# Patient Record
Sex: MALE | Race: WHITE | NOT HISPANIC OR LATINO | Employment: FULL TIME | URBAN - METROPOLITAN AREA
[De-identification: names, ages, dates, MRNs, and addresses within clinical notes are randomized per-mention and may not be internally consistent; named-entity substitution may affect disease eponyms.]

---

## 2018-01-09 ENCOUNTER — APPOINTMENT (OUTPATIENT)
Dept: RADIOLOGY | Facility: MEDICAL CENTER | Age: 33
End: 2018-01-09
Attending: EMERGENCY MEDICINE

## 2018-01-09 ENCOUNTER — APPOINTMENT (OUTPATIENT)
Dept: RADIOLOGY | Facility: MEDICAL CENTER | Age: 33
End: 2018-01-09
Attending: STUDENT IN AN ORGANIZED HEALTH CARE EDUCATION/TRAINING PROGRAM

## 2018-01-09 ENCOUNTER — HOSPITAL ENCOUNTER (OUTPATIENT)
Facility: MEDICAL CENTER | Age: 33
End: 2018-01-10
Attending: EMERGENCY MEDICINE | Admitting: INTERNAL MEDICINE

## 2018-01-09 ENCOUNTER — RESOLUTE PROFESSIONAL BILLING HOSPITAL PROF FEE (OUTPATIENT)
Dept: HOSPITALIST | Facility: MEDICAL CENTER | Age: 33
End: 2018-01-09

## 2018-01-09 DIAGNOSIS — S01.512A LACERATION OF TONGUE, INITIAL ENCOUNTER: ICD-10-CM

## 2018-01-09 DIAGNOSIS — S00.83XA CONTUSION OF FACE, INITIAL ENCOUNTER: ICD-10-CM

## 2018-01-09 DIAGNOSIS — F12.10 MARIJUANA ABUSE: ICD-10-CM

## 2018-01-09 DIAGNOSIS — R56.9 SEIZURE (HCC): ICD-10-CM

## 2018-01-09 PROBLEM — R74.01 TRANSAMINITIS: Status: ACTIVE | Noted: 2018-01-09

## 2018-01-09 PROBLEM — F12.90 MARIJUANA USE: Status: ACTIVE | Noted: 2018-01-09

## 2018-01-09 PROBLEM — E87.1 HYPONATREMIA: Status: ACTIVE | Noted: 2018-01-09

## 2018-01-09 PROBLEM — R79.89 LACTATE BLOOD INCREASE: Status: ACTIVE | Noted: 2018-01-09

## 2018-01-09 PROBLEM — R55 SYNCOPE: Status: ACTIVE | Noted: 2018-01-09

## 2018-01-09 LAB
ALBUMIN SERPL BCP-MCNC: 4 G/DL (ref 3.2–4.9)
ALBUMIN/GLOB SERPL: 1.6 G/DL
ALP SERPL-CCNC: 82 U/L (ref 30–99)
ALT SERPL-CCNC: 152 U/L (ref 2–50)
AMPHET UR QL SCN: NEGATIVE
ANION GAP SERPL CALC-SCNC: 13 MMOL/L (ref 0–11.9)
APAP SERPL-MCNC: <10 UG/ML (ref 10–30)
AST SERPL-CCNC: 189 U/L (ref 12–45)
BARBITURATES UR QL SCN: NEGATIVE
BASOPHILS # BLD AUTO: 0.2 % (ref 0–1.8)
BASOPHILS # BLD: 0.02 K/UL (ref 0–0.12)
BENZODIAZ UR QL SCN: NEGATIVE
BILIRUB CONJ SERPL-MCNC: 0.4 MG/DL (ref 0.1–0.5)
BILIRUB SERPL-MCNC: 1.7 MG/DL (ref 0.1–1.5)
BUN SERPL-MCNC: 16 MG/DL (ref 8–22)
BZE UR QL SCN: NEGATIVE
CALCIUM SERPL-MCNC: 8.4 MG/DL (ref 8.5–10.5)
CANNABINOIDS UR QL SCN: POSITIVE
CHLORIDE SERPL-SCNC: 97 MMOL/L (ref 96–112)
CHLORIDE UR-SCNC: 21 MMOL/L
CHOLEST SERPL-MCNC: 131 MG/DL (ref 100–199)
CO2 SERPL-SCNC: 21 MMOL/L (ref 20–33)
CREAT SERPL-MCNC: 0.69 MG/DL (ref 0.5–1.4)
CREAT UR-MCNC: 369.9 MG/DL
DEPRECATED D DIMER PPP IA-ACNC: 8053 NG/ML(D-DU)
EKG IMPRESSION: NORMAL
EOSINOPHIL # BLD AUTO: 0 K/UL (ref 0–0.51)
EOSINOPHIL NFR BLD: 0 % (ref 0–6.9)
ERYTHROCYTE [DISTWIDTH] IN BLOOD BY AUTOMATED COUNT: 44.1 FL (ref 35.9–50)
ERYTHROCYTE [SEDIMENTATION RATE] IN BLOOD BY WESTERGREN METHOD: >120 MM/HOUR (ref 0–15)
ETHANOL BLD-MCNC: 0 G/DL
GLOBULIN SER CALC-MCNC: 2.5 G/DL (ref 1.9–3.5)
GLUCOSE SERPL-MCNC: 150 MG/DL (ref 65–99)
HAV IGM SERPL QL IA: NEGATIVE
HBV CORE IGM SER QL: NEGATIVE
HBV SURFACE AG SER QL: NEGATIVE
HCT VFR BLD AUTO: 40.8 % (ref 42–52)
HCV AB SER QL: NEGATIVE
HDLC SERPL-MCNC: 50 MG/DL
HGB BLD-MCNC: 14.9 G/DL (ref 14–18)
IMM GRANULOCYTES # BLD AUTO: 0.05 K/UL (ref 0–0.11)
IMM GRANULOCYTES NFR BLD AUTO: 0.6 % (ref 0–0.9)
LACTATE BLD-SCNC: 1.9 MMOL/L (ref 0.5–2)
LACTATE BLD-SCNC: 3.2 MMOL/L (ref 0.5–2)
LDLC SERPL CALC-MCNC: 70 MG/DL
LV EJECT FRACT  99904: 65
LV EJECT FRACT MOD 2C 99903: 62.87
LV EJECT FRACT MOD 4C 99902: 59.39
LV EJECT FRACT MOD BP 99901: 61.9
LYMPHOCYTES # BLD AUTO: 0.31 K/UL (ref 1–4.8)
LYMPHOCYTES NFR BLD: 3.8 % (ref 22–41)
MAGNESIUM SERPL-MCNC: 1.6 MG/DL (ref 1.5–2.5)
MCH RBC QN AUTO: 33 PG (ref 27–33)
MCHC RBC AUTO-ENTMCNC: 36.5 G/DL (ref 33.7–35.3)
MCV RBC AUTO: 90.3 FL (ref 81.4–97.8)
METHADONE UR QL SCN: NEGATIVE
MONOCYTES # BLD AUTO: 0.61 K/UL (ref 0–0.85)
MONOCYTES NFR BLD AUTO: 7.5 % (ref 0–13.4)
NEUTROPHILS # BLD AUTO: 7.15 K/UL (ref 1.82–7.42)
NEUTROPHILS NFR BLD: 87.9 % (ref 44–72)
NRBC # BLD AUTO: 0 K/UL
NRBC BLD-RTO: 0 /100 WBC
OPIATES UR QL SCN: NEGATIVE
OSMOLALITY SERPL: 276 MOSM/KG H2O (ref 278–298)
OSMOLALITY UR: 902 MOSM/KG H2O (ref 300–900)
OXYCODONE UR QL SCN: NEGATIVE
PCP UR QL SCN: NEGATIVE
PLATELET # BLD AUTO: 159 K/UL (ref 164–446)
PMV BLD AUTO: 10.4 FL (ref 9–12.9)
POTASSIUM SERPL-SCNC: 3.9 MMOL/L (ref 3.6–5.5)
POTASSIUM UR-SCNC: 93.8 MMOL/L
PROPOXYPH UR QL SCN: NEGATIVE
PROT SERPL-MCNC: 6.5 G/DL (ref 6–8.2)
RBC # BLD AUTO: 4.52 M/UL (ref 4.7–6.1)
SODIUM SERPL-SCNC: 131 MMOL/L (ref 135–145)
SODIUM UR-SCNC: 25 MMOL/L
TRIGL SERPL-MCNC: 55 MG/DL (ref 0–149)
TROPONIN I SERPL-MCNC: <0.01 NG/ML (ref 0–0.04)
WBC # BLD AUTO: 8.1 K/UL (ref 4.8–10.8)

## 2018-01-09 PROCEDURE — 83935 ASSAY OF URINE OSMOLALITY: CPT

## 2018-01-09 PROCEDURE — 700117 HCHG RX CONTRAST REV CODE 255: Performed by: STUDENT IN AN ORGANIZED HEALTH CARE EDUCATION/TRAINING PROGRAM

## 2018-01-09 PROCEDURE — 85025 COMPLETE CBC W/AUTO DIFF WBC: CPT

## 2018-01-09 PROCEDURE — 80074 ACUTE HEPATITIS PANEL: CPT

## 2018-01-09 PROCEDURE — 83605 ASSAY OF LACTIC ACID: CPT | Mod: 91

## 2018-01-09 PROCEDURE — 36415 COLL VENOUS BLD VENIPUNCTURE: CPT

## 2018-01-09 PROCEDURE — 96375 TX/PRO/DX INJ NEW DRUG ADDON: CPT

## 2018-01-09 PROCEDURE — 93306 TTE W/DOPPLER COMPLETE: CPT | Mod: 26 | Performed by: INTERNAL MEDICINE

## 2018-01-09 PROCEDURE — 84133 ASSAY OF URINE POTASSIUM: CPT

## 2018-01-09 PROCEDURE — 71275 CT ANGIOGRAPHY CHEST: CPT

## 2018-01-09 PROCEDURE — A9270 NON-COVERED ITEM OR SERVICE: HCPCS | Performed by: STUDENT IN AN ORGANIZED HEALTH CARE EDUCATION/TRAINING PROGRAM

## 2018-01-09 PROCEDURE — 700111 HCHG RX REV CODE 636 W/ 250 OVERRIDE (IP): Performed by: STUDENT IN AN ORGANIZED HEALTH CARE EDUCATION/TRAINING PROGRAM

## 2018-01-09 PROCEDURE — G0378 HOSPITAL OBSERVATION PER HR: HCPCS

## 2018-01-09 PROCEDURE — 96374 THER/PROPH/DIAG INJ IV PUSH: CPT

## 2018-01-09 PROCEDURE — 83735 ASSAY OF MAGNESIUM: CPT

## 2018-01-09 PROCEDURE — 93306 TTE W/DOPPLER COMPLETE: CPT

## 2018-01-09 PROCEDURE — 70551 MRI BRAIN STEM W/O DYE: CPT

## 2018-01-09 PROCEDURE — 80307 DRUG TEST PRSMV CHEM ANLYZR: CPT

## 2018-01-09 PROCEDURE — 76705 ECHO EXAM OF ABDOMEN: CPT

## 2018-01-09 PROCEDURE — 700105 HCHG RX REV CODE 258: Performed by: STUDENT IN AN ORGANIZED HEALTH CARE EDUCATION/TRAINING PROGRAM

## 2018-01-09 PROCEDURE — 93005 ELECTROCARDIOGRAM TRACING: CPT | Performed by: EMERGENCY MEDICINE

## 2018-01-09 PROCEDURE — 70450 CT HEAD/BRAIN W/O DYE: CPT

## 2018-01-09 PROCEDURE — 99220 PR INITIAL OBSERVATION CARE,LEVL III: CPT | Performed by: INTERNAL MEDICINE

## 2018-01-09 PROCEDURE — 700105 HCHG RX REV CODE 258: Performed by: INTERNAL MEDICINE

## 2018-01-09 PROCEDURE — 80053 COMPREHEN METABOLIC PANEL: CPT

## 2018-01-09 PROCEDURE — 95819 EEG AWAKE AND ASLEEP: CPT

## 2018-01-09 PROCEDURE — 85652 RBC SED RATE AUTOMATED: CPT

## 2018-01-09 PROCEDURE — 84300 ASSAY OF URINE SODIUM: CPT

## 2018-01-09 PROCEDURE — 82570 ASSAY OF URINE CREATININE: CPT

## 2018-01-09 PROCEDURE — 82248 BILIRUBIN DIRECT: CPT

## 2018-01-09 PROCEDURE — 700111 HCHG RX REV CODE 636 W/ 250 OVERRIDE (IP): Performed by: EMERGENCY MEDICINE

## 2018-01-09 PROCEDURE — 99285 EMERGENCY DEPT VISIT HI MDM: CPT

## 2018-01-09 PROCEDURE — 85379 FIBRIN DEGRADATION QUANT: CPT

## 2018-01-09 PROCEDURE — 82436 ASSAY OF URINE CHLORIDE: CPT

## 2018-01-09 PROCEDURE — 80061 LIPID PANEL: CPT

## 2018-01-09 PROCEDURE — 700105 HCHG RX REV CODE 258: Performed by: EMERGENCY MEDICINE

## 2018-01-09 PROCEDURE — 700102 HCHG RX REV CODE 250 W/ 637 OVERRIDE(OP): Performed by: STUDENT IN AN ORGANIZED HEALTH CARE EDUCATION/TRAINING PROGRAM

## 2018-01-09 PROCEDURE — 87535 HIV-1 PROBE&REVERSE TRNSCRPJ: CPT

## 2018-01-09 PROCEDURE — 83930 ASSAY OF BLOOD OSMOLALITY: CPT

## 2018-01-09 PROCEDURE — 84484 ASSAY OF TROPONIN QUANT: CPT

## 2018-01-09 RX ORDER — SODIUM CHLORIDE 9 MG/ML
1000 INJECTION, SOLUTION INTRAVENOUS ONCE
Status: COMPLETED | OUTPATIENT
Start: 2018-01-09 | End: 2018-01-09

## 2018-01-09 RX ORDER — ONDANSETRON 4 MG/1
4 TABLET, ORALLY DISINTEGRATING ORAL EVERY 4 HOURS PRN
Status: DISCONTINUED | OUTPATIENT
Start: 2018-01-09 | End: 2018-01-10 | Stop reason: HOSPADM

## 2018-01-09 RX ORDER — LEVETIRACETAM 100 MG/ML
500 SOLUTION ORAL EVERY 12 HOURS
Status: DISCONTINUED | OUTPATIENT
Start: 2018-01-09 | End: 2018-01-09

## 2018-01-09 RX ORDER — AMOXICILLIN 250 MG
2 CAPSULE ORAL 2 TIMES DAILY
Status: DISCONTINUED | OUTPATIENT
Start: 2018-01-09 | End: 2018-01-10 | Stop reason: HOSPADM

## 2018-01-09 RX ORDER — ONDANSETRON 2 MG/ML
4 INJECTION INTRAMUSCULAR; INTRAVENOUS EVERY 4 HOURS PRN
Status: DISCONTINUED | OUTPATIENT
Start: 2018-01-09 | End: 2018-01-10 | Stop reason: HOSPADM

## 2018-01-09 RX ORDER — LEVETIRACETAM 500 MG/1
500 TABLET ORAL 2 TIMES DAILY
Status: DISCONTINUED | OUTPATIENT
Start: 2018-01-09 | End: 2018-01-10 | Stop reason: HOSPADM

## 2018-01-09 RX ORDER — BISACODYL 10 MG
10 SUPPOSITORY, RECTAL RECTAL
Status: DISCONTINUED | OUTPATIENT
Start: 2018-01-09 | End: 2018-01-10 | Stop reason: HOSPADM

## 2018-01-09 RX ORDER — THIAMINE MONONITRATE (VIT B1) 100 MG
100 TABLET ORAL DAILY
Status: DISCONTINUED | OUTPATIENT
Start: 2018-01-09 | End: 2018-01-10 | Stop reason: HOSPADM

## 2018-01-09 RX ORDER — MAGNESIUM SULFATE HEPTAHYDRATE 40 MG/ML
2 INJECTION, SOLUTION INTRAVENOUS ONCE
Status: COMPLETED | OUTPATIENT
Start: 2018-01-09 | End: 2018-01-09

## 2018-01-09 RX ORDER — POLYETHYLENE GLYCOL 3350 17 G/17G
1 POWDER, FOR SOLUTION ORAL
Status: DISCONTINUED | OUTPATIENT
Start: 2018-01-09 | End: 2018-01-10 | Stop reason: HOSPADM

## 2018-01-09 RX ORDER — FOLIC ACID 1 MG/1
1 TABLET ORAL DAILY
Status: DISCONTINUED | OUTPATIENT
Start: 2018-01-09 | End: 2018-01-10 | Stop reason: HOSPADM

## 2018-01-09 RX ORDER — METOCLOPRAMIDE HYDROCHLORIDE 5 MG/ML
10 INJECTION INTRAMUSCULAR; INTRAVENOUS ONCE
Status: COMPLETED | OUTPATIENT
Start: 2018-01-09 | End: 2018-01-09

## 2018-01-09 RX ORDER — SODIUM CHLORIDE 9 MG/ML
INJECTION, SOLUTION INTRAVENOUS CONTINUOUS
Status: DISCONTINUED | OUTPATIENT
Start: 2018-01-09 | End: 2018-01-10 | Stop reason: HOSPADM

## 2018-01-09 RX ADMIN — METOCLOPRAMIDE 10 MG: 5 INJECTION, SOLUTION INTRAMUSCULAR; INTRAVENOUS at 05:13

## 2018-01-09 RX ADMIN — ONDANSETRON 4 MG: 4 TABLET, ORALLY DISINTEGRATING ORAL at 23:45

## 2018-01-09 RX ADMIN — MAGNESIUM SULFATE IN WATER 2 G: 40 INJECTION, SOLUTION INTRAVENOUS at 11:57

## 2018-01-09 RX ADMIN — ONDANSETRON 4 MG: 4 TABLET, ORALLY DISINTEGRATING ORAL at 17:22

## 2018-01-09 RX ADMIN — Medication 100 MG: at 09:41

## 2018-01-09 RX ADMIN — SODIUM CHLORIDE: 9 INJECTION, SOLUTION INTRAVENOUS at 10:00

## 2018-01-09 RX ADMIN — IOHEXOL 100 ML: 350 INJECTION, SOLUTION INTRAVENOUS at 09:48

## 2018-01-09 RX ADMIN — LEVETIRACETAM 500 MG: 500 TABLET, FILM COATED ORAL at 19:33

## 2018-01-09 RX ADMIN — SODIUM CHLORIDE: 9 INJECTION, SOLUTION INTRAVENOUS at 23:45

## 2018-01-09 RX ADMIN — FOLIC ACID 1 MG: 1 TABLET ORAL at 09:41

## 2018-01-09 RX ADMIN — ENOXAPARIN SODIUM 40 MG: 100 INJECTION SUBCUTANEOUS at 09:42

## 2018-01-09 RX ADMIN — ONDANSETRON 4 MG: 2 INJECTION INTRAMUSCULAR; INTRAVENOUS at 07:56

## 2018-01-09 RX ADMIN — THERA TABS 1 TABLET: TAB at 09:40

## 2018-01-09 RX ADMIN — SODIUM CHLORIDE 1000 ML: 9 INJECTION, SOLUTION INTRAVENOUS at 05:16

## 2018-01-09 RX ADMIN — SODIUM CHLORIDE 1000 ML: 9 INJECTION, SOLUTION INTRAVENOUS at 04:15

## 2018-01-09 ASSESSMENT — ENCOUNTER SYMPTOMS
DIARRHEA: 0
ABDOMINAL PAIN: 1
WEAKNESS: 1
SENSORY CHANGE: 0
BLURRED VISION: 0
FOCAL WEAKNESS: 0
DIAPHORESIS: 1
NAUSEA: 0
SHORTNESS OF BREATH: 0
DOUBLE VISION: 0
SORE THROAT: 0
CONSTIPATION: 0
TREMORS: 1
VOMITING: 0
DEPRESSION: 0
PALPITATIONS: 0
NAUSEA: 1
VOMITING: 1
DIARRHEA: 1
CHILLS: 1
WEAKNESS: 0
ABDOMINAL PAIN: 0
PHOTOPHOBIA: 0
DIZZINESS: 0
FEVER: 0
CHILLS: 0
COUGH: 0
BLOOD IN STOOL: 0
MYALGIAS: 0
FEVER: 1
LOSS OF CONSCIOUSNESS: 1
HEADACHES: 0
TINGLING: 0
WHEEZING: 0
BACK PAIN: 0

## 2018-01-09 ASSESSMENT — LIFESTYLE VARIABLES
EVER FELT BAD OR GUILTY ABOUT YOUR DRINKING: NO
TOTAL SCORE: 0
HAVE YOU EVER FELT YOU SHOULD CUT DOWN ON YOUR DRINKING: NO
SUBSTANCE_ABUSE: 1
ALCOHOL_USE: YES
ON A TYPICAL DAY WHEN YOU DRINK ALCOHOL HOW MANY DRINKS DO YOU HAVE: 2
EVER_SMOKED: NEVER
TOTAL SCORE: 0
TOTAL SCORE: 0
HAVE PEOPLE ANNOYED YOU BY CRITICIZING YOUR DRINKING: NO
EVER HAD A DRINK FIRST THING IN THE MORNING TO STEADY YOUR NERVES TO GET RID OF A HANGOVER: NO
AVERAGE NUMBER OF DAYS PER WEEK YOU HAVE A DRINK CONTAINING ALCOHOL: 2
HOW MANY TIMES IN THE PAST YEAR HAVE YOU HAD 5 OR MORE DRINKS IN A DAY: 0
CONSUMPTION TOTAL: NEGATIVE

## 2018-01-09 ASSESSMENT — PAIN SCALES - GENERAL
PAINLEVEL_OUTOF10: 0
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
## 2018-01-09 NOTE — EEG PROGRESS NOTE
ROUTINE ELECTROENCEPHALOGRAM REPORT      Referring MD: Dr. Novak.     DOS:  1/9/2018 (total recording for 30 minutes).     INDICATION:  Cory Marmolejo 32 y.o. male presenting with syncope.     CURRENT ANTIEPILEPTIC REGIMEN: none.     TECHNIQUE: 30 channel routine electroencephalogram (EEG) was performed in accordance with the international 10-20 system. The study was reviewed in bipolar and referential montages. The recording examined the patient during wakeful and drowsy/sleep state(s).     DESCRIPTION OF THE RECORD:  During the wakefulness, the background showed a symmetrical 10 Hz alpha activity posteriorly with amplitude of 70 mV.  There was reactivity to eye closure/opening.  A normal anterior-posterior gradient was noted with faster beta frequencies seen anteriorly.  During drowsiness, theta/delta frequencies were seen.    During the sleep state, background shows diffuse high-amplitude 4-5 Hz delta activity.  Symmetrical high-amplitude sleep spindles and vertex sharps were seen in the leads over the central regions.     ACTIVATION PROCEDURES:   Hyperventilation was performed by the patient for a total of 3 minutes. The technician performing the test noted good effort.  No clear changes in the background, but frequent epileptiform discharges were seen.       N        Intermittent Photic stimulation was performed in a stepwise fashion from 1 to 30 Hz and elicited a normal response (photic driving), most noticeable in the posterior leads.      ICTAL AND/OR INTERICTAL FINDINGS:   Frequent polyspike and slow wave were noted during the study. These had a generalized appearance but most prominently noted in the left frontal and central regions. No clinical events or seizures were reported or recorded during the study.         EKG: sampling of the EKG recording demonstrated sinus rhythm.       INTERPRETATION:  This is an abnormal routine EEG recording in the awake, drowsy, and sleep state(s).  Frequent polyspike and slow  wave complexes were noted during the study. These had a generalized appearance but most prominently noted in the left frontal and central regions. The patient is at increased risk for seizures. The findings suggest underlying area(s) of cortical irritability and or structural abnormality. No clinical events or seizures were reported or recorded during the study. Clinical and radiological correlation is recommended.    Note: updates provided to patient's RN.         Sandro Vo MD  Medical Director, Epilepsy and Neurodiagnostics.   Clinical  of Neurology [facility].   Diplomate in Neurology, Epilepsy, and Electrodiagnostic Medicine.   Office: [phone]  Fax: [phone]

## 2018-01-09 NOTE — ED NOTES
Pt with nausea/small amount of emesis, medicated with Zofran according to MAR.  Pt to T208 with CDU RN on [facility].

## 2018-01-09 NOTE — ASSESSMENT & PLAN NOTE
- Patient presented with episode of syncope, no prodromal symptoms, +ve post-ictal state and tongue biting  - Patient has history of multiple TBIs, previous craniectomy  - EKG shows NSR, no acute abnormalities  - UDS positive for THC  - Echo shows a normal EF, no structural abnormalities  - EEG shows frequent polyspike and slow wave complexes, increased risk for seizure  - MRI brain negative for any acute process  - Likely caused by seizure disorder due to history of multiple TBIs   - Continue Keppra, requires follow up with Neurologist as outpatient

## 2018-01-09 NOTE — SENIOR ADMIT NOTE
"Mr. Marmolejo is a 32 y.o. male with no significant PMHx who was brought in to the ER after a syncopal episode that occurred while he was at work.  He had no prodrome aside from a slight feeling of warmth just before he passed out and the next thing he remembers he awoke on the floor surrounded by a nurse.  He had no incontinence and was not told that he had any jerking body movements, though he did bite his tongue during the episode.  He reports a little confusion when he awoke but this cleared up pretty quickly.  He has only had one similar episode which occurred 2 years ago and he states his blood sugar was low at the time.  Today, patient states he hadnt eaten much all day and was working a night shift and just as he was about to sit down for \"lunch\" around midnight, the episode occurred.  He denies taking any meds and does not smoke or drink or use illicit drugs, though he does smoke pot daily.  When asked about his liver function, he reports he has been told once before when he was evaluated for depression a year or so ago that he had hepatitis, but then was told that it had gone away.  He denies feeling unwell during the past few days.  At this time, he feels a little shaky and is nauseous, and threw up while trying to drink water in the ER.  He denies chest pain, dyspnea, abdominal pain, palpitations, headache, diarrhea, constipation.  He reports good fluid intake on a regular basis.  He has no family h/o arrythmias, cardiac issues, diabetes, seizure, etc.    Exam:  /99   Pulse 83   Temp 36.6 °C (97.8 °F)   Resp (!) 35   Ht 1.778 m (5' 10\")   Wt 61.2 kg (135 lb)   SpO2 97%   BMI 19.37 kg/m²     - no acute distress  - appears slightly tremulous  - alert and oriented; no focal deficits  - + abrasion over R forehead  - PERRL, EOMI, neck supple, no LAD, dry oral mucosa, no jaundice  - CTABL, no rales/ronchi  - RRR, no m/r/g  - abd soft, nontender, no HSmegaly, BS+    Labs:  Recent Results (from the past " 24 hour(s))   CBC WITH DIFFERENTIAL    Collection Time: 01/09/18  2:00 AM   Result Value Ref Range    WBC 8.1 4.8 - 10.8 K/uL    RBC 4.52 (L) 4.70 - 6.10 M/uL    Hemoglobin 14.9 14.0 - 18.0 g/dL    Hematocrit 40.8 (L) 42.0 - 52.0 %    MCV 90.3 81.4 - 97.8 fL    MCH 33.0 27.0 - 33.0 pg    MCHC 36.5 (H) 33.7 - 35.3 g/dL    RDW 44.1 35.9 - 50.0 fL    Platelet Count 159 (L) 164 - 446 K/uL    MPV 10.4 9.0 - 12.9 fL    Neutrophils-Polys 87.90 (H) 44.00 - 72.00 %    Lymphocytes 3.80 (L) 22.00 - 41.00 %    Monocytes 7.50 0.00 - 13.40 %    Eosinophils 0.00 0.00 - 6.90 %    Basophils 0.20 0.00 - 1.80 %    Immature Granulocytes 0.60 0.00 - 0.90 %    Nucleated RBC 0.00 /100 WBC    Neutrophils (Absolute) 7.15 1.82 - 7.42 K/uL    Lymphs (Absolute) 0.31 (L) 1.00 - 4.80 K/uL    Monos (Absolute) 0.61 0.00 - 0.85 K/uL    Eos (Absolute) 0.00 0.00 - 0.51 K/uL    Baso (Absolute) 0.02 0.00 - 0.12 K/uL    Immature Granulocytes (abs) 0.05 0.00 - 0.11 K/uL    NRBC (Absolute) 0.00 K/uL   COMP METABOLIC PANEL    Collection Time: 01/09/18  2:00 AM   Result Value Ref Range    Sodium 131 (L) 135 - 145 mmol/L    Potassium 3.9 3.6 - 5.5 mmol/L    Chloride 97 96 - 112 mmol/L    Co2 21 20 - 33 mmol/L    Anion Gap 13.0 (H) 0.0 - 11.9    Glucose 150 (H) 65 - 99 mg/dL    Bun 16 8 - 22 mg/dL    Creatinine 0.69 0.50 - 1.40 mg/dL    Calcium 8.4 (L) 8.5 - 10.5 mg/dL    AST(SGOT) 189 (H) 12 - 45 U/L    ALT(SGPT) 152 (H) 2 - 50 U/L    Alkaline Phosphatase 82 30 - 99 U/L    Total Bilirubin 1.7 (H) 0.1 - 1.5 mg/dL    Albumin 4.0 3.2 - 4.9 g/dL    Total Protein 6.5 6.0 - 8.2 g/dL    Globulin 2.5 1.9 - 3.5 g/dL    A-G Ratio 1.6 g/dL   LACTIC ACID    Collection Time: 01/09/18  2:00 AM   Result Value Ref Range    Lactic Acid 3.2 (H) 0.5 - 2.0 mmol/L   DIAGNOSTIC ALCOHOL    Collection Time: 01/09/18  2:00 AM   Result Value Ref Range    Diagnostic Alcohol 0.00 0.00 g/dL   ESTIMATED GFR    Collection Time: 01/09/18  2:00 AM   Result Value Ref Range    GFR If   American >60 >60 mL/min/1.73 m 2    GFR If Non African American >60 >60 mL/min/1.73 m 2   URINE DRUG SCREEN    Collection Time: 18  2:50 AM   Result Value Ref Range    Amphetamines Urine Negative Negative    Barbiturates Negative Negative    Benzodiazepines Negative Negative    Cocaine Metabolite Negative Negative    Methadone Negative Negative    Opiates Negative Negative    Oxycodone Negative Negative    Phencyclidine -Pcp Negative Negative    Propoxyphene Negative Negative    Cannabinoid Metab Positive (A) Negative   EKG (NOW)    Collection Time: 18  3:20 AM   Result Value Ref Range    Report       [facility] Emergency Dept.    Test Date:  2018  Pt Name:    KIANA COWAN                   Department: ER  MRN:        [id number]                      Room:        13  Gender:     Male                         Technician: [id number]  :        1985                   Requested By:PAVAN GIBSON  Order #:    [id number]                    Reading MD:    Measurements  Intervals                                Axis  Rate:       77                           P:          62  ND:         124                          QRS:        68  QRSD:       90                           T:          64  QT:         396  QTc:        449    Interpretive Statements  SINUS RHYTHM  No previous ECG available for comparison         CT-HEAD W/O   Final Result         1. No acute intracranial abnormality. No evidence of acute intracranial hemorrhage or mass lesion.                   Assessment and Plan:    SYNCOPE  LACTIC ACIDOSIS  TRANSAMINITIS  ELEVATED BILIRUBIN  - syncopal event at work; ?2/2 hypoglycemia vs seizure vs arrythmia  - CT head wnl; lactate elevated suggesting seizure; EKG wnl; glucose wnl; utox + for thc, BAL neg  - admit to tele obs for cardiac montioring  - echo in AM  - orthostatics negative; continue IVF hydration  - Q6H accuchecks  - check hep panel for transaminitis; tbili elevated 2/2  stress?    For further details of Assessment & Plan, please refer to H&P by Dr. Shin from 1/9/2018.    CODE STATUS: FULL  DVT PROPHYLAXIS: herminio Vincent MD

## 2018-01-09 NOTE — ED PROVIDER NOTES
"ED Provider Note    Scribed for Valentino Taylor M.D. by Jong Villalobos. 1/9/2018, 1:56 AM.    Primary care provider: Pcp Unknown  Means of arrival: Simone  History obtained from: Patient  History limited by: None    CHIEF COMPLAINT  Chief Complaint   Patient presents with   • Syncope     HPI  Cory Marmolejo is a 32 y.o. male who presents to the Emergency Department via Fire complaining of a syncopal episode at 6:00 PM. The patient was at [facility] when he was doing an sitting for an orientation. He did not feel any symptoms before or after but simply lost consciousness. He has a bump to his right forehead but denies any pain including to his neck or back. Per nurse, EMS found the patient with a HR of 155, BP of 170/122, and a BS of 167. Denies headache, urinary incontinence. Denies history of seizure. Denies any drug or alcohol use other than marijuana. Patient previously had a craniotomy in the 90s but has not had any complications since then.    REVIEW OF SYSTEMS  See HPI for further details. All other systems are negative.    C.    PAST MEDICAL HISTORY       SURGICAL HISTORY   has a past surgical history that includes craniotomy.    SOCIAL HISTORY  Social History   Substance Use Topics   • Smoking status: Never Smoker   • Smokeless tobacco: Never Used   • Alcohol use Yes      Comment: social      History   Drug Use   • Types: Marijuana     Comment: THC     FAMILY HISTORY  None noted    CURRENT MEDICATIONS  Reviewed.  See Encounter Summary.     ALLERGIES  Allergies   Allergen Reactions   • Tylenol Nausea     PHYSICAL EXAM  VITAL SIGNS: /99   Pulse 82   Temp 36.6 °C (97.8 °F)   Resp 14   Ht 1.778 m (5' 10\")   Wt 61.2 kg (135 lb)   SpO2 97%   BMI 19.37 kg/m²   Constitutional: Alert in no apparent distress.  HENT: Hematoma to right forehead, Bilateral external ears normal, Nose normal, Bite moreno left lateral tongue.  Eyes: Pupils are equal and reactive, Conjunctiva normal, Non-icteric.   Neck: " Normal range of motion, No tenderness, Supple, No stridor.   Lymphatic: No lymphadenopathy noted.   Cardiovascular: Regular rate and rhythm, no murmurs.   Thorax & Lungs: Normal breath sounds, No respiratory distress, No wheezing, No chest tenderness.   Abdomen: Bowel sounds normal, Soft, No tenderness, No masses, No pulsatile masses. No peritoneal signs.  Skin: Warm, Dry, No erythema, No rash.   Back: No bony tenderness, No CVA tenderness.   Extremities: Intact distal pulses, No edema, No tenderness, No cyanosis  Musculoskeletal: Good range of motion in all major joints. No tenderness to palpation or major deformities noted.   Neurologic: Alert , Normal motor function, Normal sensory function, No focal deficits noted. Finger to nose normal, heel to shin normal, cerebellar intact, cranial nerves II-XII intact, strength 5/5 and equal bilaterally, sensation intact throughout, cooperative, appropriate   Psychiatric: Affect normal, Judgment normal, Mood normal.     DIAGNOSTIC STUDIES / PROCEDURES     LABS  Results for orders placed or performed during the hospital encounter of 01/09/18   CBC WITH DIFFERENTIAL   Result Value Ref Range    WBC 8.1 4.8 - 10.8 K/uL    RBC 4.52 (L) 4.70 - 6.10 M/uL    Hemoglobin 14.9 14.0 - 18.0 g/dL    Hematocrit 40.8 (L) 42.0 - 52.0 %    MCV 90.3 81.4 - 97.8 fL    MCH 33.0 27.0 - 33.0 pg    MCHC 36.5 (H) 33.7 - 35.3 g/dL    RDW 44.1 35.9 - 50.0 fL    Platelet Count 159 (L) 164 - 446 K/uL    MPV 10.4 9.0 - 12.9 fL    Neutrophils-Polys 87.90 (H) 44.00 - 72.00 %    Lymphocytes 3.80 (L) 22.00 - 41.00 %    Monocytes 7.50 0.00 - 13.40 %    Eosinophils 0.00 0.00 - 6.90 %    Basophils 0.20 0.00 - 1.80 %    Immature Granulocytes 0.60 0.00 - 0.90 %    Nucleated RBC 0.00 /100 WBC    Neutrophils (Absolute) 7.15 1.82 - 7.42 K/uL    Lymphs (Absolute) 0.31 (L) 1.00 - 4.80 K/uL    Monos (Absolute) 0.61 0.00 - 0.85 K/uL    Eos (Absolute) 0.00 0.00 - 0.51 K/uL    Baso (Absolute) 0.02 0.00 - 0.12 K/uL     Immature Granulocytes (abs) 0.05 0.00 - 0.11 K/uL    NRBC (Absolute) 0.00 K/uL   COMP METABOLIC PANEL   Result Value Ref Range    Sodium 131 (L) 135 - 145 mmol/L    Potassium 3.9 3.6 - 5.5 mmol/L    Chloride 97 96 - 112 mmol/L    Co2 21 20 - 33 mmol/L    Anion Gap 13.0 (H) 0.0 - 11.9    Glucose 150 (H) 65 - 99 mg/dL    Bun 16 8 - 22 mg/dL    Creatinine 0.69 0.50 - 1.40 mg/dL    Calcium 8.4 (L) 8.5 - 10.5 mg/dL    AST(SGOT) 189 (H) 12 - 45 U/L    ALT(SGPT) 152 (H) 2 - 50 U/L    Alkaline Phosphatase 82 30 - 99 U/L    Total Bilirubin 1.7 (H) 0.1 - 1.5 mg/dL    Albumin 4.0 3.2 - 4.9 g/dL    Total Protein 6.5 6.0 - 8.2 g/dL    Globulin 2.5 1.9 - 3.5 g/dL    A-G Ratio 1.6 g/dL   LACTIC ACID   Result Value Ref Range    Lactic Acid 3.2 (H) 0.5 - 2.0 mmol/L   URINE DRUG SCREEN   Result Value Ref Range    Amphetamines Urine Negative Negative    Barbiturates Negative Negative    Benzodiazepines Negative Negative    Cocaine Metabolite Negative Negative    Methadone Negative Negative    Opiates Negative Negative    Oxycodone Negative Negative    Phencyclidine -Pcp Negative Negative    Propoxyphene Negative Negative    Cannabinoid Metab Positive (A) Negative   DIAGNOSTIC ALCOHOL   Result Value Ref Range    Diagnostic Alcohol 0.00 0.00 g/dL   ESTIMATED GFR   Result Value Ref Range    GFR If African American >60 >60 mL/min/1.73 m 2    GFR If Non African American >60 >60 mL/min/1.73 m 2   EKG (NOW)   Result Value Ref Range    Report       [facility] Emergency Dept.    Test Date:  2018  Pt Name:    KIANA COWAN                   Department: ER  MRN:        [id number]                      Room:       BL 13  Gender:     Male                         Technician: [id number]  :        1985                   Requested By:PAVAN GIBSON  Order #:    [id number]                    Reading MD:    Measurements  Intervals                                Axis  Rate:       77                           P:          62  MO:          124                          QRS:        68  QRSD:       90                           T:          64  QT:         396  QTc:        449    Interpretive Statements  SINUS RHYTHM  No previous ECG available for comparison       All labs were reviewed by me.    EKG  0320  12 Lead EKG interpreted by me to show:  Sinus rhythm  Rate 77  Axis: Normal  Intervals: Normal  No conduction abnormalities  No acute ST-T wave changes    RADIOLOGY  CT-HEAD W/O   Final Result         1. No acute intracranial abnormality. No evidence of acute intracranial hemorrhage or mass lesion.                 The radiologist's interpretation of all radiological studies and images have been reviewed by me.    COURSE & MEDICAL DECISION MAKING  Pertinent Labs & Imaging studies reviewed. (See chart for details)    Differential diagnoses include but are not limited to: Cardiac dysrhythmia, Seizure, Vasovagal syncope     1:56 AM - Patient seen and examined at bedside. Ordered CT-Head, Urine Drug Screen, Diagnostic Alcohol, CBC, CMP, Lactic Acid to evaluate his symptoms.     Decision Making:  This is a 32 y.o. year old male who presents with Above complaint. Concern about possible first-time seizure given his presentation today including syncope with no prodrome, lingual laceration and elevated lactic acid. Patient adamantly denies possibility of withdrawal other alcohol or other illicit substances. He does freely admit to tobacco and marijuana. The patient remains intermittently tachycardic as well as intermittent nausea. CT head negative at this time. No recurrent syncope or seizure-like activity under observation here. I discussed this with the services of ongoing a patient care at this time.    The total critical care time on this patient is 40 minutes, resuscitating patient, speaking with admitting physician, and deciphering test results. This 40 minutes is exclusive of separately billable procedures.    DISPOSITION:  Patient will be admitted to  [facility], in guarded condition.    FINAL IMPRESSION  1. Seizure (CMS-HCC)    2. Marijuana abuse    3. Contusion of face, initial encounter    4. Laceration of tongue, initial encounter          IJong (Scribe), am scribing for, and in the presence of, Valentino Taylor M.D..    Electronically signed by: Jong Villalobos (Scribe), 1/9/2018    IValentino M.D. personally performed the services described in this documentation, as scribed by Jong Villalobos in my presence, and it is both accurate and complete.    The note accurately reflects work and decisions made by me.  Valentino Taylor  1/9/2018  4:18 AM

## 2018-01-09 NOTE — NON-PROVIDER
Internal Medicine Medical Student Note  Note Author: Deysi Franklin, Student    Name Cory Marmolejo       1985   Age/Sex 32 y.o. male   MRN [id number]   Code Status Full     After 5PM or if no immediate response to page, please call for cross-coverage  Attending/Team: Dr. Samayoa/ MINNIE See Patient List for primary contact information  Call [phone] to page after hours   1st Call - Day Intern (R1):   Dr. Schroeder 2nd Call - Day Sr. Resident (R2/R3):   Dr. Perea     Reason for interval visit  (Principal Problem)   Syncope vs. seizure    Interval Problem Daily Status Update  (24 hours)   Mr. Marmolejo is a 33yo M who presented to the ED w/ LOC from suspected syncope vs. Seizure yesterday at 6pm. Pt bit his tongue during the incident & only regained memory once in the hospital, where he was initially confused. 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 He had been vomiting 5x/day for 2 days before this and having diarrhea that was mostly brown but did have some black in it. Pt also reports abd pain & feeling feverish before this event. Pt's last drink of alcohol was 18hrs before the episode of LOC. He is still feeling nauseous & vomited while I was in the room performing the interview.     Review of Systems   Constitutional: Positive for chills, diaphoresis and fever.   Eyes: Negative for blurred vision and double vision.   Respiratory: Negative for cough and shortness of breath.    Cardiovascular: Negative for chest pain and palpitations.   Gastrointestinal: Positive for abdominal pain, diarrhea, melena, nausea and vomiting. Negative for blood in stool and constipation.   Neurological: Positive for tremors and loss of consciousness. Negative for tingling, focal weakness, weakness and headaches.   Psychiatric/Behavioral: Negative for depression.     Physical Exam     Vitals:    18 0300 18 0330 18 0405 18 0500   BP:       Pulse: 81 83 97 83   Resp: (!) 21 (!) 35 16 12    Temp:       SpO2: 96% 97% 98% 99%   Weight:       Height:         Body mass index is 19.37 kg/m². Weight: 61.2 kg (135 lb)  Oxygen Therapy:  Pulse Oximetry: 99 %    Physical Exam   Constitutional: He is oriented to person, place, and time and well-developed, well-nourished, and in no distress. No distress.   HENT:   Head: Normocephalic and atraumatic.   Right Ear: External ear normal.   Left Ear: External ear normal.   Nose: Nose normal.   Eyes: Conjunctivae and EOM are normal. Pupils are equal, round, and reactive to light.   Neck: Normal range of motion. Neck supple. No JVD present. No tracheal deviation present.   Cardiovascular: Normal rate, regular rhythm and normal heart sounds.  Exam reveals no gallop and no friction rub.    No murmur heard.  Pulmonary/Chest: Effort normal and breath sounds normal. No respiratory distress. He has no wheezes. He has no rales.   Abdominal: Soft. Bowel sounds are normal. He exhibits no distension and no mass. There is tenderness in the right lower quadrant and left lower quadrant. There is no rebound and no guarding.   Musculoskeletal: Normal range of motion. He exhibits no edema, tenderness or deformity.   Neurological: He is alert and oriented to person, place, and time. No cranial nerve deficit. Coordination normal. GCS score is 15.   Skin: Skin is warm and dry. No rash noted. He is not diaphoretic. No erythema.     Assessment/Plan   Mr. Marmolejo is a 31yo M w/o significant PMH who presented to ED after LOC at a job interview at [facility] after n/v x2days. Suspect seizure vs. Syncope.    # LOC  - Seizure more likely than syncope due to tongue biting, post-ictal confusion, and long duration of LOC  - Seizure likely 2/2 previous TBI's vs. Hyponatremia vs. Alcohol intoxication vs. Alcohol withdrawal   - If syncope, likely 2/2 dehydration vs. Vasovagal vs. cardiac abnormalities vs. hypoglycemia  - CT head was wnl  - Negative for orthostatics  - EKG in NSR, troponin negative  - Last  drink was 18hrs before episode of LOC yesterday   Plan:   - Order ECHO to r/o cardiac causes of syncope   - Order MRI brain to r/o neurologic causes of syncope/seizure   - Order EEG to qualify seizure and monitor for seizure activity   - Consult neurology   - Consider starting pt on an anti-epileptic such as topiramate or lamictal, refer to neurology    # N/V w/ abd pain  - Pt reports n/v, abd pain, and diarrhea, but states that he does vomit after drinking and when withdrawing from alcohol. Also consider infectious cause though WBC is wnl.   Plan:   - Continue IVF's to prevent dehydration   - US gallbladder pending    # Lactate elevated  - Likely 2/2 seizure vs. Hypoxia vs. Dehydration  - Level was 3.2, now down to 1.9   Plan:   - Continue to trend lactate & give IVF's    # LFT & bili elevated  - XPM=360, ALT=152, bili=1.7, direct bili=0.4, alk phos=82  - Likely 2/2 alcoholic hepatitis, which may also be causing elevated D-dimer  - Hepatic steatosis seen on CT  - Pt states that he may have had hepatitis in the past, but hepatitis panel is neg  - D-azdze=1760 likely 2/2 liver disease. CT PE neg for PE.   Plan:   - US liver to evaluate for cirrhosis    # Hyponatremia   - Chronic per chart review, also present in 2005  - Qy=126, corrected Na for glucose=132, serum mpjaxscwkj=550 (low); urine Na=25, urine bmbxuelkid=383 (high)  - According to UpToDate's algorithm, Na intake should be >150mEq over next 24hrs, then recheck the above labs. If levels remain in the same range, consider hypovolemic hyponatremia 2/2 prolonged emesis.   Plan:   - Continue to monitor CMP & give IVF's    # ESR elevated  - ESR > 120  - May indicate infectious process vs. Autoimmune disorder vs. Malignancy  - Pt remains afebrile but had some elevated bp & hr on admission, indicating a possible infection that may have been causing his n/v   Plan:   - Continue to monitor    # Alcohol use  - Pt reports drinking about 5 shots of vodka 3-4x/wk and  reports symptoms of alcohol withdrawal when he doesn't drink for a day or 2  - Pt is currently tremulous  - PRISCILA=0 w/ urine tox negative except for cannabis, which he admits to smoking every day  - Alcohol use could be contributing to elevated LFT's  - Last drink was 18hrs before episode of LOC yesterday   Plan:   - Continue to monitor for alcohol w/drawal symptoms & start Vinayak protocol if symptoms develop   - Continue folate, thiamine, and multi-vitamin for possible vitamin def    Dispo: admit to medicine floor for monitoring of further LOC episodes and continued w/u

## 2018-01-09 NOTE — ED NOTES
The Medication Reconciliation process has been completed by interviewing the patient    Allergies have been reviewed  Antibiotic use in 30 days - none    Home Pharmacy:  [facility] - [place]

## 2018-01-09 NOTE — DISCHARGE PLANNING
Care Transition Team Assessment    Information Source  Information Given By: Patient  Informant's Name:  (Cory Marmolejo)  Who is responsible for making decisions for patient? : Patient    Readmission Evaluation  Is this a readmission?: No    Elopement Risk  Legal Hold: No  Ambulatory or Self Mobile in Wheelchair: No-Not an Elopement Risk    Interdisciplinary Discharge Planning  Does Admitting Nurse Feel This Could be a Complex Discharge?: No  Lives with - Patient's Self Care Capacity: Significant Other  Patient or legal guardian wants to designate a caregiver (see row info): No  Support Systems: Friends / Neighbors  Housing / Facility: 1 Story Apartment / Condo  Do You Take your Prescribed Medications Regularly: No  Able to Return to Previous ADL's: Yes  Mobility Issues: No  Patient Expects to be Discharged to::  (Home)  Assistance Needed: No    Discharge Preparedness  What is your plan after discharge?: Home with help  What are your discharge supports?: Partner  Prior Functional Level: Drives Self  Difficulity with ADLs: None  Difficulity with IADLs: None    Functional Assesment  Prior Functional Level: Drives Self    Finances  Financial Barriers to Discharge: Yes (No insurance)  Source of Income: Employed (Just started at [email or web address], day one)  Prescription Coverage: No    Vision / Hearing Impairment  Vision Impairment : No  Hearing Impairment : No    Values / Beliefs / Concerns  Values / Beliefs Concerns : No    Advance Directive  Advance Directive?: None    Domestic Abuse  Have you ever been the victim of abuse or violence?: No  Physical Abuse or Sexual Abuse: No  Verbal Abuse or Emotional Abuse: No    Psychological Assessment  History of Substance Abuse: Marijuana  Date Last Used - Marijuana:  (Christiano days ago)    Discharge Risks or Barriers  Discharge risks or barriers?: Uninsured / underinsured    Anticipated Discharge Information  Anticipated discharge disposition: Home  Discharge Address:  ([address]   [place])  Discharge Contact Phone Number:  ([phone])

## 2018-01-09 NOTE — ED NOTES
PT BIB [facility] for Syncope at a [employer] job interview. Pt denies pains or trauma. Neuro intact. Pt was found with hr 155, /122,  A&Ox4. VSS ERP at bedside

## 2018-01-09 NOTE — DISCHARGE SUMMARY
Internal Medicine Discharge Summary  Note Author: Chay Noavk M.D.       Admit Date:  1/9/2018       Discharge Date:  1/10/2018     Service:   R Internal Medicine Red Team  Attending Physician(s):   Dr. Samayoa       Senior Resident(s):   Dr. Perea  Alexis Resident(s):   Dr. Novak      Primary Diagnosis:   Syncope likely 2/2 to seizure disorder    Secondary Diagnoses:                Principal Problem:    Seizure disorder (CMS-HCC) POA: Yes  Active Problems:    Transaminitis POA: Yes    Hyponatremia POA: Yes    Marijuana use POA: Yes    Hypokalemia POA: Yes  Resolved Problems:    Lactate blood increase POA: Yes      Hospital Summary (Brief Narrative):       Mr. 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 Patient reports no prodromal symptoms prior to episode, no tonic-clonic movements but noted to have positive bite marks on tongue and post-ictal state. In the ED, EKG shows NSR. CT head was negative for an acute process. UDS was positive for THC. Labs showed elevated LFTs and bilirubin.    Patient was admitted to observation and underwent an ECHO, MRI brain which were unremarkable. Abdominal ultrasound showed no evidence of cholelithiasis but showed hepatic steatosis. EEG was performed and showed frequent polyspike and slow wave complexes which place the patient at an increased risk for seizure. Neurology was consulted and the patient was started on Keppra. Repeat labs shows improved LFTs with a resolution of his elevated bilirubin. Patient was noted to have hypokalemia to 2.8 with no EKG changes. Patient was given IV and PO replacement and noted to have a potassium of 3.4 on discharge.     Patient was discharged in stable condition with Keppra and a 7 day course of potassium replacement with a repeat BMP scheduled for 1/17/2018. Patient was educated about importance of continuing Keppa and close follow up with PCP and Neurologist as an  outpatient. Also recommended not to drive for a minimum of 3 months. Patient also educated to discontinue use of alcohol and THC as due to his hepatic steatosis and likely seizure disorder.       Patient /Hospital Summary (Details -- Problem Oriented) :          * Seizure disorder (CMS-HCC)   Assessment & Plan    - Patient presented with episode of syncope, no prodromal symptoms, +ve post-ictal state and tongue biting  - Patient has history of multiple TBIs, previous craniectomy  - EKG shows NSR, no acute abnormalities  - UDS positive for THC  - Echo shows a normal EF, no structural abnormalities  - EEG shows frequent polyspike and slow wave complexes, increased risk for seizure  - MRI brain negative for any acute process  - Likely caused by seizure disorder due to history of multiple TBIs   - Continue Keppra, requires follow up with Neurologist as outpatient        Transaminitis   Assessment & Plan    - Transaminitis with elevated bilirubin   - Patient has a history of alcohol use, 4-5 shots a day x 4 days a week  - CTPE shows hepatic steatosis   - HIV, hepatitis panel negative  - RUQ ultrasound shows enlarged, heterogeneous and echogenic appearance consistent with hepatic steatosis, no gallstones  - Continue folate/multivitamin/B12  - Educated to discontinue ETOH use        Hypokalemia   Assessment & Plan    - Patient noted to have hypokalemia to 2.8  - EKG showed no T wave abnormalities, no QRS prolongation noted  - Replaced with oral and IV supplementation  - Will discharge on short-term K replacement, requires repeat BMP        Hyponatremia   Assessment & Plan    - chronic hyponatermia   - per chart review was hyponatremic in 2005        Lactate blood increase-resolved as of 1/10/2018   Assessment & Plan    - Likely due to liver pathology, infection unlikely  - Resolved          Consultants:     Neurology    Procedures:        EEG on 1/9/2018    Imaging/ Testing:      MR-BRAIN-W/O   Final Result      MRI of the  brain without contrast within normal limits.      ECHOCARDIOGRAM COMP W/O CONT   Final Result      US-GALLBLADDER   Final Result      Small amount of perihepatic fluid and pericholecystic fluid.      No gallstones or gallbladder wall thickening is seen.      Enlarged, heterogeneous and echogenic appearance of the liver can be seen in hepatic steatosis or hepatocellular disease.      CT-CTA CHEST PULMONARY ARTERY W/ RECONS   Final Result      1.  No pulmonary embolus identified.      2.  Hepatic steatosis.            CT-HEAD W/O   Final Result         1. No acute intracranial abnormality. No evidence of acute intracranial hemorrhage or mass lesion.                 Discharge Medications:         Medication Reconciliation: Completed       Medication List      START taking these medications      Instructions   levetiracetam 500 MG Tabs  Commonly known as:  KEPPRA   Take 1 Tab by mouth 2 Times a Day.  Dose:  500 mg     potassium chloride SA 20 MEQ Tbcr  Commonly known as:  Kdur   Take 2 Tabs by mouth every day for 7 days.  Dose:  40 mEq        STOP taking these medications    NON SPECIFIED            Disposition:   Stable    Diet:   Regular    Activity:   As tolerated    Instructions:         The patient was instructed to return to the ER in the event of worsening symptoms. I have counseled the patient on the importance of compliance and the patient has agreed to proceed with all medical recommendations and follow up plan indicated above.   The patient understands that all medications come with benefits and risks. Risks may include permanent injury or death and these risks can be minimized with close reassessment and monitoring.        Primary Care Provider:   Dr. Truong in [place]    Follow up appointment details :    Please follow up with PCP in 1-2 weeks, left message at PCP office    Pending Studies:      None      Discharge Time (Minutes) :    Greater than 45 minutes spent on discharge day patient visit, preparing  discharge paperwork and arranging for patient follow up.    Hospital Course Type: Observation Stay      Condition on Discharge    ______________________________________________________________________    Interval history/exam for day of discharge:     Patient stable, no complaints at this time, denies any lightheadedness, dizziness, CP, palpitations    Vitals:    01/10/18 0402 01/10/18 0743 01/10/18 1124 01/10/18 1604   BP: 125/80 148/93 121/74 132/78   Pulse: 60 60 65 60   Resp: 20 19 19 19   Temp: 36.8 °C (98.2 °F) 37 °C (98.6 °F) 37.1 °C (98.7 °F) 36.7 °C (98.1 °F)   SpO2: 97% 98% 99% 93%   Weight:       Height:         Weight/BMI: Body mass index is 18.66 kg/m².  Pulse Oximetry: 93 %, O2 (LPM): 0, O2 Delivery: None (Room Air)    General: No acute distress  CVS: Regular rate and rhythm, no murmur appreciated  PULM: Clear to auscultation bilaterally,   Abd: soft, non-tender, non-distended  Neuro: CN 2-12 intact, normal muscle strength in upper and lower ext      Most Recent Labs:    Lab Results   Component Value Date/Time    WBC 4.1 (L) 01/10/2018 04:13 AM    RBC 4.32 (L) 01/10/2018 04:13 AM    HEMOGLOBIN 13.8 (L) 01/10/2018 04:13 AM    HEMATOCRIT 40.7 (L) 01/10/2018 04:13 AM    MCV 94.2 01/10/2018 04:13 AM    MCH 31.9 01/10/2018 04:13 AM    MCHC 33.9 01/10/2018 04:13 AM    MPV 10.6 01/10/2018 04:13 AM    NEUTSPOLYS 87.90 (H) 01/09/2018 02:00 AM    LYMPHOCYTES 3.80 (L) 01/09/2018 02:00 AM    MONOCYTES 7.50 01/09/2018 02:00 AM    EOSINOPHILS 0.00 01/09/2018 02:00 AM    BASOPHILS 0.20 01/09/2018 02:00 AM      Lab Results   Component Value Date/Time    SODIUM 130 (L) 01/10/2018 04:15 PM    POTASSIUM 3.4 (L) 01/10/2018 04:15 PM    CHLORIDE 97 01/10/2018 04:15 PM    CO2 24 01/10/2018 04:15 PM    GLUCOSE 79 01/10/2018 04:15 PM    BUN 9 01/10/2018 04:15 PM    CREATININE 0.66 01/10/2018 04:15 PM    CREATININE 0.9 10/24/2005 05:20 AM      Lab Results   Component Value Date/Time    ALTSGPT 123 (H) 01/10/2018 01:24 PM     ASTSGOT 109 (H) 01/10/2018 01:24 PM    ALKPHOSPHAT 89 01/10/2018 01:24 PM    TBILIRUBIN 1.0 01/10/2018 01:24 PM    DBILIRUBIN 0.4 01/09/2018 06:35 AM    ALBUMIN 3.8 01/10/2018 01:24 PM    GLOBULIN 2.8 01/10/2018 01:24 PM    INR 1.00 01/10/2018 01:24 PM    MACROCYTOSIS 1+ 10/15/2005 05:45 AM     Lab Results   Component Value Date/Time    PROTHROMBTM 12.9 01/10/2018 01:24 PM    INR 1.00 01/10/2018 01:24 PM

## 2018-01-09 NOTE — PROCEDURES
ROUTINE ELECTROENCEPHALOGRAM REPORT        Referring MD: Dr. Novak.      DOS:  1/9/2018 (total recording for 30 minutes).      INDICATION:  Cory Marmolejo 32 y.o. male presenting with syncope.      CURRENT ANTIEPILEPTIC REGIMEN: none.      TECHNIQUE: 30 channel routine electroencephalogram (EEG) was performed in accordance with the international 10-20 system. The study was reviewed in bipolar and referential montages. The recording examined the patient during wakeful and drowsy/sleep state(s).      DESCRIPTION OF THE RECORD:  During the wakefulness, the background showed a symmetrical 10 Hz alpha activity posteriorly with amplitude of 70 mV.  There was reactivity to eye closure/opening.  A normal anterior-posterior gradient was noted with faster beta frequencies seen anteriorly.  During drowsiness, theta/delta frequencies were seen.     During the sleep state, background shows diffuse high-amplitude 4-5 Hz delta activity.  Symmetrical high-amplitude sleep spindles and vertex sharps were seen in the leads over the central regions.      ACTIVATION PROCEDURES:   Hyperventilation was performed by the patient for a total of 3 minutes. The technician performing the test noted good effort.  No clear changes in the background, but frequent epileptiform discharges were seen.         N           Intermittent Photic stimulation was performed in a stepwise fashion from 1 to 30 Hz and elicited a normal response (photic driving), most noticeable in the posterior leads.        ICTAL AND/OR INTERICTAL FINDINGS:   Frequent polyspike and slow wave were noted during the study. These had a generalized appearance but most prominently noted in the left frontal and central regions. No clinical events or seizures were reported or recorded during the study.           EKG: sampling of the EKG recording demonstrated sinus rhythm.         INTERPRETATION:  This is an abnormal routine EEG recording in the awake, drowsy, and sleep state(s).   Frequent polyspike and slow wave complexes were noted during the study. These had a generalized appearance but most prominently noted in the left frontal and central regions. The patient is at increased risk for seizures. The findings suggest underlying area(s) of cortical irritability and or structural abnormality. No clinical events or seizures were reported or recorded during the study. Clinical and radiological correlation is recommended.     Note: updates provided to patient's RN.            Sandro Vo MD  Medical Director, Epilepsy and Neurodiagnostics.   Clinical  of Neurology [facility].   Diplomate in Neurology, Epilepsy, and Electrodiagnostic Medicine.   Office: [phone]  Fax: [phone]    PABLO / YOCASTA    DD:  01/09/2018 14:20:06  DT:  01/09/2018 15:12:47    D#:  [id number]  Job#:  [id number]

## 2018-01-09 NOTE — PROGRESS NOTES
Internal Medicine Interval Note  Note Author: Chay Novak M.D.     Name Cory Marmolejo 1985   Age/Sex 32 y.o. male   MRN [id number]   Code Status Full Code     After 5PM or if no immediate response to page, please call for cross-coverage  Attending/Team: Dr. Samayoa/Kemal See Patient List for primary contact information  Call [phone] to page    1st Call - Day Intern (R1):   Dr. Novak 2nd Call - Day Sr. Resident (R2/R3):   Dr. Perea     Reason for interval visit  (Principal Problem)   Syncope      Interval Problem Daily Status Update  (24 hours)   - Patient stable, no acute complaints, denies any CP, SOB, palpitations at this time, notes multiple TBIs in past with previous craniectomy as child  - Patient states that he drinks 4-5 shots a day, 4x a week, continue thiamine/folate/multivitamins  - Liver enzymes increased, Hep panel negative, Abd U/S pending, evidence of hepatic steatosis on CT  - EEG, ECHO, MRI brain pending  - Continue tele monitoring, monitor for withdrawal symptoms      Review of Systems   Constitutional: Negative for chills and fever.   HENT: Negative for congestion, hearing loss and sore throat.    Eyes: Negative for blurred vision, double vision and photophobia.   Respiratory: Negative for cough and wheezing.    Cardiovascular: Negative for chest pain, palpitations and leg swelling.   Gastrointestinal: Negative for abdominal pain, melena, nausea and vomiting.   Genitourinary: Negative for dysuria and urgency.   Musculoskeletal: Negative for myalgias.   Skin: Negative for rash.   Neurological: Positive for weakness. Negative for dizziness, sensory change and headaches.   Psychiatric/Behavioral: Positive for substance abuse (THC). Negative for depression.       Consultants/Specialty  None    Disposition  Observation    Quality Measures    Reviewed items::  EKG reviewed, Medications reviewed and Radiology images reviewed  Ferraro catheter::  No Ferraro  : No central  line.  DVT prophylaxis pharmacological::  Enoxaparin (Lovenox)  DVT prophylaxis - mechanical:  Not indicated at this time, ambulatory  Ulcer Prophylaxis::  No      Physical Exam     Vitals:    01/09/18 0700 01/09/18 0730 01/09/18 0808 01/09/18 1136   BP:   129/86 142/81   Pulse: 82 78 80 82   Resp: (!) 1  16 16   Temp:   37.4 °C (99.4 °F) 36.6 °C (97.8 °F)   SpO2: 97% 98% 98% 96%   Weight:   59 kg (130 lb 1.1 oz)    Height:         Body mass index is 18.66 kg/m². Weight: 59 kg (130 lb 1.1 oz)  Oxygen Therapy:  Pulse Oximetry: 96 %        Physical Exam   Constitutional: He is oriented to person, place, and time and well-developed, well-nourished, and in no distress.   HENT:   Head: Normocephalic and atraumatic.   Mouth/Throat: No oropharyngeal exudate.   Eyes: Conjunctivae are normal. Pupils are equal, round, and reactive to light. No scleral icterus.   Neck: Normal range of motion. Neck supple. No JVD present. No thyromegaly present.   Cardiovascular: Normal rate, regular rhythm and normal heart sounds.    Pulmonary/Chest: Effort normal and breath sounds normal. No respiratory distress. He has no wheezes.   Abdominal: Soft. Bowel sounds are normal. He exhibits no distension. There is no tenderness. There is no rebound.   Musculoskeletal: Normal range of motion. He exhibits no edema.   Neurological: He is alert and oriented to person, place, and time. No cranial nerve deficit.   Skin: No rash noted. No erythema.   Psychiatric: Affect normal.         Lab Data Review:   1/9/2018  10:49 AM    Recent Labs      01/09/18   0200   SODIUM  131*   POTASSIUM  3.9   CHLORIDE  97   CO2  21   BUN  16   CREATININE  0.69   MAGNESIUM  1.6   CALCIUM  8.4*       Recent Labs      01/09/18   0200  01/09/18   0635   ALTSGPT  152*   --    ASTSGOT  189*   --    ALKPHOSPHAT  82   --    TBILIRUBIN  1.7*   --    DBILIRUBIN   --   0.4   GLUCOSE  150*   --        Recent Labs      01/09/18   0200   RBC  4.52*   HEMOGLOBIN  14.9   HEMATOCRIT   40.8*   PLATELETCT  159*       Recent Labs      01/09/18   0200   WBC  8.1   NEUTSPOLYS  87.90*   LYMPHOCYTES  3.80*   MONOCYTES  7.50   EOSINOPHILS  0.00   BASOPHILS  0.20   ASTSGOT  189*   ALTSGPT  152*   ALKPHOSPHAT  82   TBILIRUBIN  1.7*         Assessment/Plan     * Syncope- (present on admission)   Assessment & Plan    - Patient presented with episode of syncope, no prodromal symptoms, +ve post-ictal state and tongue biting  - Patient has history of multiple TBIs, previous craniectomy  - EKG shows NSR, no acute abnormalities  - UDS negative  - Echo pending  - EEG pending  - MRI brain pending  - Likely caused by seizure disorder due to history of multiple TBIs however cannot rule out arrhythmia at this time, will consider possible discharge on anti-epileptic medication  - Seizure precautions        Transaminitis- (present on admission)   Assessment & Plan    - Transaminitis with elevated bilirubin   - Patient has a history of alcohol use, 4-5 shots a day x 4 days a week  - CTPE shows hepatic steatosis   - HIV, hepatitis panel negative  - RUQ ultrasound pending  - Continue folate/multivitamin/B12  - Continue to monitor for signs of withdrawl        Lactate blood increase- (present on admission)   Assessment & Plan    - Likely due to liver pathology, infection unlikely  - Resolved        Hyponatremia- (present on admission)   Assessment & Plan    - chronic hyponatermia   - per chart review was hyponatremic in 2005  - no medications, was on ssri 1.5 years prior  - continue to monitor

## 2018-01-09 NOTE — ASSESSMENT & PLAN NOTE
- Transaminitis with elevated bilirubin   - Patient has a history of alcohol use, 4-5 shots a day x 4 days a week  - CTPE shows hepatic steatosis   - HIV, hepatitis panel negative  - RUQ ultrasound shows enlarged, heterogeneous and echogenic appearance consistent with hepatic steatosis, no gallstones  - Continue folate/multivitamin/B12  - Educated to discontinue ETOH use

## 2018-01-10 ENCOUNTER — PATIENT OUTREACH (OUTPATIENT)
Dept: HEALTH INFORMATION MANAGEMENT | Facility: OTHER | Age: 33
End: 2018-01-10

## 2018-01-10 VITALS
DIASTOLIC BLOOD PRESSURE: 78 MMHG | HEART RATE: 60 BPM | RESPIRATION RATE: 19 BRPM | SYSTOLIC BLOOD PRESSURE: 132 MMHG | WEIGHT: 130.07 LBS | BODY MASS INDEX: 18.62 KG/M2 | OXYGEN SATURATION: 93 % | HEIGHT: 70 IN | TEMPERATURE: 98.1 F

## 2018-01-10 PROBLEM — R79.89 LACTATE BLOOD INCREASE: Status: RESOLVED | Noted: 2018-01-09 | Resolved: 2018-01-10

## 2018-01-10 PROBLEM — E87.6 HYPOKALEMIA: Status: ACTIVE | Noted: 2018-01-10

## 2018-01-10 PROBLEM — G40.909 SEIZURE DISORDER (HCC): Status: ACTIVE | Noted: 2018-01-09

## 2018-01-10 LAB
ALBUMIN SERPL BCP-MCNC: 3.8 G/DL (ref 3.2–4.9)
ALBUMIN/GLOB SERPL: 1.4 G/DL
ALP SERPL-CCNC: 89 U/L (ref 30–99)
ALT SERPL-CCNC: 123 U/L (ref 2–50)
ANION GAP SERPL CALC-SCNC: 10 MMOL/L (ref 0–11.9)
ANION GAP SERPL CALC-SCNC: 12 MMOL/L (ref 0–11.9)
ANION GAP SERPL CALC-SCNC: 9 MMOL/L (ref 0–11.9)
AST SERPL-CCNC: 109 U/L (ref 12–45)
BILIRUB SERPL-MCNC: 1 MG/DL (ref 0.1–1.5)
BUN SERPL-MCNC: 7 MG/DL (ref 8–22)
BUN SERPL-MCNC: 8 MG/DL (ref 8–22)
BUN SERPL-MCNC: 9 MG/DL (ref 8–22)
CALCIUM SERPL-MCNC: 8.5 MG/DL (ref 8.5–10.5)
CALCIUM SERPL-MCNC: 8.7 MG/DL (ref 8.5–10.5)
CALCIUM SERPL-MCNC: 9.1 MG/DL (ref 8.5–10.5)
CHLORIDE SERPL-SCNC: 101 MMOL/L (ref 96–112)
CHLORIDE SERPL-SCNC: 97 MMOL/L (ref 96–112)
CHLORIDE SERPL-SCNC: 98 MMOL/L (ref 96–112)
CO2 SERPL-SCNC: 22 MMOL/L (ref 20–33)
CO2 SERPL-SCNC: 24 MMOL/L (ref 20–33)
CO2 SERPL-SCNC: 24 MMOL/L (ref 20–33)
CREAT SERPL-MCNC: 0.56 MG/DL (ref 0.5–1.4)
CREAT SERPL-MCNC: 0.57 MG/DL (ref 0.5–1.4)
CREAT SERPL-MCNC: 0.66 MG/DL (ref 0.5–1.4)
EKG IMPRESSION: NORMAL
ERYTHROCYTE [DISTWIDTH] IN BLOOD BY AUTOMATED COUNT: 46.5 FL (ref 35.9–50)
GLOBULIN SER CALC-MCNC: 2.8 G/DL (ref 1.9–3.5)
GLUCOSE SERPL-MCNC: 109 MG/DL (ref 65–99)
GLUCOSE SERPL-MCNC: 79 MG/DL (ref 65–99)
GLUCOSE SERPL-MCNC: 81 MG/DL (ref 65–99)
HCT VFR BLD AUTO: 40.7 % (ref 42–52)
HGB BLD-MCNC: 13.8 G/DL (ref 14–18)
INR PPP: 1 (ref 0.87–1.13)
MCH RBC QN AUTO: 31.9 PG (ref 27–33)
MCHC RBC AUTO-ENTMCNC: 33.9 G/DL (ref 33.7–35.3)
MCV RBC AUTO: 94.2 FL (ref 81.4–97.8)
PLATELET # BLD AUTO: 106 K/UL (ref 164–446)
PMV BLD AUTO: 10.6 FL (ref 9–12.9)
POTASSIUM SERPL-SCNC: 2.8 MMOL/L (ref 3.6–5.5)
POTASSIUM SERPL-SCNC: 3.1 MMOL/L (ref 3.6–5.5)
POTASSIUM SERPL-SCNC: 3.4 MMOL/L (ref 3.6–5.5)
PROT SERPL-MCNC: 6.6 G/DL (ref 6–8.2)
PROTHROMBIN TIME: 12.9 SEC (ref 12–14.6)
RBC # BLD AUTO: 4.32 M/UL (ref 4.7–6.1)
SODIUM SERPL-SCNC: 130 MMOL/L (ref 135–145)
SODIUM SERPL-SCNC: 132 MMOL/L (ref 135–145)
SODIUM SERPL-SCNC: 135 MMOL/L (ref 135–145)
WBC # BLD AUTO: 4.1 K/UL (ref 4.8–10.8)

## 2018-01-10 PROCEDURE — 96375 TX/PRO/DX INJ NEW DRUG ADDON: CPT

## 2018-01-10 PROCEDURE — 80053 COMPREHEN METABOLIC PANEL: CPT

## 2018-01-10 PROCEDURE — A9270 NON-COVERED ITEM OR SERVICE: HCPCS | Performed by: STUDENT IN AN ORGANIZED HEALTH CARE EDUCATION/TRAINING PROGRAM

## 2018-01-10 PROCEDURE — G0378 HOSPITAL OBSERVATION PER HR: HCPCS

## 2018-01-10 PROCEDURE — 700111 HCHG RX REV CODE 636 W/ 250 OVERRIDE (IP): Performed by: STUDENT IN AN ORGANIZED HEALTH CARE EDUCATION/TRAINING PROGRAM

## 2018-01-10 PROCEDURE — 96365 THER/PROPH/DIAG IV INF INIT: CPT

## 2018-01-10 PROCEDURE — 85610 PROTHROMBIN TIME: CPT

## 2018-01-10 PROCEDURE — 93010 ELECTROCARDIOGRAM REPORT: CPT | Performed by: INTERNAL MEDICINE

## 2018-01-10 PROCEDURE — 96372 THER/PROPH/DIAG INJ SC/IM: CPT

## 2018-01-10 PROCEDURE — 700102 HCHG RX REV CODE 250 W/ 637 OVERRIDE(OP): Performed by: STUDENT IN AN ORGANIZED HEALTH CARE EDUCATION/TRAINING PROGRAM

## 2018-01-10 PROCEDURE — 80048 BASIC METABOLIC PNL TOTAL CA: CPT

## 2018-01-10 PROCEDURE — 700105 HCHG RX REV CODE 258: Performed by: INTERNAL MEDICINE

## 2018-01-10 PROCEDURE — 96366 THER/PROPH/DIAG IV INF ADDON: CPT

## 2018-01-10 PROCEDURE — 93005 ELECTROCARDIOGRAM TRACING: CPT | Performed by: STUDENT IN AN ORGANIZED HEALTH CARE EDUCATION/TRAINING PROGRAM

## 2018-01-10 PROCEDURE — 85027 COMPLETE CBC AUTOMATED: CPT

## 2018-01-10 PROCEDURE — 99217 PR OBSERVATION CARE DISCHARGE: CPT | Mod: GC | Performed by: INTERNAL MEDICINE

## 2018-01-10 RX ORDER — POTASSIUM CHLORIDE 20 MEQ/1
60 TABLET, EXTENDED RELEASE ORAL ONCE
Status: COMPLETED | OUTPATIENT
Start: 2018-01-10 | End: 2018-01-10

## 2018-01-10 RX ORDER — MAGNESIUM SULFATE 1 G/100ML
1 INJECTION INTRAVENOUS ONCE
Status: DISCONTINUED | OUTPATIENT
Start: 2018-01-10 | End: 2018-01-10 | Stop reason: HOSPADM

## 2018-01-10 RX ORDER — POTASSIUM CHLORIDE 20 MEQ/1
40 TABLET, EXTENDED RELEASE ORAL DAILY
Qty: 14 TAB | Refills: 0 | Status: SHIPPED | OUTPATIENT
Start: 2018-01-10 | End: 2018-01-17

## 2018-01-10 RX ORDER — POTASSIUM CHLORIDE 20 MEQ/1
40 TABLET, EXTENDED RELEASE ORAL ONCE
Status: COMPLETED | OUTPATIENT
Start: 2018-01-10 | End: 2018-01-10

## 2018-01-10 RX ORDER — POTASSIUM CHLORIDE 7.45 MG/ML
10 INJECTION INTRAVENOUS
Status: DISPENSED | OUTPATIENT
Start: 2018-01-10 | End: 2018-01-10

## 2018-01-10 RX ORDER — LEVETIRACETAM 500 MG/1
500 TABLET ORAL 2 TIMES DAILY
Qty: 90 TAB | Refills: 1 | Status: SHIPPED | OUTPATIENT
Start: 2018-01-10

## 2018-01-10 RX ADMIN — POTASSIUM CHLORIDE 40 MEQ: 1500 TABLET, EXTENDED RELEASE ORAL at 11:39

## 2018-01-10 RX ADMIN — FOLIC ACID 1 MG: 1 TABLET ORAL at 09:41

## 2018-01-10 RX ADMIN — LEVETIRACETAM 500 MG: 500 TABLET, FILM COATED ORAL at 18:43

## 2018-01-10 RX ADMIN — POTASSIUM CHLORIDE 60 MEQ: 1500 TABLET, EXTENDED RELEASE ORAL at 09:39

## 2018-01-10 RX ADMIN — Medication 100 MG: at 09:40

## 2018-01-10 RX ADMIN — SODIUM CHLORIDE: 9 INJECTION, SOLUTION INTRAVENOUS at 09:44

## 2018-01-10 RX ADMIN — POTASSIUM CHLORIDE 40 MEQ: 1500 TABLET, EXTENDED RELEASE ORAL at 14:28

## 2018-01-10 RX ADMIN — LEVETIRACETAM 500 MG: 500 TABLET, FILM COATED ORAL at 09:43

## 2018-01-10 RX ADMIN — THERA TABS 1 TABLET: TAB at 09:41

## 2018-01-10 RX ADMIN — ONDANSETRON 4 MG: 4 TABLET, ORALLY DISINTEGRATING ORAL at 09:44

## 2018-01-10 ASSESSMENT — ENCOUNTER SYMPTOMS
LOSS OF CONSCIOUSNESS: 0
ABDOMINAL PAIN: 0
SHORTNESS OF BREATH: 0
SEIZURES: 0
TREMORS: 0
CONSTIPATION: 0
VOMITING: 0
FOCAL WEAKNESS: 0
DIAPHORESIS: 0
NAUSEA: 0
DIARRHEA: 1

## 2018-01-10 ASSESSMENT — PAIN SCALES - GENERAL: PAINLEVEL_OUTOF10: 0

## 2018-01-10 NOTE — PROGRESS NOTES
Bedside report received 0705. POC discussed with pt; No cardiac events overnight; MD at bedside, EKG reviewed, Clarified K+ orders. MD discussed POC with pt at bedside; all questions answered at this time.
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
## 2018-01-10 NOTE — PROGRESS NOTES
Assessment completed. Pt is A&Ox4. Denies any nausea, pain, or dizziness at this time. Up independently to restroom, gait steady. Seizure precautions in place.

## 2018-01-11 NOTE — ASSESSMENT & PLAN NOTE
- Patient noted to have hypokalemia to 2.8  - EKG showed no T wave abnormalities, no QRS prolongation noted  - Replaced with oral and IV supplementation  - Will discharge on short-term K replacement, requires repeat BMP
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
## 2018-01-11 NOTE — PROGRESS NOTES
Pt dc'd home. IV and monitor removed; Pt left unit via walking,alone, Refused hospital escort. Personal belongings with pt when leaving unit. Pt given discharge instructions prior to leaving unit including prescription and when to visit with physician; verbalizes understanding. Copy of discharge instructions with pt and in the chart.

## 2018-01-11 NOTE — DISCHARGE INSTRUCTIONS
Discharge Instructions    Discharged to home by car with relative. Discharged via walking, hospital escort: Yes.  Special equipment needed: Not Applicable    Be sure to schedule a follow-up appointment with your primary care doctor or any specialists as instructed.     Discharge Plan:   Diet Plan: Discussed  Activity Level: Discussed  Confirmed Follow up Appointment: Patient to Call and Schedule Appointment  Confirmed Symptoms Management: Discussed  Medication Reconciliation Updated: Yes  Influenza Vaccine Indication: Patient Refuses    I understand that a diet low in cholesterol, fat, and sodium is recommended for good health. Unless I have been given specific instructions below for another diet, I accept this instruction as my diet prescription.   Other diet: Heart Healthy     Special Instructions: None    · Is patient discharged on Warfarin / Coumadin?   No     · Is patient Post Blood Transfusion?  No    Depression / Suicide Risk    As you are discharged from this [facility] facility, it is important to learn how to keep safe from harming yourself.    Recognize the warning signs:  · Abrupt changes in personality, positive or negative- including increase in energy   · Giving away possessions  · Change in eating patterns- significant weight changes-  positive or negative  · Change in sleeping patterns- unable to sleep or sleeping all the time   · Unwillingness or inability to communicate  · Depression  · Unusual sadness, discouragement and loneliness  · Talk of wanting to die  · Neglect of personal appearance   · Rebelliousness- reckless behavior  · Withdrawal from people/activities they love  · Confusion- inability to concentrate     If you or a loved one observes any of these behaviors or has concerns about self-harm, here's what you can do:  · Talk about it- your feelings and reasons for harming yourself  · Remove any means that you might use to hurt yourself (examples: pills, rope, extension cords,  firearm)  · Get professional help from the community (Mental Health, Substance Abuse, psychological counseling)  · Do not be alone:Call your Safe Contact- someone whom you trust who will be there for you.  · Call your local CRISIS HOTLINE 701-8292 or 657-519-2998  · Call your local Children's Mobile Crisis Response Team [place] (195) 635-8045 or www.[employer]  · Call the toll free National Suicide Prevention Hotlines   · National Suicide Prevention Lifeline 921-825-ETGO (6861)  · Try The World Line Network 800-SUICIDE (097-0891)    Levetiracetam tablets  What is this medicine?  LEVETIRACETAM (karan rachel brennan) is an antiepileptic drug. It is used with other medicines to treat certain types of seizures.  This medicine may be used for other purposes; ask your health care provider or pharmacist if you have questions.  COMMON BRAND NAME(S): Shadpplouie  What should I tell my health care provider before I take this medicine?  They need to know if you have any of these conditions:  -kidney disease  -suicidal thoughts, plans, or attempt; a previous suicide attempt by you or a family member  -an unusual or allergic reaction to levetiracetam, other medicines, foods, dyes, or preservatives  -pregnant or trying to get pregnant  -breast-feeding  How should I use this medicine?  Take this medicine by mouth with a glass of water. Follow the directions on the prescription label. Do not cut, crush, or chew this medicine. You may take this medicine with or without food. Take your doses at regular intervals. Do not take your medicine more often than directed. Do not stop taking this medicine or any of your seizure medicines unless instructed by your doctor or health care professional. Stopping your medicine suddenly can increase your seizures or their severity.  A special MedGuide will be given to you by the pharmacist with each prescription and refill. Be sure to read this information carefully each time.  Contact your  pediatrician or health care professional regarding the use of this medication in children. While this drug may be prescribed for children as young as 4 years of age for selected conditions, precautions do apply.  Overdosage: If you think you have taken too much of this medicine contact a poison control center or emergency room at once.  NOTE: This medicine is only for you. Do not share this medicine with others.  What if I miss a dose?  If you miss a dose, take it as soon as you can. If it is almost time for your next dose, take only that dose. Do not take double or extra doses.  What may interact with this medicine?  -sevelamer  This list may not describe all possible interactions. Give your health care provider a list of all the medicines, herbs, non-prescription drugs, or dietary supplements you use. Also tell them if you smoke, drink alcohol, or use illegal drugs. Some items may interact with your medicine.  What should I watch for while using this medicine?  Visit your doctor or health care professional for a regular check on your progress. Wear a medical identification bracelet or chain to say you have epilepsy, and carry a card that lists all your medications.  It is important to take this medicine exactly as instructed by your health care professional. When first starting treatment, your dose may need to be adjusted. It may take weeks or months before your dose is stable. You should contact your doctor or health care professional if your seizures get worse or if you have any new types of seizures.  You may get drowsy or dizzy. Do not drive, use machinery, or do anything that needs mental alertness until you know how this medicine affects you. Do not stand or sit up quickly, especially if you are an older patient. This reduces the risk of dizzy or fainting spells. Alcohol may interfere with the effect of this medicine. Avoid alcoholic drinks.  The use of this medicine may increase the chance of suicidal  thoughts or actions. Pay special attention to how you are responding while on this medicine. Any worsening of mood, or thoughts of suicide or dying should be reported to your health care professional right away.  Women who become pregnant while using this medicine may enroll in the [place] Antiepileptic Drug Pregnancy Registry by calling 1-304.464.7165. This registry collects information about the safety of antiepileptic drug use during pregnancy.  What side effects may I notice from receiving this medicine?  Side effects you should report to your doctor or health care professional as soon as possible:  -allergic reactions like skin rash, itching or hives, swelling of the face, lips, or tongue  -breathing problems  -dark urine  -general ill feeling or flu-like symptoms  -problems with balance, talking, walking  -unusually weak or tired  -worsening of mood, thoughts or actions of suicide or dying  -yellowing of the eyes or skin  Side effects that usually do not require medical attention (report to your doctor or health care professional if they continue or are bothersome):  -diarrhea  -dizzy, drowsy  -headache  -loss of appetite  This list may not describe all possible side effects. Call your doctor for medical advice about side effects. You may report side effects to FDA at 9-703-FDA-0551.  Where should I keep my medicine?  Keep out of reach of children.  Store at room temperature between 15 and 30 degrees C (59 and 86 degrees F). Throw away any unused medicine after the expiration date.  NOTE: This sheet is a summary. It may not cover all possible information. If you have questions about this medicine, talk to your doctor, pharmacist, or health care provider.  © 2014, [employer] Standard. (10/31/2012 12:23:20 PM)

## 2018-01-12 LAB — HIV 1 PRO DNA BLD QL NAA+PROBE: NOT DETECTED

## 2019-06-19 ENCOUNTER — APPOINTMENT (OUTPATIENT)
Dept: RADIOLOGY | Facility: MEDICAL CENTER | Age: 34
DRG: 896 | End: 2019-06-19
Attending: EMERGENCY MEDICINE
Payer: MEDICAID

## 2019-06-19 ENCOUNTER — HOSPITAL ENCOUNTER (INPATIENT)
Facility: MEDICAL CENTER | Age: 34
LOS: 3 days | DRG: 896 | End: 2019-06-22
Attending: EMERGENCY MEDICINE | Admitting: HOSPITALIST
Payer: MEDICAID

## 2019-06-19 DIAGNOSIS — R17 ELEVATED BILIRUBIN: ICD-10-CM

## 2019-06-19 DIAGNOSIS — F10.930 ALCOHOL WITHDRAWAL SYNDROME WITHOUT COMPLICATION (HCC): ICD-10-CM

## 2019-06-19 DIAGNOSIS — K70.10 ALCOHOLIC HEPATITIS, UNSPECIFIED WHETHER ASCITES PRESENT: ICD-10-CM

## 2019-06-19 DIAGNOSIS — E87.6 HYPOKALEMIA: ICD-10-CM

## 2019-06-19 DIAGNOSIS — R17 JAUNDICE: ICD-10-CM

## 2019-06-19 PROBLEM — E87.29 INCREASED ANION GAP METABOLIC ACIDOSIS: Status: ACTIVE | Noted: 2019-06-19

## 2019-06-19 PROBLEM — F10.939 ALCOHOL WITHDRAWAL (HCC): Status: ACTIVE | Noted: 2019-06-19

## 2019-06-19 PROBLEM — K72.90 LIVER FAILURE (HCC): Status: ACTIVE | Noted: 2019-06-19

## 2019-06-19 LAB
ALBUMIN SERPL BCP-MCNC: 3.7 G/DL (ref 3.2–4.9)
ALBUMIN SERPL BCP-MCNC: 4.6 G/DL (ref 3.2–4.9)
ALBUMIN/GLOB SERPL: 1.7 G/DL
ALBUMIN/GLOB SERPL: 1.8 G/DL
ALP SERPL-CCNC: 203 U/L (ref 30–99)
ALP SERPL-CCNC: 232 U/L (ref 30–99)
ALT SERPL-CCNC: 119 U/L (ref 2–50)
ALT SERPL-CCNC: 136 U/L (ref 2–50)
AMMONIA PLAS-SCNC: 88 UMOL/L (ref 11–45)
ANION GAP SERPL CALC-SCNC: 11 MMOL/L (ref 0–11.9)
ANION GAP SERPL CALC-SCNC: 19 MMOL/L (ref 0–11.9)
APTT PPP: 27.3 SEC (ref 24.7–36)
AST SERPL-CCNC: 200 U/L (ref 12–45)
AST SERPL-CCNC: 210 U/L (ref 12–45)
BILIRUB SERPL-MCNC: 10.5 MG/DL (ref 0.1–1.5)
BILIRUB SERPL-MCNC: 12.7 MG/DL (ref 0.1–1.5)
BUN SERPL-MCNC: 18 MG/DL (ref 8–22)
BUN SERPL-MCNC: 19 MG/DL (ref 8–22)
CALCIUM SERPL-MCNC: 8.6 MG/DL (ref 8.5–10.5)
CALCIUM SERPL-MCNC: 9.1 MG/DL (ref 8.5–10.5)
CHLORIDE SERPL-SCNC: 89 MMOL/L (ref 96–112)
CHLORIDE SERPL-SCNC: 98 MMOL/L (ref 96–112)
CO2 SERPL-SCNC: 25 MMOL/L (ref 20–33)
CO2 SERPL-SCNC: 29 MMOL/L (ref 20–33)
CREAT SERPL-MCNC: 0.69 MG/DL (ref 0.5–1.4)
CREAT SERPL-MCNC: 0.76 MG/DL (ref 0.5–1.4)
ERYTHROCYTE [DISTWIDTH] IN BLOOD BY AUTOMATED COUNT: 48.3 FL (ref 35.9–50)
GLOBULIN SER CALC-MCNC: 2.2 G/DL (ref 1.9–3.5)
GLOBULIN SER CALC-MCNC: 2.5 G/DL (ref 1.9–3.5)
GLUCOSE SERPL-MCNC: 105 MG/DL (ref 65–99)
GLUCOSE SERPL-MCNC: 88 MG/DL (ref 65–99)
HAV IGM SERPL QL IA: NEGATIVE
HBV CORE IGM SER QL: NEGATIVE
HBV SURFACE AG SER QL: NEGATIVE
HCT VFR BLD AUTO: 30.3 % (ref 42–52)
HCV AB SER QL: NEGATIVE
HGB BLD-MCNC: 10.7 G/DL (ref 14–18)
INR PPP: 1.11 (ref 0.87–1.13)
MAGNESIUM SERPL-MCNC: 1.9 MG/DL (ref 1.5–2.5)
MCH RBC QN AUTO: 34.2 PG (ref 27–33)
MCHC RBC AUTO-ENTMCNC: 35.3 G/DL (ref 33.7–35.3)
MCV RBC AUTO: 96.8 FL (ref 81.4–97.8)
PLATELET # BLD AUTO: 179 K/UL (ref 164–446)
PMV BLD AUTO: 11.2 FL (ref 9–12.9)
POTASSIUM SERPL-SCNC: 2.3 MMOL/L (ref 3.6–5.5)
POTASSIUM SERPL-SCNC: 4.1 MMOL/L (ref 3.6–5.5)
PROT SERPL-MCNC: 5.9 G/DL (ref 6–8.2)
PROT SERPL-MCNC: 7.1 G/DL (ref 6–8.2)
PROTHROMBIN TIME: 14.6 SEC (ref 12–14.6)
RBC # BLD AUTO: 3.13 M/UL (ref 4.7–6.1)
SODIUM SERPL-SCNC: 134 MMOL/L (ref 135–145)
SODIUM SERPL-SCNC: 137 MMOL/L (ref 135–145)
WBC # BLD AUTO: 4.2 K/UL (ref 4.8–10.8)

## 2019-06-19 PROCEDURE — 770020 HCHG ROOM/CARE - TELE (206)

## 2019-06-19 PROCEDURE — 96365 THER/PROPH/DIAG IV INF INIT: CPT

## 2019-06-19 PROCEDURE — 700111 HCHG RX REV CODE 636 W/ 250 OVERRIDE (IP): Performed by: EMERGENCY MEDICINE

## 2019-06-19 PROCEDURE — 82140 ASSAY OF AMMONIA: CPT

## 2019-06-19 PROCEDURE — 700102 HCHG RX REV CODE 250 W/ 637 OVERRIDE(OP): Performed by: HOSPITALIST

## 2019-06-19 PROCEDURE — 700102 HCHG RX REV CODE 250 W/ 637 OVERRIDE(OP): Performed by: EMERGENCY MEDICINE

## 2019-06-19 PROCEDURE — 85730 THROMBOPLASTIN TIME PARTIAL: CPT

## 2019-06-19 PROCEDURE — 85610 PROTHROMBIN TIME: CPT

## 2019-06-19 PROCEDURE — 80074 ACUTE HEPATITIS PANEL: CPT

## 2019-06-19 PROCEDURE — 99223 1ST HOSP IP/OBS HIGH 75: CPT | Performed by: HOSPITALIST

## 2019-06-19 PROCEDURE — A9270 NON-COVERED ITEM OR SERVICE: HCPCS | Performed by: EMERGENCY MEDICINE

## 2019-06-19 PROCEDURE — 700111 HCHG RX REV CODE 636 W/ 250 OVERRIDE (IP)

## 2019-06-19 PROCEDURE — 83735 ASSAY OF MAGNESIUM: CPT

## 2019-06-19 PROCEDURE — 94760 N-INVAS EAR/PLS OXIMETRY 1: CPT

## 2019-06-19 PROCEDURE — 96375 TX/PRO/DX INJ NEW DRUG ADDON: CPT

## 2019-06-19 PROCEDURE — A9270 NON-COVERED ITEM OR SERVICE: HCPCS | Performed by: HOSPITALIST

## 2019-06-19 PROCEDURE — 80053 COMPREHEN METABOLIC PANEL: CPT

## 2019-06-19 PROCEDURE — 99285 EMERGENCY DEPT VISIT HI MDM: CPT

## 2019-06-19 PROCEDURE — 700105 HCHG RX REV CODE 258: Performed by: HOSPITALIST

## 2019-06-19 PROCEDURE — 700111 HCHG RX REV CODE 636 W/ 250 OVERRIDE (IP): Performed by: HOSPITALIST

## 2019-06-19 PROCEDURE — 36415 COLL VENOUS BLD VENIPUNCTURE: CPT

## 2019-06-19 PROCEDURE — 76705 ECHO EXAM OF ABDOMEN: CPT

## 2019-06-19 PROCEDURE — 99358 PROLONG SERVICE W/O CONTACT: CPT | Performed by: HOSPITALIST

## 2019-06-19 PROCEDURE — 96366 THER/PROPH/DIAG IV INF ADDON: CPT

## 2019-06-19 PROCEDURE — 700101 HCHG RX REV CODE 250: Performed by: HOSPITALIST

## 2019-06-19 PROCEDURE — 85027 COMPLETE CBC AUTOMATED: CPT

## 2019-06-19 RX ORDER — SODIUM CHLORIDE 9 MG/ML
1000 INJECTION, SOLUTION INTRAVENOUS ONCE
Status: COMPLETED | OUTPATIENT
Start: 2019-06-19 | End: 2019-06-19

## 2019-06-19 RX ORDER — POLYETHYLENE GLYCOL 3350 17 G/17G
1 POWDER, FOR SOLUTION ORAL
Status: DISCONTINUED | OUTPATIENT
Start: 2019-06-19 | End: 2019-06-22 | Stop reason: HOSPADM

## 2019-06-19 RX ORDER — LORAZEPAM 2 MG/ML
2 INJECTION INTRAMUSCULAR
Status: DISCONTINUED | OUTPATIENT
Start: 2019-06-19 | End: 2019-06-22 | Stop reason: HOSPADM

## 2019-06-19 RX ORDER — THIAMINE MONONITRATE (VIT B1) 100 MG
100 TABLET ORAL DAILY
Status: DISCONTINUED | OUTPATIENT
Start: 2019-06-20 | End: 2019-06-22 | Stop reason: HOSPADM

## 2019-06-19 RX ORDER — POTASSIUM CHLORIDE 20 MEQ/1
40 TABLET, EXTENDED RELEASE ORAL DAILY
Status: DISCONTINUED | OUTPATIENT
Start: 2019-06-19 | End: 2019-06-22 | Stop reason: HOSPADM

## 2019-06-19 RX ORDER — LEVETIRACETAM 500 MG/1
500 TABLET ORAL 2 TIMES DAILY
Status: DISCONTINUED | OUTPATIENT
Start: 2019-06-19 | End: 2019-06-22 | Stop reason: HOSPADM

## 2019-06-19 RX ORDER — PROMETHAZINE HYDROCHLORIDE 25 MG/1
12.5-25 TABLET ORAL EVERY 4 HOURS PRN
Status: DISCONTINUED | OUTPATIENT
Start: 2019-06-19 | End: 2019-06-22 | Stop reason: HOSPADM

## 2019-06-19 RX ORDER — ONDANSETRON 2 MG/ML
4 INJECTION INTRAMUSCULAR; INTRAVENOUS ONCE
Status: COMPLETED | OUTPATIENT
Start: 2019-06-19 | End: 2019-06-19

## 2019-06-19 RX ORDER — LORAZEPAM 1 MG/1
1 TABLET ORAL EVERY 4 HOURS PRN
Status: DISCONTINUED | OUTPATIENT
Start: 2019-06-19 | End: 2019-06-22 | Stop reason: HOSPADM

## 2019-06-19 RX ORDER — ONDANSETRON 4 MG/1
4 TABLET, ORALLY DISINTEGRATING ORAL EVERY 4 HOURS PRN
Status: DISCONTINUED | OUTPATIENT
Start: 2019-06-19 | End: 2019-06-22 | Stop reason: HOSPADM

## 2019-06-19 RX ORDER — POTASSIUM CHLORIDE 7.45 MG/ML
10 INJECTION INTRAVENOUS
Status: DISPENSED | OUTPATIENT
Start: 2019-06-19 | End: 2019-06-19

## 2019-06-19 RX ORDER — POTASSIUM CHLORIDE 7.45 MG/ML
10 INJECTION INTRAVENOUS
Status: DISCONTINUED | OUTPATIENT
Start: 2019-06-19 | End: 2019-06-19

## 2019-06-19 RX ORDER — CLONIDINE HYDROCHLORIDE 0.1 MG/1
0.1 TABLET ORAL
Status: DISCONTINUED | OUTPATIENT
Start: 2019-06-19 | End: 2019-06-22 | Stop reason: HOSPADM

## 2019-06-19 RX ORDER — ONDANSETRON 2 MG/ML
INJECTION INTRAMUSCULAR; INTRAVENOUS
Status: COMPLETED
Start: 2019-06-19 | End: 2019-06-19

## 2019-06-19 RX ORDER — AMOXICILLIN 250 MG
2 CAPSULE ORAL 2 TIMES DAILY
Status: DISCONTINUED | OUTPATIENT
Start: 2019-06-19 | End: 2019-06-22 | Stop reason: HOSPADM

## 2019-06-19 RX ORDER — LORAZEPAM 2 MG/ML
1.5 INJECTION INTRAMUSCULAR
Status: DISCONTINUED | OUTPATIENT
Start: 2019-06-19 | End: 2019-06-22 | Stop reason: HOSPADM

## 2019-06-19 RX ORDER — ONDANSETRON 2 MG/ML
4 INJECTION INTRAMUSCULAR; INTRAVENOUS EVERY 4 HOURS PRN
Status: DISCONTINUED | OUTPATIENT
Start: 2019-06-19 | End: 2019-06-22 | Stop reason: HOSPADM

## 2019-06-19 RX ORDER — LORAZEPAM 0.5 MG/1
0.5 TABLET ORAL EVERY 4 HOURS PRN
Status: DISCONTINUED | OUTPATIENT
Start: 2019-06-19 | End: 2019-06-22 | Stop reason: HOSPADM

## 2019-06-19 RX ORDER — LORAZEPAM 2 MG/1
4 TABLET ORAL
Status: DISCONTINUED | OUTPATIENT
Start: 2019-06-19 | End: 2019-06-22 | Stop reason: HOSPADM

## 2019-06-19 RX ORDER — CHLORDIAZEPOXIDE HYDROCHLORIDE 25 MG/1
25 CAPSULE, GELATIN COATED ORAL ONCE
Status: COMPLETED | OUTPATIENT
Start: 2019-06-19 | End: 2019-06-19

## 2019-06-19 RX ORDER — FOLIC ACID 1 MG/1
1 TABLET ORAL DAILY
Status: DISCONTINUED | OUTPATIENT
Start: 2019-06-20 | End: 2019-06-22 | Stop reason: HOSPADM

## 2019-06-19 RX ORDER — POTASSIUM CHLORIDE 20 MEQ/1
60 TABLET, EXTENDED RELEASE ORAL ONCE
Status: COMPLETED | OUTPATIENT
Start: 2019-06-19 | End: 2019-06-19

## 2019-06-19 RX ORDER — BISACODYL 10 MG
10 SUPPOSITORY, RECTAL RECTAL
Status: DISCONTINUED | OUTPATIENT
Start: 2019-06-19 | End: 2019-06-22 | Stop reason: HOSPADM

## 2019-06-19 RX ORDER — PROMETHAZINE HYDROCHLORIDE 25 MG/1
12.5-25 SUPPOSITORY RECTAL EVERY 4 HOURS PRN
Status: DISCONTINUED | OUTPATIENT
Start: 2019-06-19 | End: 2019-06-22 | Stop reason: HOSPADM

## 2019-06-19 RX ORDER — LORAZEPAM 2 MG/ML
1 INJECTION INTRAMUSCULAR
Status: DISCONTINUED | OUTPATIENT
Start: 2019-06-19 | End: 2019-06-22 | Stop reason: HOSPADM

## 2019-06-19 RX ORDER — LORAZEPAM 2 MG/1
2 TABLET ORAL
Status: DISCONTINUED | OUTPATIENT
Start: 2019-06-19 | End: 2019-06-22 | Stop reason: HOSPADM

## 2019-06-19 RX ORDER — LORAZEPAM 2 MG/ML
0.5 INJECTION INTRAMUSCULAR EVERY 4 HOURS PRN
Status: DISCONTINUED | OUTPATIENT
Start: 2019-06-19 | End: 2019-06-22 | Stop reason: HOSPADM

## 2019-06-19 RX ADMIN — ONDANSETRON 4 MG: 2 INJECTION INTRAMUSCULAR; INTRAVENOUS at 03:30

## 2019-06-19 RX ADMIN — POTASSIUM CHLORIDE 10 MEQ: 7.46 INJECTION, SOLUTION INTRAVENOUS at 08:14

## 2019-06-19 RX ADMIN — LEVETIRACETAM 500 MG: 500 TABLET ORAL at 17:20

## 2019-06-19 RX ADMIN — POTASSIUM CHLORIDE 60 MEQ: 1500 TABLET, EXTENDED RELEASE ORAL at 03:30

## 2019-06-19 RX ADMIN — SODIUM CHLORIDE 1000 ML: 9 INJECTION, SOLUTION INTRAVENOUS at 05:30

## 2019-06-19 RX ADMIN — LORAZEPAM 0.5 MG: 0.5 TABLET ORAL at 14:32

## 2019-06-19 RX ADMIN — CHLORDIAZEPOXIDE HYDROCHLORIDE 25 MG: 25 CAPSULE ORAL at 01:36

## 2019-06-19 RX ADMIN — LEVETIRACETAM 500 MG: 500 TABLET ORAL at 05:32

## 2019-06-19 RX ADMIN — POTASSIUM CHLORIDE 10 MEQ: 7.46 INJECTION, SOLUTION INTRAVENOUS at 06:10

## 2019-06-19 RX ADMIN — POTASSIUM CHLORIDE 40 MEQ: 1500 TABLET, EXTENDED RELEASE ORAL at 05:32

## 2019-06-19 RX ADMIN — LORAZEPAM 2 MG: 2 TABLET ORAL at 22:17

## 2019-06-19 RX ADMIN — LORAZEPAM 0.5 MG: 0.5 TABLET ORAL at 10:06

## 2019-06-19 RX ADMIN — POTASSIUM CHLORIDE 10 MEQ: 7.46 INJECTION, SOLUTION INTRAVENOUS at 03:30

## 2019-06-19 RX ADMIN — THIAMINE HYDROCHLORIDE: 100 INJECTION, SOLUTION INTRAMUSCULAR; INTRAVENOUS at 10:06

## 2019-06-19 RX ADMIN — POTASSIUM CHLORIDE 10 MEQ: 7.46 INJECTION, SOLUTION INTRAVENOUS at 04:57

## 2019-06-19 RX ADMIN — LORAZEPAM 1 MG: 1 TABLET ORAL at 19:20

## 2019-06-19 ASSESSMENT — PATIENT HEALTH QUESTIONNAIRE - PHQ9
3. TROUBLE FALLING OR STAYING ASLEEP OR SLEEPING TOO MUCH: SEVERAL DAYS
8. MOVING OR SPEAKING SO SLOWLY THAT OTHER PEOPLE COULD HAVE NOTICED. OR THE OPPOSITE, BEING SO FIGETY OR RESTLESS THAT YOU HAVE BEEN MOVING AROUND A LOT MORE THAN USUAL: NOT AT ALL
7. TROUBLE CONCENTRATING ON THINGS, SUCH AS READING THE NEWSPAPER OR WATCHING TELEVISION: NOT AT ALL
6. FEELING BAD ABOUT YOURSELF - OR THAT YOU ARE A FAILURE OR HAVE LET YOURSELF OR YOUR FAMILY DOWN: NOT AL ALL
1. LITTLE INTEREST OR PLEASURE IN DOING THINGS: SEVERAL DAYS
9. THOUGHTS THAT YOU WOULD BE BETTER OFF DEAD, OR OF HURTING YOURSELF: NOT AT ALL
SUM OF ALL RESPONSES TO PHQ9 QUESTIONS 1 AND 2: 2
5. POOR APPETITE OR OVEREATING: SEVERAL DAYS
4. FEELING TIRED OR HAVING LITTLE ENERGY: SEVERAL DAYS
SUM OF ALL RESPONSES TO PHQ QUESTIONS 1-9: 5
2. FEELING DOWN, DEPRESSED, IRRITABLE, OR HOPELESS: SEVERAL DAYS

## 2019-06-19 ASSESSMENT — LIFESTYLE VARIABLES
NAUSEA AND VOMITING: NO NAUSEA AND NO VOMITING
AUDITORY DISTURBANCES: NOT PRESENT
DOES PATIENT WANT TO TALK TO SOMEONE ABOUT QUITTING: YES
VISUAL DISTURBANCES: NOT PRESENT
NAUSEA AND VOMITING: MILD NAUSEA WITH NO VOMITING
AGITATION: SOMEWHAT MORE THAN NORMAL ACTIVITY
TOTAL SCORE: 2
HEADACHE, FULLNESS IN HEAD: NOT PRESENT
HEADACHE, FULLNESS IN HEAD: NOT PRESENT
ANXIETY: MODERATELY ANXIOUS OR GUARDED, SO ANXIETY IS INFERRED
TREMOR: TREMOR NOT VISIBLE BUT CAN BE FELT, FINGERTIP TO FINGERTIP
HAVE YOU EVER FELT YOU SHOULD CUT DOWN ON YOUR DRINKING: YES
AGITATION: NORMAL ACTIVITY
HEADACHE, FULLNESS IN HEAD: NOT PRESENT
HEADACHE, FULLNESS IN HEAD: MILD
AGITATION: NORMAL ACTIVITY
ALCOHOL_USE: YES
TREMOR: *
ANXIETY: *
AUDITORY DISTURBANCES: NOT PRESENT
EVER HAD A DRINK FIRST THING IN THE MORNING TO STEADY YOUR NERVES TO GET RID OF A HANGOVER: YES
VISUAL DISTURBANCES: NOT PRESENT
TREMOR: *
AVERAGE NUMBER OF DAYS PER WEEK YOU HAVE A DRINK CONTAINING ALCOHOL: 7
VISUAL DISTURBANCES: NOT PRESENT
PAROXYSMAL SWEATS: BARELY PERCEPTIBLE SWEATING. PALMS MOIST
TOTAL SCORE: 13
ON A TYPICAL DAY WHEN YOU DRINK ALCOHOL HOW MANY DRINKS DO YOU HAVE: 6
ORIENTATION AND CLOUDING OF SENSORIUM: ORIENTED AND CAN DO SERIAL ADDITIONS
VISUAL DISTURBANCES: NOT PRESENT
NAUSEA AND VOMITING: NO NAUSEA AND NO VOMITING
PAROXYSMAL SWEATS: *
TREMOR: *
TREMOR: *
TOTAL SCORE: 6
AUDITORY DISTURBANCES: NOT PRESENT
DOES PATIENT WANT TO STOP DRINKING: YES
HAVE PEOPLE ANNOYED YOU BY CRITICIZING YOUR DRINKING: NO
TOTAL SCORE: 9
PAROXYSMAL SWEATS: BARELY PERCEPTIBLE SWEATING. PALMS MOIST
TOTAL SCORE: 10
ORIENTATION AND CLOUDING OF SENSORIUM: ORIENTED AND CAN DO SERIAL ADDITIONS
EVER FELT BAD OR GUILTY ABOUT YOUR DRINKING: NO
PAROXYSMAL SWEATS: *
EVER_SMOKED: YES
AGITATION: *
TREMOR: *
TOTAL SCORE: 3
PAROXYSMAL SWEATS: *
TOTAL SCORE: 2
TOTAL SCORE: 2
NAUSEA AND VOMITING: NO NAUSEA AND NO VOMITING
HEADACHE, FULLNESS IN HEAD: NOT PRESENT
ORIENTATION AND CLOUDING OF SENSORIUM: ORIENTED AND CAN DO SERIAL ADDITIONS
HEADACHE, FULLNESS IN HEAD: NOT PRESENT
ORIENTATION AND CLOUDING OF SENSORIUM: ORIENTED AND CAN DO SERIAL ADDITIONS
ANXIETY: MODERATELY ANXIOUS OR GUARDED, SO ANXIETY IS INFERRED
AUDITORY DISTURBANCES: NOT PRESENT
NAUSEA AND VOMITING: NO NAUSEA AND NO VOMITING
ANXIETY: MODERATELY ANXIOUS OR GUARDED, SO ANXIETY IS INFERRED
AUDITORY DISTURBANCES: NOT PRESENT
TOTAL SCORE: 6
HOW MANY TIMES IN THE PAST YEAR HAVE YOU HAD 5 OR MORE DRINKS IN A DAY: 350
VISUAL DISTURBANCES: NOT PRESENT
ORIENTATION AND CLOUDING OF SENSORIUM: ORIENTED AND CAN DO SERIAL ADDITIONS
PAROXYSMAL SWEATS: *
CONSUMPTION TOTAL: POSITIVE
AGITATION: SOMEWHAT MORE THAN NORMAL ACTIVITY
ANXIETY: *
AGITATION: NORMAL ACTIVITY
ORIENTATION AND CLOUDING OF SENSORIUM: ORIENTED AND CAN DO SERIAL ADDITIONS
NAUSEA AND VOMITING: NO NAUSEA AND NO VOMITING
ANXIETY: MILDLY ANXIOUS
VISUAL DISTURBANCES: NOT PRESENT
AUDITORY DISTURBANCES: NOT PRESENT

## 2019-06-19 ASSESSMENT — COGNITIVE AND FUNCTIONAL STATUS - GENERAL
WALKING IN HOSPITAL ROOM: A LITTLE
STANDING UP FROM CHAIR USING ARMS: A LITTLE
CLIMB 3 TO 5 STEPS WITH RAILING: A LITTLE
SUGGESTED CMS G CODE MODIFIER DAILY ACTIVITY: CH
SUGGESTED CMS G CODE MODIFIER MOBILITY: CJ
DAILY ACTIVITIY SCORE: 24
MOBILITY SCORE: 21

## 2019-06-19 ASSESSMENT — COPD QUESTIONNAIRES
DO YOU EVER COUGH UP ANY MUCUS OR PHLEGM?: NO/ONLY WITH OCCASIONAL COLDS OR INFECTIONS
HAVE YOU SMOKED AT LEAST 100 CIGARETTES IN YOUR ENTIRE LIFE: NO/DON'T KNOW
IN THE PAST 12 MONTHS DO YOU DO LESS THAN YOU USED TO BECAUSE OF YOUR BREATHING PROBLEMS: DISAGREE/UNSURE
DURING THE PAST 4 WEEKS HOW MUCH DID YOU FEEL SHORT OF BREATH: NONE/LITTLE OF THE TIME

## 2019-06-19 ASSESSMENT — ENCOUNTER SYMPTOMS
PALPITATIONS: 0
SORE THROAT: 0
MYALGIAS: 0
TINGLING: 0
SHORTNESS OF BREATH: 0
VOMITING: 1
PHOTOPHOBIA: 0
TREMORS: 1
DEPRESSION: 0
NAUSEA: 1
DIARRHEA: 0
FOCAL WEAKNESS: 0
HEADACHES: 0
FEVER: 0
ROS SKIN COMMENTS: JAUNDICE
CHILLS: 0
COUGH: 0
ABDOMINAL PAIN: 0
DIZZINESS: 0
WHEEZING: 0

## 2019-06-19 NOTE — CARE PLAN
Problem: Safety  Goal: Will remain free from falls  Outcome: PROGRESSING AS EXPECTED  Safety precautions and fall prevention interventions in place. Fall prevention education provided, pt verbalized understanding. Will reinforce as needed. Bed in low/locked position, bed alarm on, treaded socks on pt, call light within reach. Encouraged to call with needs.     Problem: Psychosocial Needs:  Goal: Level of anxiety will decrease  Outcome: PROGRESSING AS EXPECTED  CIWA protocol initiated. Assessment completed and pt medicated per MAR. Education provided regarding ETOH withdrawal treatment including symptoms, medications and side effects. Questions answered as needed, encouraged pt to voice further questions/conerns and to alert RN with any discomfort.

## 2019-06-19 NOTE — ED PROVIDER NOTES
ED Provider Note    Scribed for Raad Bruce M.D. by Tahir Mckee. 6/19/2019, 1:13 AM.    Primary care provider: Pcp Pt States None  Means of arrival: Hospital Transport  History obtained from: Patient   History limited by: None    CHIEF COMPLAINT  Chief Complaint   Patient presents with   • ETOH Withdrawal   • Abnormal Labs   • N/V       HPI  Cory Marmolejo is a 33 y.o. male who presents to the Emergency Department for evaluation of alcohol withdrawal onset about a week ago. Patient reports he is a chronic alcoholic and has been drinking for the past 3-5 years. He states he has been drinking a pint a day and his last drink was today. The patient noticed his skin started turning yellow 2 weeks ago, and notes that he has no history of cirrhosis or any forms of hepatitis. Patient endorses associated symptoms of chills, hot flashes, and vomiting, but denies any abdominal pain. His vomiting started about 3 days ago. He states he has not had a significant amount of time of being sober in the last 3-5 years. Patient denies any recent travels. The patient has a history of seizures during alcohol withdrawal and had a craniotomy in 1995.    REVIEW OF SYSTEMS  Pertinent positives include alcohol withdrawal, yellow skin, chills, vomiting, and hot flashes. Pertinent negatives include abdominal pain. All other systems negative.    PAST MEDICAL HISTORY   has a past medical history of Alcohol dependence (HCC).    SURGICAL HISTORY   has a past surgical history that includes craniotomy.    SOCIAL HISTORY  Social History   Substance Use Topics   • Smoking status: Never Smoker   • Smokeless tobacco: Never Used   • Alcohol use Yes      Comment: 1/3 of a half gallon a day of vodka      History   Drug Use   • Types: Marijuana, Inhaled     Comment: THC       FAMILY HISTORY  History reviewed. No pertinent family history.    CURRENT MEDICATIONS    Current Facility-Administered Medications:   •  potassium chloride (KCL) ivpb 10 mEq, 10 mEq,  "Intravenous, Q HOUR, Raad Bruce M.D., Last Rate: 100 mL/hr at 06/19/19 0330, 10 mEq at 06/19/19 0330    Current Outpatient Prescriptions:   •  levetiracetam (KEPPRA) 500 MG Tab, Take 1 Tab by mouth 2 Times a Day., Disp: 90 Tab, Rfl: 1      ALLERGIES  Allergies   Allergen Reactions   • Tylenol Nausea       PHYSICAL EXAM  VITAL SIGNS: Pulse (!) 59   Temp 37.1 °C (98.8 °F) (Temporal)   Ht 1.778 m (5' 10\")   Wt 45.4 kg (100 lb)   SpO2 100%   BMI 14.35 kg/m²     Constitutional: Appears cachetic, mild distress.   HENT: Normocephalic, Atraumatic, Oropharynx moist.   Eyes: Scleral icterus. No discharge.   Neck: Supple, No stridor   Cardiovascular: Normal heart rate, Normal rhythm, No murmurs, equal pulses.   Pulmonary: Normal breath sounds, No respiratory distress, No wheezing, No rales, No rhonchi.  Chest: No chest wall tenderness or deformity.   Abdomen: No hepatomegaly. Soft, No tenderness, No masses, no rebound, no guarding.   Back: No CVA tenderness.   Musculoskeletal: No significant tremors. No tremor to tongue. No major deformities noted, No tenderness.   Skin: I jaundice. Warm, Dry, No erythema, No rash.   Neurologic: Alert & oriented x 3, Normal motor function,  No focal deficits noted.   Psychiatric: Affect normal, Judgment normal, Mood normal.       LABS  Results for orders placed or performed during the hospital encounter of 06/19/19   HEPATITIS PANEL ACUTE(4 COMPONENTS)   Result Value Ref Range    Hepatitis B Surface Antigen Negative Negative    Hepatitis B Cors Ab,IgM Negative Negative    Hepatitis A Virus Ab, IgM Negative Negative    Hepatitis C Antibody Negative Negative   COMP METABOLIC PANEL   Result Value Ref Range    Sodium 137 135 - 145 mmol/L    Potassium 2.3 (LL) 3.6 - 5.5 mmol/L    Chloride 89 (L) 96 - 112 mmol/L    Co2 29 20 - 33 mmol/L    Anion Gap 19.0 (H) 0.0 - 11.9    Glucose 105 (H) 65 - 99 mg/dL    Bun 19 8 - 22 mg/dL    Creatinine 0.76 0.50 - 1.40 mg/dL    Calcium 9.1 8.5 - " 10.5 mg/dL    AST(SGOT) 210 (H) 12 - 45 U/L    ALT(SGPT) 136 (H) 2 - 50 U/L    Alkaline Phosphatase 232 (H) 30 - 99 U/L    Total Bilirubin 12.7 (H) 0.1 - 1.5 mg/dL    Albumin 4.6 3.2 - 4.9 g/dL    Total Protein 7.1 6.0 - 8.2 g/dL    Globulin 2.5 1.9 - 3.5 g/dL    A-G Ratio 1.8 g/dL   ESTIMATED GFR   Result Value Ref Range    GFR If African American >60 >60 mL/min/1.73 m 2    GFR If Non African American >60 >60 mL/min/1.73 m 2   APTT   Result Value Ref Range    APTT 27.3 24.7 - 36.0 sec   PROTHROMBIN TIME (INR)   Result Value Ref Range    PT 14.6 12.0 - 14.6 sec    INR 1.11 0.87 - 1.13     All labs reviewed by me.    RADIOLOGY  US-RUQ   Final Result         1.  Hepatomegaly and echogenic liver, compatible with fatty change versus fibrosis.   2.  Small amount of gallbladder sludge without additional sonographic findings of cholecystitis.        The radiologist's interpretation of all radiological studies have been reviewed by me.    COURSE & MEDICAL DECISION MAKING  Pertinent Labs & Imaging studies reviewed. (See chart for details)    1:13 AM - Reviewed labs at this time. I reviewed WBC, HGB, HCT, Platelet, Glucose level, BUN, Creatinine, Sodium, Potassium, Chloride, CO2, Bilirubin, Lipase level, AST, ALT, and Alkaline Phos.    1:13 AM - Patient seen and examined at bedside. Ordered US-RUQ, Hepatitis Panel Acute, and CMP to evaluate his symptoms. The differential diagnoses include but are not limited to: bilirubin obstruction, jaundice, hepatitis, and alcohol withdrawal.      Medical Decision Making: At this point time appears the patient has acute alcohol hepatitis and possible start of cirrhosis.  Patient shows early signs of alcohol withdrawal.  He will be treated with Librium.  Discussed the case with Dr. Rowland hospitalist she is agreed to admit the patient.    DISPOSITION:  Patient will be admitted to Dr. Rowland in guarded condition.      FINAL IMPRESSION  1. Alcoholic hepatitis, unspecified whether ascites  present    2. Elevated bilirubin    3. Alcohol withdrawal syndrome without complication (HCC)    4. Jaundice    5. Hypokalemia          Tahir SANCHEZ (Ivannaibe), am scribing for, and in the presence of, Raad Bruce M.D.    Electronically signed by: Tahir Mckee (Scribbrittney), 6/19/2019    Raad SANCHEZ M.D. personally performed the services described in this documentation, as scribed by Tahir Mckee in my presence, and it is both accurate and complete.    C.    The note accurately reflects work and decisions made by me.  Raad Bruce  6/19/2019  4:22 AM

## 2019-06-19 NOTE — DIETARY
"Nutrition Support Assessment:  Day 0 of admit.  Cory Marmolejo is a 33 y.o. male with admitting DX of Hepatitis  Hx of low BMI, ETOH withdrawal, nausea, emesis, liver failure, alcohol dependence, poor PO intake d/t intolerance (pt reports).      Current problem list:  1. Liver failure  2. Alcohol withdrawal  3. Hypokalemia      Assessment:  Estimated Nutritional Needs based on:   Height: 177.8 cm (5' 10\")  Weight: 43.2 kg (95 lb 3.8 oz)  Weight to Use in Calculations: 43.2 kg (95 lb 3.8 oz)  Ideal Body Weight: 75.4 kg (166 lb 5.4 oz)  Percent Ideal Body Weight: 57.3  Body mass index is 13.67 kg/m²., BMI classification: Low BMI    Calculation/Equation: MSJ x1.2  Total Calories / day: 1296 - 1667  (Calories / k - 39)  Total Grams Protein / day: 52 - 65  (Grams Protein / k.2 - 1.5)     Evaluation:   1.  Indication for nutrition support: patient with severe malnutrition status, poor PO intake at meals now and PTA d/t what patient reports as intolerance marked by nausea and emesis.   -per discussion with patient today, he reports that he has been unable to \"keep any food down\" over the last 1-2 months d/t unknown reasons.  This has resulted in a 20 lb weight loss x2 months, pt states.    -he is currently on Hepatic diet - consumed 0% of breakfast tray, aside from 1/2 cup orange juice, which pt states he tolerated this morning.  Lunch tray arrived during interview with dietitian.  RD reviewed components of pt's lunch tray and encouraged protein sources first.  Pt reported that he would have difficulty consuming the entire portion of protein on most of his trays; therefore, high protein supplements also added TID in between meals.    -patient missing teeth noted     -at this time, patient prefers to try eating combination of meals and high protein supplements to obtain adequate kcal and protein vs tube feeding.  He is agreeable to Cortrak placement, if PO intake remains sub-optimal over the next 2-3 meals (n=3).   2.  " Enteral access: recommend small bowel Cortrak placement   3. Labs: sodium 134, , AST//119, alk phos 203, Tbili 10.5  4. Meds: Kdur, senna-docusate, thiamine, Theragran, Folvite  5. On visual exam, patient noted to have prominent muscle wasting to temple region (slight depression), clavicle bone region (protrusion), dorsal hand region (slight depression), anterior thigh region (depression along thighs; very thin), posterior calf region (slight firmness), interosseus spaces of forearms (marked depression).  6. +BM 6/19  7. No pressure ulcers noted per chart review.      Malnutrition Risk: Patient with severe malnutrition in the context of chronic disease related and starvation related malnutrition r/t hx liver disease and poor PO intake over the last several months, as evidenced by severe muscle loss, severe weight loss of 17.3%, reduced PO intake to <10% of prior dietary regimen, pt reports.       Recommendations/Plan:  1. Plan is to monitor patient's PO intake over the next 12 hours - if he eats <25% of meals and supplements, recommend placing small bowel Cortrak and starting Impact Peptide 1.5 at goal rate of 20 ml/hr = 720 kcal, 45 grams protein, 370 ml free water/day + PO diet.    -TF to start at meeting ~48% of estimated kcal needs and 70% estimated protein needs daily   -keep PO diet orders in place and encourage patient to eat and drink supplements to meet remainder of kcal and protein needs.  PO supplements BID alone will provide ~900 kcal, 35 grams protein/day.    -RD to monitor PO intake and will adjust TF accordingly.   2. Continue daily thiamine 100 mg to reduce risk for refeeding syndrome.   3. Fluids per MD.

## 2019-06-19 NOTE — PROGRESS NOTES
Received report from ROEL Brock. Pt arrived to unit at 0530 via gurney escorted by ROEL bennett. Assessment complete. VSS. No signs of distress noted at this time. Tele monitor in place. Monitor room notified. Pt denies any pain. Fall precautions and appropriate signs in place. Pt oriented to unit routine, call light/phone system within reach. Personal belongings within reach. RN extension number provided. Pt educated regarding fall precautions. Bed alarm is on.  isolation precautions in place. Pt denies any further needs at this time.

## 2019-06-19 NOTE — ASSESSMENT & PLAN NOTE
On initial evaluation he did already been given Ativan and Librium in the ED.  -Correct electrolytes as needed  -CIWA protocol, last score of 4  -Monitor on telemetry  -Thiamine and folic acid

## 2019-06-19 NOTE — ASSESSMENT & PLAN NOTE
Potassium low at 2.3 on admission.  Magnesium normal.  Improved today   -Continue to monitor and replete

## 2019-06-19 NOTE — ED NOTES
Bedside report to Charli SEVILLA.  Pt transported to T715-02 in stable condition with all belongings in possession.

## 2019-06-19 NOTE — CARE PLAN
Problem: Nutritional:  Goal: Achieve adequate nutritional intake  Patient will consume 50% of meals + 50% of supplements  Outcome: NOT MET

## 2019-06-19 NOTE — ASSESSMENT & PLAN NOTE
Long-standing history of alcohol abuse, suspect acute on chronic liver failure with alcoholic hepatitis. Current MELD score is 18 but fortunately INR is not elevated.  Ultrasound shows no biliary obstruction and acute hepatitis panel is negative.  -Ammonia elevated to 88--> 67, continue lactulose  -Will need to have outpatient follow-up with GI  -holding off on Lasix, Aldactone, and rifaximin for now as he shows no evidence of decompensated hepatitis.

## 2019-06-19 NOTE — ED TRIAGE NOTES
Chief Complaint   Patient presents with   • ETOH Withdrawal   • Abnormal Labs   • N/V       Pt transferred from [facility] by ems for etoh withdrawal, last drink was Monday at 9:00pm. Pt reports hx of detoxing with seizures, no reports of seizures today.    Pt also in acute liver failure per ems, bili 10.1, K+ 2.0, 3.8 Lactic Acid.   Pt given 40mEq PO K+, 10mEq IV K+, 4mg Zofran, 1mg Ativan, and NS pta.   At this time, pt reports some anxiety, and is moderately tremulous. VSS. ERP to see.

## 2019-06-19 NOTE — PROGRESS NOTES
2 RN Skin Check Done;    Noted generalized jaundice with yellow sclera. Patient appears well groomed but severely malnourished. Otherwise all skin surfaces intact.

## 2019-06-19 NOTE — H&P
Hospital Medicine History & Physical Note    Date of Service  6/19/2019    Primary Care Physician  Pcp Pt States None    Consultants  None    Code Status  Full    Chief Complaint  Chief Complaint   Patient presents with   • ETOH Withdrawal   • Abnormal Labs   • N/V       History of Presenting Illness  33 y.o. male who presented on 6/19/2019 in transfer from outside facility for liver failure with alcohol withdrawal.  This is a young gentleman with a known history of alcoholism and presented himself to an outside facility with complaints of symptoms of alcohol withdrawal.  He reported feeling tremulous, anxious, weak, worsening jaundice, and nausea with vomiting.  Patient admits to drinking 1/5 of hard liquor per day and has done so over the last 5 years.  However because of his clinical symptoms, he has been unable to drink alcohol and his last drink was     At the outside facility, the patient received Ativan with clinical improvement in his symptoms and he was noted to have a new liver failure.  He was transferred to our facility for higher level of care.  Additional work-up including WBC 5.8 hemoglobin 13.3 hematocrit 3 7.0 glucose 144 sodium 155 potassium 2.0 CO2 93 chloride 106 BUN 1 1.01  ALT alkaline phosph.  Patient was transferred to our facility for higher level of care and possible specialist consultation.      Review of Systems  Review of Systems   Constitutional: Negative for chills and fever.   HENT: Negative for congestion and sore throat.    Eyes: Negative for photophobia.   Respiratory: Negative for cough, shortness of breath and wheezing.    Cardiovascular: Negative for chest pain and palpitations.   Gastrointestinal: Positive for nausea and vomiting. Negative for abdominal pain and diarrhea.   Genitourinary: Negative for dysuria.   Musculoskeletal: Negative for myalgias.   Skin: Negative.         Jaundice   Neurological: Positive for tremors. Negative for dizziness, tingling, focal weakness  and headaches.   Psychiatric/Behavioral: Negative for depression and suicidal ideas.       Past Medical History  Past Medical History:   Diagnosis Date   • Alcohol dependence (HCC)        Surgical History  Past Surgical History:   Procedure Laterality Date   • CRANIOTOMY         Family History  History reviewed. No pertinent family history.    Social History  Social History   Substance Use Topics   • Smoking status: Never Smoker   • Smokeless tobacco: Never Used   • Alcohol use Yes      Comment: 1/3 of a half gallon a day of vodka       Allergies  Allergies   Allergen Reactions   • Tylenol Nausea       Medications  No current facility-administered medications on file prior to encounter.      Current Outpatient Prescriptions on File Prior to Encounter   Medication Sig Dispense Refill   • levetiracetam (KEPPRA) 500 MG Tab Take 1 Tab by mouth 2 Times a Day. 90 Tab 1       Physical Exam  Hemodynamics  Temp (24hrs), Av.1 °C (98.8 °F), Min:37.1 °C (98.8 °F), Max:37.1 °C (98.8 °F)   Temperature: 37.1 °C (98.8 °F)  Pulse  Av.2  Min: 56  Max: 66 Heart Rate (Monitored): 61  Blood Pressure: 135/78, NIBP: 145/94      Respiratory      Respiration: 20, Pulse Oximetry: 98 %             Physical Exam   Constitutional: He is oriented to person, place, and time. No distress.   HENT:   Head: Normocephalic and atraumatic.   Right Ear: External ear normal.   Left Ear: External ear normal.   Eyes: EOM are normal. Right eye exhibits no discharge. Left eye exhibits no discharge. Scleral icterus is present.   Neck: Neck supple. No JVD present.   Cardiovascular: Normal rate, regular rhythm and normal heart sounds.    Pulmonary/Chest: Effort normal and breath sounds normal. No respiratory distress. He exhibits no tenderness.   Abdominal: Soft. Bowel sounds are normal. He exhibits no distension. There is no tenderness.   Musculoskeletal: He exhibits no edema.   Neurological: He is alert and oriented to person, place, and time. No  cranial nerve deficit.   Skin: Skin is dry. He is not diaphoretic. No erythema.   Jaundice   Psychiatric: He has a normal mood and affect. His behavior is normal.   Nursing note and vitals reviewed.    Capillary refill less than 3 seconds, distal pulses intact    Laboratory:      Recent Labs      06/19/19   0125   SODIUM  137   POTASSIUM  2.3*   CHLORIDE  89*   CO2  29   GLUCOSE  105*   BUN  19   CREATININE  0.76   CALCIUM  9.1     Recent Labs      06/19/19   0125   ALTSGPT  136*   ASTSGOT  210*   ALKPHOSPHAT  232*   TBILIRUBIN  12.7*   GLUCOSE  105*     Recent Labs      06/19/19   0125   APTT  27.3   INR  1.11             Lab Results   Component Value Date    TROPONINI <0.01 01/09/2018       Imaging  Us-ruq    Result Date: 6/19/2019 6/19/2019 1:32 AM HISTORY/REASON FOR EXAM:  Abnormal Labs, Abdominal pain TECHNIQUE/EXAM DESCRIPTION AND NUMBER OF VIEWS:  Real-time sonography of the liver and biliary tree. COMPARISON: None FINDINGS: The liver is normal in contour. There is no evidence of solid mass lesion. The liver measures 18.08 cm. The liver is echogenic. Gallbladder sludge is seen. There is no evidence of cholelithiasis.  The gallbladder wall thickness measures 0.16 cm. There is no pericholecystic fluid. The common duct measures 0.31 cm. The visualized pancreas is unremarkable. The visualized aorta is normal in caliber. Intrahepatic IVC is patent. The portal vein is patent with hepatopetal flow. The MPV measures cm. The right kidney measures 10.78 cm. There is no ascites.     1.  Hepatomegaly and echogenic liver, compatible with fatty change versus fibrosis. 2.  Small amount of gallbladder sludge without additional sonographic findings of cholecystitis.        Assessment/Plan:  Anticipate that patient will need greater than 2 midnights for management of the discussed medical issues.    * Liver failure (HCC)   Assessment & Plan    Given the patient's long-standing history of alcohol abuse, suspect acute on  chronic liver failure with alcoholic hepatitis.  His current MELD score is 18 but fortunately he has no altered mentation and INR is not elevated.  Ultrasound of the abdomen shows no evidence of obstruction in the biliary tree and his acute hepatitis panel is negative.  He will be admitted to the hospital, I will check an ammonia level and if this is elevated then I will start him on lactulose.  I am holding off on Lasix, Aldactone, and rifaximin for now as he shows no evidence of decompensated hepatitis.  I will however continue to hold any sedating medications or other hepatically cleared medications.  He would benefit from a GI consultation, if he has no worsening, this can likely be done on an outpatient basis.     Alcohol withdrawal (HCC)   Assessment & Plan    Patient has been comfortable in the emergency room however this is partially because he received fast acting Ativan at the outside facility and then was given an episode of long-acting Librium in the emergency room.  He reports a history of seizures from alcohol withdrawal therefore I will continue his home Keppra, monitor him on telemetry, correct electrolytes and control his blood pressure.  He will receive a rally bag and then been transitioned supplements.  I have started him on the CIWA protocol with low tolerance for transfer to the ICU to begin phenobarbital if needed.     Hypokalemia- (present on admission)   Assessment & Plan    Check magnesium, patient will be receiving both p.o. and IV correction.  We will also correct magnesium if needed.         Prophylaxis: Sequential compression devices for DVT prophylaxis, no PPI indicated, bowel protocol as needed      I spent a total of 35 minutes of non face to face time performing additional research, reviewing medical records from transferring facility, discussing plan of care with other healthcare providers. Start time: 2:55AM. End time: 3:00AM.

## 2019-06-19 NOTE — ASSESSMENT & PLAN NOTE
Secondary to alcohol intoxication, most likely prerenal azotemia.  Will continue IV fluid hydration and repeat chemistries to ensure correction.

## 2019-06-19 NOTE — PROGRESS NOTES
Patient was seen and examined by my colleague after midnight. Please refer to the H&P done by Dr. Rowland on 6/19/2019 for more details.    I personally saw and examined the patient today. Very tired this AM, takes significant effort to wake. Denies vomiting, still very nauseated. Dietary recommends cortrak given his poor PO intake and BMI of 13.67.

## 2019-06-19 NOTE — PROGRESS NOTES
Bedside report received. Discussed pt hx, current status and POC. Pt sleeping in bed. Unlabored breathing on room air. Tele monitor in place, SR on monitor. Bed in low/locked position, bed alarm on, treaded socks on pt. Call bell within reach. Will continue to monitor.

## 2019-06-20 PROBLEM — E43 SEVERE PROTEIN-CALORIE MALNUTRITION (HCC): Status: ACTIVE | Noted: 2019-06-20

## 2019-06-20 PROBLEM — G93.41 METABOLIC ENCEPHALOPATHY: Status: ACTIVE | Noted: 2019-06-20

## 2019-06-20 PROBLEM — E87.29 INCREASED ANION GAP METABOLIC ACIDOSIS: Status: RESOLVED | Noted: 2019-06-19 | Resolved: 2019-06-20

## 2019-06-20 PROBLEM — K76.82 HEPATIC ENCEPHALOPATHY (HCC): Status: ACTIVE | Noted: 2019-06-20

## 2019-06-20 LAB
ALBUMIN SERPL BCP-MCNC: 3.4 G/DL (ref 3.2–4.9)
ALBUMIN/GLOB SERPL: 1.6 G/DL
ALP SERPL-CCNC: 202 U/L (ref 30–99)
ALT SERPL-CCNC: 121 U/L (ref 2–50)
ANION GAP SERPL CALC-SCNC: 9 MMOL/L (ref 0–11.9)
AST SERPL-CCNC: 236 U/L (ref 12–45)
BILIRUB SERPL-MCNC: 9 MG/DL (ref 0.1–1.5)
BUN SERPL-MCNC: 13 MG/DL (ref 8–22)
CALCIUM SERPL-MCNC: 8.4 MG/DL (ref 8.5–10.5)
CHLORIDE SERPL-SCNC: 97 MMOL/L (ref 96–112)
CO2 SERPL-SCNC: 27 MMOL/L (ref 20–33)
CREAT SERPL-MCNC: 0.65 MG/DL (ref 0.5–1.4)
ERYTHROCYTE [DISTWIDTH] IN BLOOD BY AUTOMATED COUNT: 48.3 FL (ref 35.9–50)
GLOBULIN SER CALC-MCNC: 2.1 G/DL (ref 1.9–3.5)
GLUCOSE SERPL-MCNC: 112 MG/DL (ref 65–99)
HCT VFR BLD AUTO: 30.3 % (ref 42–52)
HGB BLD-MCNC: 10.7 G/DL (ref 14–18)
MCH RBC QN AUTO: 34.5 PG (ref 27–33)
MCHC RBC AUTO-ENTMCNC: 35.3 G/DL (ref 33.7–35.3)
MCV RBC AUTO: 97.7 FL (ref 81.4–97.8)
PLATELET # BLD AUTO: 179 K/UL (ref 164–446)
PMV BLD AUTO: 11.2 FL (ref 9–12.9)
POTASSIUM SERPL-SCNC: 3.2 MMOL/L (ref 3.6–5.5)
PROT SERPL-MCNC: 5.5 G/DL (ref 6–8.2)
RBC # BLD AUTO: 3.1 M/UL (ref 4.7–6.1)
SODIUM SERPL-SCNC: 133 MMOL/L (ref 135–145)
WBC # BLD AUTO: 3.6 K/UL (ref 4.8–10.8)

## 2019-06-20 PROCEDURE — 85027 COMPLETE CBC AUTOMATED: CPT

## 2019-06-20 PROCEDURE — A9270 NON-COVERED ITEM OR SERVICE: HCPCS | Performed by: HOSPITALIST

## 2019-06-20 PROCEDURE — 99232 SBSQ HOSP IP/OBS MODERATE 35: CPT | Performed by: HOSPITALIST

## 2019-06-20 PROCEDURE — 36415 COLL VENOUS BLD VENIPUNCTURE: CPT

## 2019-06-20 PROCEDURE — 80053 COMPREHEN METABOLIC PANEL: CPT

## 2019-06-20 PROCEDURE — 700102 HCHG RX REV CODE 250 W/ 637 OVERRIDE(OP): Performed by: HOSPITALIST

## 2019-06-20 PROCEDURE — 770020 HCHG ROOM/CARE - TELE (206)

## 2019-06-20 RX ORDER — LACTULOSE 20 G/30ML
30 SOLUTION ORAL 3 TIMES DAILY
Status: DISCONTINUED | OUTPATIENT
Start: 2019-06-20 | End: 2019-06-22 | Stop reason: HOSPADM

## 2019-06-20 RX ADMIN — Medication 100 MG: at 05:18

## 2019-06-20 RX ADMIN — THERA TABS 1 TABLET: TAB at 05:17

## 2019-06-20 RX ADMIN — LEVETIRACETAM 500 MG: 500 TABLET ORAL at 05:17

## 2019-06-20 RX ADMIN — SENNOSIDES, DOCUSATE SODIUM 2 TABLET: 50; 8.6 TABLET, FILM COATED ORAL at 05:17

## 2019-06-20 RX ADMIN — LORAZEPAM 1 MG: 1 TABLET ORAL at 00:25

## 2019-06-20 RX ADMIN — LORAZEPAM 0.5 MG: 0.5 TABLET ORAL at 17:13

## 2019-06-20 RX ADMIN — LORAZEPAM 0.5 MG: 0.5 TABLET ORAL at 12:21

## 2019-06-20 RX ADMIN — LORAZEPAM 1 MG: 1 TABLET ORAL at 21:39

## 2019-06-20 RX ADMIN — FOLIC ACID 1 MG: 1 TABLET ORAL at 05:17

## 2019-06-20 RX ADMIN — LORAZEPAM 1 MG: 1 TABLET ORAL at 04:12

## 2019-06-20 RX ADMIN — LEVETIRACETAM 500 MG: 500 TABLET ORAL at 17:13

## 2019-06-20 RX ADMIN — POTASSIUM CHLORIDE 40 MEQ: 1500 TABLET, EXTENDED RELEASE ORAL at 05:18

## 2019-06-20 ASSESSMENT — LIFESTYLE VARIABLES
TOTAL SCORE: 6
TOTAL SCORE: 10
HEADACHE, FULLNESS IN HEAD: MILD
NAUSEA AND VOMITING: MILD NAUSEA WITH NO VOMITING
HEADACHE, FULLNESS IN HEAD: MILD
AUDITORY DISTURBANCES: NOT PRESENT
VISUAL DISTURBANCES: NOT PRESENT
ORIENTATION AND CLOUDING OF SENSORIUM: ORIENTED AND CAN DO SERIAL ADDITIONS
VISUAL DISTURBANCES: NOT PRESENT
AGITATION: SOMEWHAT MORE THAN NORMAL ACTIVITY
TREMOR: *
VISUAL DISTURBANCES: NOT PRESENT
TREMOR: TREMOR NOT VISIBLE BUT CAN BE FELT, FINGERTIP TO FINGERTIP
TOTAL SCORE: 9
ORIENTATION AND CLOUDING OF SENSORIUM: ORIENTED AND CAN DO SERIAL ADDITIONS
TREMOR: *
AGITATION: NORMAL ACTIVITY
NAUSEA AND VOMITING: NO NAUSEA AND NO VOMITING
ORIENTATION AND CLOUDING OF SENSORIUM: ORIENTED AND CAN DO SERIAL ADDITIONS
AGITATION: SOMEWHAT MORE THAN NORMAL ACTIVITY
ANXIETY: NO ANXIETY (AT EASE)
TOTAL SCORE: 5
VISUAL DISTURBANCES: NOT PRESENT
PAROXYSMAL SWEATS: *
ORIENTATION AND CLOUDING OF SENSORIUM: ORIENTED AND CAN DO SERIAL ADDITIONS
NAUSEA AND VOMITING: MILD NAUSEA WITH NO VOMITING
AUDITORY DISTURBANCES: NOT PRESENT
NAUSEA AND VOMITING: MILD NAUSEA WITH NO VOMITING
AUDITORY DISTURBANCES: NOT PRESENT
TREMOR: *
HEADACHE, FULLNESS IN HEAD: VERY MILD
SUBSTANCE_ABUSE: 1
NAUSEA AND VOMITING: MILD NAUSEA WITH NO VOMITING
NAUSEA AND VOMITING: MILD NAUSEA WITH NO VOMITING
ANXIETY: MILDLY ANXIOUS
NAUSEA AND VOMITING: MILD NAUSEA WITH NO VOMITING
AGITATION: NORMAL ACTIVITY
VISUAL DISTURBANCES: NOT PRESENT
HEADACHE, FULLNESS IN HEAD: NOT PRESENT
AUDITORY DISTURBANCES: NOT PRESENT
TREMOR: TREMOR NOT VISIBLE BUT CAN BE FELT, FINGERTIP TO FINGERTIP
HEADACHE, FULLNESS IN HEAD: MILD
VISUAL DISTURBANCES: NOT PRESENT
TREMOR: TREMOR NOT VISIBLE BUT CAN BE FELT, FINGERTIP TO FINGERTIP
PAROXYSMAL SWEATS: BARELY PERCEPTIBLE SWEATING. PALMS MOIST
PAROXYSMAL SWEATS: *
AGITATION: NORMAL ACTIVITY
ORIENTATION AND CLOUDING OF SENSORIUM: ORIENTED AND CAN DO SERIAL ADDITIONS
TOTAL SCORE: 7
HEADACHE, FULLNESS IN HEAD: MILD
AUDITORY DISTURBANCES: NOT PRESENT
ANXIETY: *
HEADACHE, FULLNESS IN HEAD: MILD
PAROXYSMAL SWEATS: *
PAROXYSMAL SWEATS: BARELY PERCEPTIBLE SWEATING. PALMS MOIST
TOTAL SCORE: 2
ANXIETY: MILDLY ANXIOUS
TOTAL SCORE: 8
VISUAL DISTURBANCES: NOT PRESENT
AGITATION: NORMAL ACTIVITY
PAROXYSMAL SWEATS: NO SWEAT VISIBLE
ORIENTATION AND CLOUDING OF SENSORIUM: ORIENTED AND CAN DO SERIAL ADDITIONS
ANXIETY: MILDLY ANXIOUS
AUDITORY DISTURBANCES: NOT PRESENT
ANXIETY: *
ORIENTATION AND CLOUDING OF SENSORIUM: ORIENTED AND CAN DO SERIAL ADDITIONS
AGITATION: NORMAL ACTIVITY
ANXIETY: MILDLY ANXIOUS
TREMOR: TREMOR NOT VISIBLE BUT CAN BE FELT, FINGERTIP TO FINGERTIP
PAROXYSMAL SWEATS: BARELY PERCEPTIBLE SWEATING. PALMS MOIST
AUDITORY DISTURBANCES: NOT PRESENT

## 2019-06-20 ASSESSMENT — ENCOUNTER SYMPTOMS
ABDOMINAL PAIN: 0
HEADACHES: 0
DIZZINESS: 0
SHORTNESS OF BREATH: 0
FALLS: 0
LOSS OF CONSCIOUSNESS: 0
VOMITING: 0
HALLUCINATIONS: 0
FEVER: 0
BLOOD IN STOOL: 0
TREMORS: 1
PALPITATIONS: 0
DIARRHEA: 1
NAUSEA: 0
CHILLS: 0

## 2019-06-20 NOTE — CARE PLAN
Problem: Nutritional:  Goal: Achieve adequate nutritional intake  Patient will consume 50% of meals + 50% of supplements   Outcome: PROGRESSING SLOWER THAN EXPECTED  See RD note (RN believes pt did not receive supplements today)

## 2019-06-20 NOTE — PROGRESS NOTES
Received bedside report from RN, pt care assumed, VSS, pt assessment complete. Pt AOx4, no c/o  pain at this time. No signs of acute distress noted at this time. POC discussed with pt and verbalizes no questions. Pt denies any additional needs at this time. Bed in lowest position, bed alarm on, pt educated on fall risk and verbalized understanding, call light within reach, hourly rounding initiated. In a sinus rhythm.

## 2019-06-20 NOTE — DIETARY
Nutrition Services: Update   Day 1 of admit.  Cory Marmolejo is a 33 y.o. male with admitting DX of Hepatitis    Pt is currently on Hepatic diet with Boost Plus x 1/day, high protein MS x1 day, and magic cup x2/day to trial as noted pt would like to trial different supplements at this time. Per RN, states pt hasn't been finishing his meals, though is eating about half of trays. Didn't recall if pt was getting supplements. Pt continues to refuse cortrak. RN discussed that would continue to encourage to eat to avoid TF per pt wishes.     Will continue to monitor and may recommend TF pending PO intake adequacy.     Malnutrition Risk noted on 6/19: Patient with severe malnutrition in the context of chronic disease related and starvation related malnutrition r/t hx liver disease and poor PO intake over the last several months, as evidenced by severe muscle loss, severe weight loss of 17.3%, reduced PO intake to <10% of prior dietary regimen, pt reports.    Recommendations/Plan:  1. Encourage intake of meals / supplements  2. RD continue to monitor next 24 hours for potential need for nutrition support. Goal is for pt to orally consume 50% of meals or greater and 50% of supplements or greater.  3. Document intake of all meals as % taken in ADL's to provide interdisciplinary communication across all shifts.   4. Monitor weight.  5. Nutrition rep will continue to see patient for ongoing meal and snack preferences.    RD following
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
## 2019-06-21 PROBLEM — K76.82 HEPATIC ENCEPHALOPATHY (HCC): Status: RESOLVED | Noted: 2019-06-20 | Resolved: 2019-06-21

## 2019-06-21 LAB
ALBUMIN SERPL BCP-MCNC: 3.5 G/DL (ref 3.2–4.9)
ALBUMIN/GLOB SERPL: 1.4 G/DL
ALP SERPL-CCNC: 236 U/L (ref 30–99)
ALT SERPL-CCNC: 164 U/L (ref 2–50)
AMMONIA PLAS-SCNC: 67 UMOL/L (ref 11–45)
ANION GAP SERPL CALC-SCNC: 9 MMOL/L (ref 0–11.9)
AST SERPL-CCNC: 269 U/L (ref 12–45)
BILIRUB SERPL-MCNC: 8 MG/DL (ref 0.1–1.5)
BUN SERPL-MCNC: 12 MG/DL (ref 8–22)
CALCIUM SERPL-MCNC: 8.7 MG/DL (ref 8.5–10.5)
CHLORIDE SERPL-SCNC: 97 MMOL/L (ref 96–112)
CO2 SERPL-SCNC: 25 MMOL/L (ref 20–33)
CREAT SERPL-MCNC: 0.6 MG/DL (ref 0.5–1.4)
ERYTHROCYTE [DISTWIDTH] IN BLOOD BY AUTOMATED COUNT: 51.6 FL (ref 35.9–50)
GLOBULIN SER CALC-MCNC: 2.5 G/DL (ref 1.9–3.5)
GLUCOSE SERPL-MCNC: 108 MG/DL (ref 65–99)
HCT VFR BLD AUTO: 31.9 % (ref 42–52)
HGB BLD-MCNC: 10.6 G/DL (ref 14–18)
MAGNESIUM SERPL-MCNC: 1.8 MG/DL (ref 1.5–2.5)
MCH RBC QN AUTO: 33.2 PG (ref 27–33)
MCHC RBC AUTO-ENTMCNC: 33.2 G/DL (ref 33.7–35.3)
MCV RBC AUTO: 100 FL (ref 81.4–97.8)
PLATELET # BLD AUTO: 193 K/UL (ref 164–446)
PMV BLD AUTO: 11.1 FL (ref 9–12.9)
POTASSIUM SERPL-SCNC: 3.8 MMOL/L (ref 3.6–5.5)
PROT SERPL-MCNC: 6 G/DL (ref 6–8.2)
RBC # BLD AUTO: 3.19 M/UL (ref 4.7–6.1)
SODIUM SERPL-SCNC: 131 MMOL/L (ref 135–145)
WBC # BLD AUTO: 3.8 K/UL (ref 4.8–10.8)

## 2019-06-21 PROCEDURE — A9270 NON-COVERED ITEM OR SERVICE: HCPCS | Performed by: HOSPITALIST

## 2019-06-21 PROCEDURE — 770020 HCHG ROOM/CARE - TELE (206)

## 2019-06-21 PROCEDURE — 36415 COLL VENOUS BLD VENIPUNCTURE: CPT

## 2019-06-21 PROCEDURE — 80053 COMPREHEN METABOLIC PANEL: CPT

## 2019-06-21 PROCEDURE — 700102 HCHG RX REV CODE 250 W/ 637 OVERRIDE(OP): Performed by: HOSPITALIST

## 2019-06-21 PROCEDURE — 83735 ASSAY OF MAGNESIUM: CPT

## 2019-06-21 PROCEDURE — 99232 SBSQ HOSP IP/OBS MODERATE 35: CPT | Performed by: HOSPITALIST

## 2019-06-21 PROCEDURE — 700105 HCHG RX REV CODE 258: Performed by: HOSPITALIST

## 2019-06-21 PROCEDURE — 82140 ASSAY OF AMMONIA: CPT

## 2019-06-21 PROCEDURE — 85027 COMPLETE CBC AUTOMATED: CPT

## 2019-06-21 RX ORDER — SODIUM CHLORIDE 9 MG/ML
INJECTION, SOLUTION INTRAVENOUS CONTINUOUS
Status: DISCONTINUED | OUTPATIENT
Start: 2019-06-21 | End: 2019-06-22

## 2019-06-21 RX ADMIN — LEVETIRACETAM 500 MG: 500 TABLET ORAL at 18:01

## 2019-06-21 RX ADMIN — SODIUM CHLORIDE 1000 ML: 9 INJECTION, SOLUTION INTRAVENOUS at 12:45

## 2019-06-21 RX ADMIN — POTASSIUM CHLORIDE 40 MEQ: 1500 TABLET, EXTENDED RELEASE ORAL at 05:55

## 2019-06-21 RX ADMIN — LEVETIRACETAM 500 MG: 500 TABLET ORAL at 05:55

## 2019-06-21 RX ADMIN — Medication 100 MG: at 05:55

## 2019-06-21 RX ADMIN — LACTULOSE 30 ML: 20 SOLUTION ORAL at 18:04

## 2019-06-21 RX ADMIN — LACTULOSE 30 ML: 20 SOLUTION ORAL at 12:00

## 2019-06-21 RX ADMIN — FOLIC ACID 1 MG: 1 TABLET ORAL at 05:55

## 2019-06-21 RX ADMIN — LORAZEPAM 0.5 MG: 0.5 TABLET ORAL at 08:24

## 2019-06-21 RX ADMIN — THERA TABS 1 TABLET: TAB at 05:55

## 2019-06-21 RX ADMIN — LACTULOSE 30 ML: 20 SOLUTION ORAL at 05:54

## 2019-06-21 RX ADMIN — LORAZEPAM 1 MG: 1 TABLET ORAL at 05:54

## 2019-06-21 ASSESSMENT — LIFESTYLE VARIABLES
VISUAL DISTURBANCES: NOT PRESENT
PAROXYSMAL SWEATS: *
VISUAL DISTURBANCES: NOT PRESENT
AGITATION: NORMAL ACTIVITY
TOTAL SCORE: 4
TOTAL SCORE: 1
AUDITORY DISTURBANCES: NOT PRESENT
ORIENTATION AND CLOUDING OF SENSORIUM: ORIENTED AND CAN DO SERIAL ADDITIONS
ORIENTATION AND CLOUDING OF SENSORIUM: ORIENTED AND CAN DO SERIAL ADDITIONS
AUDITORY DISTURBANCES: NOT PRESENT
ANXIETY: NO ANXIETY (AT EASE)
TREMOR: TREMOR NOT VISIBLE BUT CAN BE FELT, FINGERTIP TO FINGERTIP
HEADACHE, FULLNESS IN HEAD: NOT PRESENT
TREMOR: NO TREMOR
AGITATION: NORMAL ACTIVITY
AUDITORY DISTURBANCES: NOT PRESENT
AGITATION: NORMAL ACTIVITY
TOTAL SCORE: 1
PAROXYSMAL SWEATS: BARELY PERCEPTIBLE SWEATING. PALMS MOIST
HEADACHE, FULLNESS IN HEAD: NOT PRESENT
HEADACHE, FULLNESS IN HEAD: VERY MILD
HEADACHE, FULLNESS IN HEAD: NOT PRESENT
VISUAL DISTURBANCES: NOT PRESENT
VISUAL DISTURBANCES: NOT PRESENT
PAROXYSMAL SWEATS: NO SWEAT VISIBLE
ORIENTATION AND CLOUDING OF SENSORIUM: ORIENTED AND CAN DO SERIAL ADDITIONS
AGITATION: NORMAL ACTIVITY
AUDITORY DISTURBANCES: NOT PRESENT
NAUSEA AND VOMITING: MILD NAUSEA WITH NO VOMITING
NAUSEA AND VOMITING: NO NAUSEA AND NO VOMITING
AGITATION: NORMAL ACTIVITY
ANXIETY: NO ANXIETY (AT EASE)
ANXIETY: MILDLY ANXIOUS
ORIENTATION AND CLOUDING OF SENSORIUM: ORIENTED AND CAN DO SERIAL ADDITIONS
ANXIETY: NO ANXIETY (AT EASE)
NAUSEA AND VOMITING: NO NAUSEA AND NO VOMITING
TOTAL SCORE: 5
NAUSEA AND VOMITING: MILD NAUSEA WITH NO VOMITING
HEADACHE, FULLNESS IN HEAD: VERY MILD
AUDITORY DISTURBANCES: NOT PRESENT
PAROXYSMAL SWEATS: BARELY PERCEPTIBLE SWEATING. PALMS MOIST
ANXIETY: NO ANXIETY (AT EASE)
VISUAL DISTURBANCES: NOT PRESENT
NAUSEA AND VOMITING: MILD NAUSEA WITH NO VOMITING
TOTAL SCORE: 4
SUBSTANCE_ABUSE: 1
TREMOR: TREMOR NOT VISIBLE BUT CAN BE FELT, FINGERTIP TO FINGERTIP
TREMOR: TREMOR NOT VISIBLE BUT CAN BE FELT, FINGERTIP TO FINGERTIP
VISUAL DISTURBANCES: NOT PRESENT
HEADACHE, FULLNESS IN HEAD: NOT PRESENT
TREMOR: TREMOR NOT VISIBLE BUT CAN BE FELT, FINGERTIP TO FINGERTIP
AUDITORY DISTURBANCES: NOT PRESENT
PAROXYSMAL SWEATS: BARELY PERCEPTIBLE SWEATING. PALMS MOIST
AGITATION: NORMAL ACTIVITY
ANXIETY: NO ANXIETY (AT EASE)
PAROXYSMAL SWEATS: BARELY PERCEPTIBLE SWEATING. PALMS MOIST
TOTAL SCORE: 2
ORIENTATION AND CLOUDING OF SENSORIUM: ORIENTED AND CAN DO SERIAL ADDITIONS
ORIENTATION AND CLOUDING OF SENSORIUM: ORIENTED AND CAN DO SERIAL ADDITIONS
NAUSEA AND VOMITING: MILD NAUSEA WITH NO VOMITING
TREMOR: NO TREMOR

## 2019-06-21 ASSESSMENT — ENCOUNTER SYMPTOMS
SEIZURES: 0
LOSS OF CONSCIOUSNESS: 0
SHORTNESS OF BREATH: 0
ABDOMINAL PAIN: 0
DIZZINESS: 0
TREMORS: 1
DIARRHEA: 1
BLOOD IN STOOL: 0
VOMITING: 0
HEADACHES: 0
PALPITATIONS: 0
WEAKNESS: 0
FALLS: 0
CHILLS: 0
MYALGIAS: 0
NAUSEA: 0
HALLUCINATIONS: 0
FEVER: 0

## 2019-06-21 NOTE — DIETARY
"Nutrition Services: Update   Day 2 of admit.  Cory Marmolejo is a 33 y.o. male with admitting DX of hepatitis.    Pt is currently on a hepatic diet and per chart, pt with variable PO intake ranging from 25%-100%.  Pt appears ill with a yellow-tinge.  RD visited pt at bedside to confirm how he is eating - pt reports he is trying to eat at least half of his meals or more.  Pt is also receiving milkshakes 1x/day, Magic Cups BID, and Boost Plus 1x/day.  Pt reports he drinks ~50% or greater of milkshakes and Magic Cups, but does not recall if he is receiving the Boost.  RD stressed the importance of adequate nutrition and the importance of nutritional supplements if he cannot eat enough of his meal trays - pt verbalized understanding.  Pt continues to be adamant about \"not wanting the tube.\"  We adjusted some of pt's snacks and supplements at this time.  Pt reports some nausea and requested a \"barf bag\" just in case - RD relayed to RN.  RD continues to monitor.      Malnutrition Risk per RD note from 6/19: Patient with severe malnutrition in the context of chronic disease related and starvation related malnutrition r/t hx liver disease and poor PO intake over the last several months, as evidenced by severe muscle loss, severe weight loss of 17.3%, reduced PO intake to <10% of prior dietary regimen, pt reports.      Recommendations/Plan:  1. Encourage intake of meals, snacks, and supplements.   2. Document intake of all meals, snacks, and supplements as % taken in ADL's to provide interdisciplinary communication across all shifts.   3. Monitor weight.  4. Nutrition rep will continue to see patient for ongoing meal and snack preferences.    RD following    "

## 2019-06-21 NOTE — CARE PLAN
Problem: Safety  Goal: Will remain free from injury  Outcome: PROGRESSING AS EXPECTED  Patient's risk for injury and falls assessed. Appropriate safety precautions in place. Patient educated to utilize call light for needs. Patient verbalizes understanding.      Problem: Knowledge Deficit  Goal: Knowledge of disease process/condition, treatment plan, diagnostic tests, and medications will improve  Outcome: PROGRESSING AS EXPECTED  Patient is educated of disease process and condition. Treatment team has included patient in plan of care. All medications indications and side effects are explained. Patient is encouraged to ask questions. Patient indicates understanding.

## 2019-06-21 NOTE — CARE PLAN
Problem: Nutritional:  Goal: Achieve adequate nutritional intake  Patient will consume 50% of meals + 50% of supplements   Outcome: PROGRESSING AS EXPECTED

## 2019-06-21 NOTE — ASSESSMENT & PLAN NOTE
Body mass index is 14.2 kg/m².  -Dietary consult  -Recommended cortrak, patient declining  -Vitamin supplementation
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
## 2019-06-22 ENCOUNTER — PATIENT OUTREACH (OUTPATIENT)
Dept: HEALTH INFORMATION MANAGEMENT | Facility: OTHER | Age: 34
End: 2019-06-22

## 2019-06-22 VITALS
RESPIRATION RATE: 18 BRPM | WEIGHT: 97.44 LBS | SYSTOLIC BLOOD PRESSURE: 136 MMHG | BODY MASS INDEX: 13.95 KG/M2 | DIASTOLIC BLOOD PRESSURE: 90 MMHG | OXYGEN SATURATION: 99 % | TEMPERATURE: 97.3 F | HEIGHT: 70 IN | HEART RATE: 62 BPM

## 2019-06-22 PROBLEM — F10.939 ALCOHOL WITHDRAWAL (HCC): Status: RESOLVED | Noted: 2019-06-19 | Resolved: 2019-06-22

## 2019-06-22 PROBLEM — K72.10 CHRONIC LIVER FAILURE (HCC): Status: ACTIVE | Noted: 2019-06-19

## 2019-06-22 PROBLEM — E87.6 HYPOKALEMIA: Status: RESOLVED | Noted: 2018-01-10 | Resolved: 2019-06-22

## 2019-06-22 LAB
ALBUMIN SERPL BCP-MCNC: 3.5 G/DL (ref 3.2–4.9)
ALBUMIN/GLOB SERPL: 1.5 G/DL
ALP SERPL-CCNC: 226 U/L (ref 30–99)
ALT SERPL-CCNC: 196 U/L (ref 2–50)
AMMONIA PLAS-SCNC: 81 UMOL/L (ref 11–45)
ANION GAP SERPL CALC-SCNC: 9 MMOL/L (ref 0–11.9)
AST SERPL-CCNC: 268 U/L (ref 12–45)
BILIRUB SERPL-MCNC: 7 MG/DL (ref 0.1–1.5)
BUN SERPL-MCNC: 12 MG/DL (ref 8–22)
CALCIUM SERPL-MCNC: 8.6 MG/DL (ref 8.5–10.5)
CHLORIDE SERPL-SCNC: 101 MMOL/L (ref 96–112)
CO2 SERPL-SCNC: 22 MMOL/L (ref 20–33)
CREAT SERPL-MCNC: 0.61 MG/DL (ref 0.5–1.4)
GLOBULIN SER CALC-MCNC: 2.4 G/DL (ref 1.9–3.5)
GLUCOSE SERPL-MCNC: 103 MG/DL (ref 65–99)
POTASSIUM SERPL-SCNC: 3.7 MMOL/L (ref 3.6–5.5)
PROT SERPL-MCNC: 5.9 G/DL (ref 6–8.2)
SODIUM SERPL-SCNC: 132 MMOL/L (ref 135–145)

## 2019-06-22 PROCEDURE — 36415 COLL VENOUS BLD VENIPUNCTURE: CPT

## 2019-06-22 PROCEDURE — A9270 NON-COVERED ITEM OR SERVICE: HCPCS | Performed by: HOSPITALIST

## 2019-06-22 PROCEDURE — 700102 HCHG RX REV CODE 250 W/ 637 OVERRIDE(OP): Performed by: HOSPITALIST

## 2019-06-22 PROCEDURE — 700105 HCHG RX REV CODE 258: Performed by: HOSPITALIST

## 2019-06-22 PROCEDURE — 80053 COMPREHEN METABOLIC PANEL: CPT

## 2019-06-22 PROCEDURE — 99239 HOSP IP/OBS DSCHRG MGMT >30: CPT | Performed by: HOSPITALIST

## 2019-06-22 PROCEDURE — 82140 ASSAY OF AMMONIA: CPT

## 2019-06-22 RX ORDER — FOLIC ACID 1 MG/1
1 TABLET ORAL DAILY
Qty: 30 TAB | Refills: 0 | Status: SHIPPED | OUTPATIENT
Start: 2019-06-23 | End: 2023-05-07

## 2019-06-22 RX ORDER — POTASSIUM CHLORIDE 20 MEQ/1
20 TABLET, EXTENDED RELEASE ORAL DAILY
Qty: 60 TAB | Refills: 11 | Status: SHIPPED | OUTPATIENT
Start: 2019-06-23 | End: 2023-05-07

## 2019-06-22 RX ORDER — LANOLIN ALCOHOL/MO/W.PET/CERES
100 CREAM (GRAM) TOPICAL DAILY
Qty: 30 TAB | Refills: 0 | Status: SHIPPED | OUTPATIENT
Start: 2019-06-23 | End: 2023-05-07

## 2019-06-22 RX ORDER — LACTULOSE 20 G/30ML
30 SOLUTION ORAL 2 TIMES DAILY
Qty: 60 EACH | Refills: 0 | Status: SHIPPED | OUTPATIENT
Start: 2019-06-22 | End: 2019-07-22

## 2019-06-22 RX ADMIN — FOLIC ACID 1 MG: 1 TABLET ORAL at 05:13

## 2019-06-22 RX ADMIN — LEVETIRACETAM 500 MG: 500 TABLET ORAL at 05:13

## 2019-06-22 RX ADMIN — SODIUM CHLORIDE: 9 INJECTION, SOLUTION INTRAVENOUS at 00:56

## 2019-06-22 RX ADMIN — THERA TABS 1 TABLET: TAB at 05:12

## 2019-06-22 RX ADMIN — Medication 100 MG: at 05:13

## 2019-06-22 RX ADMIN — LACTULOSE 30 ML: 20 SOLUTION ORAL at 05:12

## 2019-06-22 RX ADMIN — POTASSIUM CHLORIDE 40 MEQ: 1500 TABLET, EXTENDED RELEASE ORAL at 05:12

## 2019-06-22 ASSESSMENT — LIFESTYLE VARIABLES
TREMOR: NO TREMOR
AUDITORY DISTURBANCES: NOT PRESENT
ANXIETY: NO ANXIETY (AT EASE)
VISUAL DISTURBANCES: NOT PRESENT
AUDITORY DISTURBANCES: NOT PRESENT
NAUSEA AND VOMITING: NO NAUSEA AND NO VOMITING
TOTAL SCORE: 1
VISUAL DISTURBANCES: NOT PRESENT
AGITATION: NORMAL ACTIVITY
AGITATION: NORMAL ACTIVITY
TOTAL SCORE: 1
TREMOR: NO TREMOR
ORIENTATION AND CLOUDING OF SENSORIUM: ORIENTED AND CAN DO SERIAL ADDITIONS
ORIENTATION AND CLOUDING OF SENSORIUM: ORIENTED AND CAN DO SERIAL ADDITIONS
AGITATION: NORMAL ACTIVITY
TREMOR: NO TREMOR
VISUAL DISTURBANCES: NOT PRESENT
PAROXYSMAL SWEATS: BARELY PERCEPTIBLE SWEATING. PALMS MOIST
HEADACHE, FULLNESS IN HEAD: VERY MILD
ORIENTATION AND CLOUDING OF SENSORIUM: ORIENTED AND CAN DO SERIAL ADDITIONS
NAUSEA AND VOMITING: NO NAUSEA AND NO VOMITING
PAROXYSMAL SWEATS: BARELY PERCEPTIBLE SWEATING. PALMS MOIST
ANXIETY: NO ANXIETY (AT EASE)
AUDITORY DISTURBANCES: NOT PRESENT
HEADACHE, FULLNESS IN HEAD: NOT PRESENT
TOTAL SCORE: 2
NAUSEA AND VOMITING: NO NAUSEA AND NO VOMITING
HEADACHE, FULLNESS IN HEAD: NOT PRESENT
ANXIETY: NO ANXIETY (AT EASE)
PAROXYSMAL SWEATS: BARELY PERCEPTIBLE SWEATING. PALMS MOIST

## 2019-06-22 NOTE — DISCHARGE SUMMARY
Discharge Summary    CHIEF COMPLAINT ON ADMISSION  Chief Complaint   Patient presents with   • ETOH Withdrawal   • Abnormal Labs   • N/V       Reason for Admission  ems     Admission Date  6/19/2019    CODE STATUS  Full Code    HPI & HOSPITAL COURSE  This is a 33 y.o. male with alcohol abuse here with ETOH withdrawal, N/V. He was transferred from an outside facility for liver failure and alcohol withdrawal, reported that his last drink was the day prior to presentation. Felt tremulous, anxious, and noticed worsening jaundice. On evaluation, WBC 4.2, Hemoglobin 10.7, plt 179, Na 137, K 2.3, BUN/Cr 19/0.76. , , Alk phos 232, T bili 12.7, ammonia 88, INR 1.11. He was started on IVF and lactulose. No RUQ tenderness but given his lab findings a RUQ US was performed, no evidence of cholecystis or ductal dilation. Liver was noted to be fatty vs fibrotic. CIWA protocol was initiated, electrolytes were repleted and dietary was consulted. Cortrak was recommended for additional enteral nutrition but the patient declined. His N/V resolved and he was able to tolerate adequate PO intake and T bili trended down to 7 by the time of discharge. He felt improved and mentation cleared. It was stressed how important follow up would be and to be evaluated by a GI physician. Since there was no worsening of his liver failure this can be done outpatient. He was eager for discharge home and expressed understanding of the importance of alcohol cessation although his longstanding history of alcohol abuse will make this very difficult for him.        Therefore, he is discharged in good and stable condition to home with close outpatient follow-up.    The patient met 2-midnight criteria for an inpatient stay at the time of discharge.    Discharge Date  6/22/2019    FOLLOW UP ITEMS POST DISCHARGE  Please establish with a PCP and obtain outpatient GI evaluation for your liver disease.     DISCHARGE DIAGNOSES  Principal Problem:     Chronic liver failure (HCC) POA: Yes  Active Problems:    Seizure disorder (HCC) POA: Yes    Severe protein-calorie malnutrition (HCC) POA: Yes  Resolved Problems:    Hypokalemia POA: Yes    Alcohol withdrawal (HCC) POA: Yes    Increased anion gap metabolic acidosis POA: Yes    Hepatic encephalopathy (HCC) POA: Yes      FOLLOW UP  [address] [id number] [phone]    PLEASE CALL OR WALK IN TO ESTABLISH A NEW PCP APPOINTMENT. THANK YOU    [address] [id number] [phone]  Schedule an appointment as soon as possible for a visit in 1 week      Pcp Pt States None            MEDICATIONS ON DISCHARGE     Medication List      START taking these medications      Instructions   folic acid 1 MG Tabs  Commonly known as:  FOLVITE   Take 1 Tab by mouth every day.  Dose:  1 mg     lactulose 20 GM/30ML Soln   Doctor's comments:  Hold if greater than 2-3 BMs daily  Take 30 mL by mouth 2 Times a Day for 30 days.  Dose:  30 mL     potassium chloride SA 20 MEQ Tbcr  Commonly known as:  Kdur   Take 1 Tab by mouth every day.  Dose:  20 mEq     thiamine 100 MG tablet  Commonly known as:  THIAMINE   Take 1 Tab by mouth every day.  Dose:  100 mg        CONTINUE taking these medications      Instructions   levETIRAcetam 500 MG Tabs  Commonly known as:  KEPPRA   Take 1 Tab by mouth 2 Times a Day.  Dose:  500 mg            Allergies  Allergies   Allergen Reactions   • Tylenol Nausea       DIET  Orders Placed This Encounter   Procedures   • Diet Order Hepatic     Standing Status:   Standing     Number of Occurrences:   1     Order Specific Question:   Diet:     Answer:   Hepatic [9]       ACTIVITY  As tolerated.  Weight bearing as tolerated    CONSULTATIONS  None    PROCEDURES  US-RUQ   Final Result         1.  Hepatomegaly and echogenic liver, compatible with fatty change versus fibrosis.   2.  Small amount of gallbladder sludge  without additional sonographic findings of cholecystitis.            LABORATORY  Lab Results   Component Value Date    SODIUM 132 (L) 06/22/2019    POTASSIUM 3.7 06/22/2019    CHLORIDE 101 06/22/2019    CO2 22 06/22/2019    GLUCOSE 103 (H) 06/22/2019    BUN 12 06/22/2019    CREATININE 0.61 06/22/2019    CREATININE 0.9 10/24/2005        Lab Results   Component Value Date    WBC 3.8 (L) 06/21/2019    HEMOGLOBIN 10.6 (L) 06/21/2019    HEMATOCRIT 31.9 (L) 06/21/2019    PLATELETCT 193 06/21/2019        Total time of the discharge process exceeds 39 minutes.

## 2019-06-22 NOTE — PROGRESS NOTES
Pt discharged via ambulation. Pt stated his ride will pick him up downstairs.Discharge instructions given and explained to pt. All questions and concerns answered at this time.

## 2019-06-22 NOTE — DISCHARGE INSTRUCTIONS
Discharge Instructions    Discharged to home by car with self. Discharged via wheelchair, hospital escort: Yes.  Special equipment needed: Not Applicable    Be sure to schedule a follow-up appointment with your primary care doctor or any specialists as instructed.     Discharge Plan:   Smoking Cessation Offered: Patient Refused  Pneumococcal Vaccine Administered/Refused: Not given - Patient refused pneumococcal vaccine  Influenza Vaccine Indication: Indicated: Not available from distributor/    I understand that a diet low in cholesterol, fat, and sodium is recommended for good health. Unless I have been given specific instructions below for another diet, I accept this instruction as my diet prescription.   Other diet: Hepatic    Special Instructions: None    · Is patient discharged on Warfarin / Coumadin?   No     Depression / Suicide Risk    As you are discharged from this [facility] facility, it is important to learn how to keep safe from harming yourself.    Recognize the warning signs:  · Abrupt changes in personality, positive or negative- including increase in energy   · Giving away possessions  · Change in eating patterns- significant weight changes-  positive or negative  · Change in sleeping patterns- unable to sleep or sleeping all the time   · Unwillingness or inability to communicate  · Depression  · Unusual sadness, discouragement and loneliness  · Talk of wanting to die  · Neglect of personal appearance   · Rebelliousness- reckless behavior  · Withdrawal from people/activities they love  · Confusion- inability to concentrate     If you or a loved one observes any of these behaviors or has concerns about self-harm, here's what you can do:  · Talk about it- your feelings and reasons for harming yourself  · Remove any means that you might use to hurt yourself (examples: pills, rope, extension cords, firearm)  · Get professional help from the community (Mental Health, Substance Abuse,  psychological counseling)  · Do not be alone:Call your Safe Contact- someone whom you trust who will be there for you.  · Call your local CRISIS HOTLINE 130-7421 or 623-753-1623  · Call your local Children's Mobile Crisis Response Team [place] (969) 471-8405 or www.[employer]  · Call the toll free National Suicide Prevention Hotlines   · National Suicide Prevention Lifeline 262-926-UBVY (6354)  · National Hope Line Network 800-SUICIDE (956-4976)      Liver Failure  Introduction  Liver failure is a condition in which the liver loses its ability to function due to injury or disease. The liver is a large organ in the upper right-hand side of the abdomen. It is involved in many important functions, including storing energy, producing fluids that the body needs, and removing harmful substances from the bloodstream.  Liver failure can develop quickly, over days or weeks (acute liver failure). It can also develop gradually, over months or years (chronic liver failure).  What are the causes?  There are many possible causes of liver failure, including:  · Liver infection (viral hepatitis).  · Alcohol abuse.  · An overdose of certain medicines. Acetaminophen overdose is a common cause.  · Liver disease.  · Ingesting poison from mushrooms or mold.  What are the signs or symptoms?  Symptoms of this condition may include:  · Yellowing of the skin and the whites of the eyes (jaundice).  · Bruising easily.  · Persistent bleeding.  · Itchy skin (pruritus).  · Red, spider-like lines on the skin.  · Loss of appetite.  · Nausea.  · Vomiting blood.  · Bloody bowel movements.  · Weight loss.  · Muscle loss.  · Jerky or floppy muscle movements, especially with hand movements (asterixis).  · Fluid buildup in the belly (ascites).  · Decreased urination. This may be a sign that your kidneys have stopped working (renal failure).  · Confusion and sleepiness.  · Difficulty sleeping.  · Mood and personality changes.  · Frequent  infections.  · Breath that smells sweet or musty.  · Difficulty breathing.  How is this diagnosed?  This condition is diagnosed based on your symptoms, your medical history, and a physical exam. You may have tests, including:  · Blood tests.  · CT scan.  · MRI.  · Ultrasound.  · Removal of a small amount of liver tissue to be examined under a microscope (biopsy).  How is this treated?  Treatment depends on the cause and severity of your condition.  Treatment for chronic liver failure may include:  · Medicines to help reduce symptoms.  · Lifestyle changes, such as limiting salt and animal proteins in your diet. Foods that contain animal proteins include red meat, fish, and dairy products.  Acute or advanced (end stage) liver failure may require hospitalization. Treatment may include:  · Antibiotic medicine.  · IV fluids that contain sugar (glucose) and minerals (electrolytes).  · Flushing out toxic substances from the body using medicine (lactulose) or a cleansing procedure (enema).  · Adding certain amounts of the liquid part of blood (plasma) to your bloodstream (receiving a transfusion).  · Using an artificial kidney to filter your blood (hemodialysis) if you have renal failure.  · Breathing support and a breathing tube (respirator).  · Liver transplant. This is a surgery to replace your liver with another person’s liver (donor liver). This may be the best option if your liver has completely stopped functioning.  Follow these instructions at home:  · Take over-the-counter and prescription medicines only as told by your health care provider.  · Do not drink alcohol.  · Do not use tobacco products, including cigarettes, chewing tobacco, or e-cigarettes. If you need help quitting, ask your health care provider.  · Follow instructions from your health care provider about eating and drinking restrictions. This may include:  ¨ Limiting the amount of animal protein that you eat.  ¨ Increasing the amount of plant-based  protein that you eat. Foods that contain plant-based proteins include whole grains, nuts, and vegetables.  ¨ Taking vitamin supplements.  ¨ Limiting the amount of salt that you eat.  · Follow instructions from your health care provider about maintaining your vaccinations, especially vaccinations against hepatitis A and B.  · Exercise regularly, as told by your health care provider.  · Keep all follow-up visits as told by your health care provider. This is important.  Contact a health care provider if:  · You have symptoms that get worse.  · You lose a lot of weight without trying.  · You have a fever or chills.  Get help right away if:  · You become confused or very sleepy.  · You cannot take care of yourself or be taken care of at home.  · You are not urinating.  · You have difficulty breathing.  · You vomit blood.  This information is not intended to replace advice given to you by your health care provider. Make sure you discuss any questions you have with your health care provider.  Document Released: 09/07/2016 Document Revised: 05/25/2017 Document Reviewed: 01/06/2016  © 2017 Elsevier  Lactulose oral solution  What is this medicine?  LACTULOSE (LAK tyoo lose) is a laxative derived from lactose. It helps to treat chronic constipation and to treat or prevent hepatic encephalopathy or coma. These are brain disorders that result from liver disease.  This medicine may be used for other purposes; ask your health care provider or pharmacist if you have questions.  COMMON BRAND NAME(S): Acilac, Cephulac, Cholac, Chronulac, Constilac, Constulose, Enulose, Generlac  What should I tell my health care provider before I take this medicine?  They need to know if you have any of these conditions:  -need a galactose-free diet  -scheduled for surgery  -an unusual or allergic reaction to lactulose, other sugars, medicines, foods, dyes or preservatives  -pregnant or trying to get pregnant  -breast-feeding  How should I use this  medicine?  Take this medicine mouth. Follow the directions on the prescription label. Shake well before using. Use a specially marked spoon or container to measure your medicine. Ask your pharmacist if you do not have one. Household spoons are not accurate. Take your doses at regular intervals. Do not take your medicine more often than directed.  You may be directed to take this medicine rectally. If so, you must follow specific directions from your doctor or healthcare professional. Please contact him or her.  Talk to your pediatrician regarding the use of this medicine in children. Special care may be needed.  Overdosage: If you think you have taken too much of this medicine contact a poison control center or emergency room at once.  NOTE: This medicine is only for you. Do not share this medicine with others.  What if I miss a dose?  If you miss a dose, take it as soon as you can. If it is almost time for your next dose, take only that dose. Do not take double or extra doses.  What may interact with this medicine?  -antacids  -neomycin  -other laxatives  This list may not describe all possible interactions. Give your health care provider a list of all the medicines, herbs, non-prescription drugs, or dietary supplements you use. Also tell them if you smoke, drink alcohol, or use illegal drugs. Some items may interact with your medicine.  What should I watch for while using this medicine?  This medicine may not produce any result for 24 to 48 hours. Do not take this medicine for longer than directed by your doctor or health care professional.  Drink plenty of water with each dose of this medicine.  What side effects may I notice from receiving this medicine?  Side effects that you should report to your doctor or health care professional as soon as possible:  -diarrhea  Side effects that usually do not require medical attention (report to your doctor or health care professional if they continue or are  bothersome):  -belching, flatulence  -nausea or vomiting  -stomach pain or discomfort  This list may not describe all possible side effects. Call your doctor for medical advice about side effects. You may report side effects to FDA at 1-398-FDA-0814.  Where should I keep my medicine?  Keep out of the reach of children.  This medicine may darken in color under normal storage conditions. This is because of the sugar solution and does not affect the way the medicine works. If the solution becomes extremely dark in color, contact your health care professional before use.  Store at room temperature between 15 and 30 degrees C (59 and 86 degrees F). Do not freeze. Keep container tightly closed. Throw away any unused medicine after the expiration date.  NOTE: This sheet is a summary. It may not cover all possible information. If you have questions about this medicine, talk to your doctor, pharmacist, or health care provider.  © 2018 Elsevier/Gold Standard (2009-06-23 16:04:57)

## 2019-06-22 NOTE — CARE PLAN
Problem: Safety  Goal: Will remain free from injury  Outcome: PROGRESSING AS EXPECTED  Patient's risk for injury and falls assessed. Appropriate safety precautions in place. Patient educated to utilize call light for needs. Patient verbalizes understanding.      Problem: Knowledge Deficit  Goal: Knowledge of the prescribed therapeutic regimen will improve  Outcome: PROGRESSING AS EXPECTED  Patient is educated of disease process and condition. Treatment team has included patient in plan of care. All medications indications and side effects are explained. Patient is encouraged to ask questions. Patient indicates understanding.

## 2019-10-16 ENCOUNTER — NON-PROVIDER VISIT (OUTPATIENT)
Dept: URGENT CARE | Facility: PHYSICIAN GROUP | Age: 34
End: 2019-10-16

## 2019-10-16 DIAGNOSIS — Z02.1 PRE-EMPLOYMENT DRUG SCREENING: ICD-10-CM

## 2019-10-16 PROCEDURE — 7503 PR ESCREEN ACCT UDS COL ONLY: Performed by: PHYSICIAN ASSISTANT

## 2020-11-17 ENCOUNTER — HOSPITAL ENCOUNTER (OUTPATIENT)
Facility: MEDICAL CENTER | Age: 35
End: 2020-11-17
Attending: PHYSICIAN ASSISTANT
Payer: MEDICAID

## 2020-11-17 ENCOUNTER — OFFICE VISIT (OUTPATIENT)
Dept: URGENT CARE | Facility: PHYSICIAN GROUP | Age: 35
End: 2020-11-17
Payer: MEDICAID

## 2020-11-17 VITALS
TEMPERATURE: 97.7 F | WEIGHT: 123 LBS | BODY MASS INDEX: 17.61 KG/M2 | HEART RATE: 132 BPM | DIASTOLIC BLOOD PRESSURE: 92 MMHG | SYSTOLIC BLOOD PRESSURE: 144 MMHG | HEIGHT: 70 IN | RESPIRATION RATE: 16 BRPM | OXYGEN SATURATION: 94 %

## 2020-11-17 DIAGNOSIS — R06.02 SOB (SHORTNESS OF BREATH): ICD-10-CM

## 2020-11-17 DIAGNOSIS — R68.89 FLU-LIKE SYMPTOMS: ICD-10-CM

## 2020-11-17 PROCEDURE — U0003 INFECTIOUS AGENT DETECTION BY NUCLEIC ACID (DNA OR RNA); SEVERE ACUTE RESPIRATORY SYNDROME CORONAVIRUS 2 (SARS-COV-2) (CORONAVIRUS DISEASE [COVID-19]), AMPLIFIED PROBE TECHNIQUE, MAKING USE OF HIGH THROUGHPUT TECHNOLOGIES AS DESCRIBED BY CMS-2020-01-R: HCPCS

## 2020-11-17 PROCEDURE — 99204 OFFICE O/P NEW MOD 45 MIN: CPT | Performed by: PHYSICIAN ASSISTANT

## 2020-11-17 ASSESSMENT — FIBROSIS 4 INDEX: FIB4 SCORE: 3.37

## 2020-11-17 NOTE — PROGRESS NOTES
Chief Complaint   Patient presents with   • Shortness of Breath   • Emesis       HISTORY OF PRESENT ILLNESS: Patient is a 35 y.o. male who presents today for the following:    Cough x 2 days  + chills, SOB, N/V  Chest pain with coughing  Denies fever, body aches  OTC meds today: none  Known COVID exposure  + smokes    Patient Active Problem List    Diagnosis Date Noted   • Chronic liver failure (HCC) 06/19/2019     Priority: High   • Seizure disorder (HCC) 01/09/2018     Priority: High   • Transaminitis 01/09/2018     Priority: Medium   • Hyponatremia 01/09/2018     Priority: Low   • Severe protein-calorie malnutrition (HCC) 06/20/2019   • Marijuana use 01/09/2018       Allergies:Tylenol    Current Outpatient Medications Ordered in Epic   Medication Sig Dispense Refill   • potassium chloride SA (KDUR) 20 MEQ Tab CR Take 1 Tab by mouth every day. (Patient not taking: Reported on 11/17/2020) 60 Tab 11   • thiamine (THIAMINE) 100 MG tablet Take 1 Tab by mouth every day. (Patient not taking: Reported on 11/17/2020) 30 Tab 0   • folic acid (FOLVITE) 1 MG Tab Take 1 Tab by mouth every day. (Patient not taking: Reported on 11/17/2020) 30 Tab 0   • levetiracetam (KEPPRA) 500 MG Tab Take 1 Tab by mouth 2 Times a Day. (Patient not taking: Reported on 11/17/2020) 90 Tab 1     No current Epic-ordered facility-administered medications on file.        Past Medical History:   Diagnosis Date   • Alcohol dependence (HCC)        Social History     Tobacco Use   • Smoking status: Never Smoker   • Smokeless tobacco: Never Used   Substance Use Topics   • Alcohol use: Yes     Comment: 1/3 of a half gallon a day of vodka   • Drug use: Yes     Types: Marijuana, Inhaled     Comment: THC       Family Status   Relation Name Status   • Mo  Alive   • Fa  Alive   History reviewed. No pertinent family history.    Review of Systems:    Constitutional ROS: No unexpected change in weight, No weakness, No fatigue  Eye ROS: No recent significant  "change in vision, No eye pain, redness, discharge  Ear ROS: No drainage, No tinnitus or vertigo, No recent change in hearing  Mouth/Throat ROS: No teeth or gum problems, No bleeding gums, No tongue complaints  Neck ROS: No swollen glands, No significant pain in neck  Pulmonary ROS: + SOB  Cardiovascular ROS: No diaphoresis, No edema, No palpitations  Musculoskeletal/Extremities ROS: No peripheral edema, No pain, redness or swelling on the joints  Hematologic/Lymphatic ROS: + chills  Skin/Integumentary ROS: No edema, No evidence of rash, No itching      Exam:  /92 (BP Location: Right arm, Patient Position: Sitting, BP Cuff Size: Small adult)   Pulse (!) 132   Temp 36.5 °C (97.7 °F) (Temporal)   Resp 16   Ht 1.778 m (5' 10\")   Wt 55.8 kg (123 lb)   SpO2 94%   General: Well developed, well nourished. No distress.    Eye: PERRL/EOMI; conjunctivae clear, lids normal.  ENMT: Lips without lesions, MMM. Oropharynx is clear. Bilateral TMs are within normal limits.  Pulmonary: Unlabored respiratory effort. Lungs clear to auscultation, no wheezes, no rhonchi.    Cardiovascular: Tachycardia without murmur.   Neurologic: Grossly nonfocal. No facial asymmetry noted.  Lymph: No cervical lymphadenopathy noted.  Skin: Warm, dry, good turgor. No rashes in visible areas.   Psych: Normal mood. Alert and oriented to person, place and time.    Assessment/Plan:  Patient is tachycardic with a lower than expected oxygenation but still within normal limits.  Recommend chest x-ray if symptoms persist, ER if they worsen.  Patient will be tested for COVID and has been advised to quarantine until results are available. Encouraged patient to sign up for [employer]. Sign up information was sent via text message.  Discussed appropriate over-the-counter symptomatic medication, and when to return to clinic. Follow up for worsening or persistent symptoms.  1. Flu-like symptoms  COVID/SARS COV-2 PCR   2. SOB (shortness of breath)  DX-CHEST-2 " VIEWS

## 2020-11-17 NOTE — LETTER
November 17, 2020         Patient: Cory Marmolejo   YOB: 1985   Date of Visit: 11/17/2020           To Whom it May Concern:    Cory Marmolejo was seen in my clinic on 11/17/2020.     Please excuse patient for missing school/work until COVID results are available, typically taking 1-3 days.  They have been advised to quarantine during this time.      If you have any questions or concerns, please don't hesitate to call.        Sincerely,           [facility]  Electronically Signed

## 2020-11-19 DIAGNOSIS — R68.89 FLU-LIKE SYMPTOMS: ICD-10-CM

## 2020-11-19 LAB
COVID ORDER STATUS COVID19: NORMAL
SARS-COV-2 RNA RESP QL NAA+PROBE: NOTDETECTED
SPECIMEN SOURCE: NORMAL

## 2021-03-10 ENCOUNTER — OCCUPATIONAL MEDICINE (OUTPATIENT)
Dept: URGENT CARE | Facility: PHYSICIAN GROUP | Age: 36
End: 2021-03-10
Payer: COMMERCIAL

## 2021-03-10 VITALS
WEIGHT: 130 LBS | DIASTOLIC BLOOD PRESSURE: 68 MMHG | RESPIRATION RATE: 16 BRPM | HEART RATE: 108 BPM | SYSTOLIC BLOOD PRESSURE: 112 MMHG | TEMPERATURE: 99.4 F | HEIGHT: 70 IN | BODY MASS INDEX: 18.61 KG/M2 | OXYGEN SATURATION: 96 %

## 2021-03-10 DIAGNOSIS — S61.012A LACERATION OF LEFT THUMB WITHOUT FOREIGN BODY WITHOUT DAMAGE TO NAIL, INITIAL ENCOUNTER: ICD-10-CM

## 2021-03-10 PROCEDURE — 12002 RPR S/N/AX/GEN/TRNK2.6-7.5CM: CPT | Performed by: PHYSICIAN ASSISTANT

## 2021-03-10 ASSESSMENT — FIBROSIS 4 INDEX: FIB4 SCORE: 3.37

## 2021-03-10 NOTE — LETTER
[facility]  [address] [place] [address] [place] [address]  Phone:  [phone] - Fax:  [phone]   [facility] Progress Report and Disability Certification  Date of Service: 3/10/2021   No Show:  No  Date / Time of Next Visit: 3/17/2021   Claim Information   Patient Name: Cory Marmolejo  Claim Number:     Employer: [employer]  Date of Injury: 3/10/2021     Insurer / TPA: [employer]  ID / SSN:     Occupation: MAINTENANCE  Diagnosis: The encounter diagnosis was Laceration of left thumb without foreign body without damage to nail, initial encounter.    Medical Information   Related to Industrial Injury? Yes    Subjective Complaints:  Left thumb laceration   Objective Findings: 3 cm linear laceration located over the interphalangeal joint of the left thumb on the radial aspect.  Good distal range of motion, circulation and sensation     Pre-Existing Condition(s):     Assessment:   Initial Visit    Status: Additional Care Required  Comments:Follow-up in 1 week  Permanent Disability:No    Plan:      Diagnostics:      Comments:       Disability Information   Status: Released to Restricted Duty    From:  3/10/2021  Through: 3/17/2021 Restrictions are: Temporary   Physical Restrictions   Sitting:    Standing:    Stooping:    Bending:      Squatting:    Walking:    Climbing:    Pushing:      Pulling:    Other:    Reaching Above Shoulder (L):   Reaching Above Shoulder (R):       Reaching Below Shoulder (L):    Reaching Below Shoulder (R):      Not to exceed Weight Limits   Carrying(hrs):   Weight Limit(lb):   Lifting(hrs):   Weight  Limit(lb):     Comments: No lifting, pushing, pulling more than 5 pounds with left hand.  No repetitive gripping or squeezing    Repetitive Actions   Hands: i.e. Fine Manipulations from Grasping:     Feet: i.e. Operating Foot Controls:     Driving / Operate Machinery:     Provider Name:   Jerman Smith P.A.-C. Physician Signature:   Physician Name:     Clinic Name / Location: [facility]  [address] [place] [address] Clinic Phone Number: Dept: [phone]   Appointment Time: 4:10 Pm Visit Start Time: 4:17 PM   Check-In Time:  4:14 Pm Visit Discharge Time:  4:53 PM   Original-Treating Physician or Chiropractor    Page 2-Insurer/TPA    Page 3-Employer    Page 4-Employee

## 2021-03-10 NOTE — LETTER
"EMPLOYEE’S CLAIM FOR COMPENSATION/ REPORT OF INITIAL TREATMENT  FORM C-4    EMPLOYEE’S CLAIM - PROVIDE ALL INFORMATION REQUESTED   First Name  Cory Last Name  Jaleel Birthdate                    1985                Sex  male Claim Number   Home Address  [address] Age  35 y.o. 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 (if applicable)  SCRAPING DECALS FROM A REGISTER     How did this injury or occupational disease occur? (Be specific an answer in detail. Use additional sheet if necessary)  CUT MY LEFT THUMB WITH A KNIFE    If you believe that you have an occupational disease, when did you first have knowledge of the disability and it relationship to your employment?  NA  Witnesses to the Accident  NA       Nature of Injury or Occupational Disease  Laceration  Part(s) of Body Injured or Affected  Thumb (L), ,     I certify that the above is true and correct to the best of my knowledge and that I have provided this information in order to obtain the benefits of [place]  I hereby authorize any physician, chiropractor, surgeon, practitioner, or other person, any hospital, including [facility] or [facility], any medical service organization, any insurance company, or other institution or organization to release to each other, any medical or other information, including benefits paid or payable, pertinent to this injury or disease, except information relative to diagnosis, treatment and/or counseling for AIDS, psychological conditions, alcohol or controlled substances, for which I must give specific authorization.  A Photostat of this authorization shall be as valid as the original.     Date 03/10/2021   [facility]   Employee’s Signature   THIS REPORT MUST BE COMPLETED AND MAILED WITHIN 3 WORKING DAYS OF TREATMENT   [facility]  Name of [facility]   Date  3/10/2021 Diagnosis  (S61.012A) Laceration of left thumb without foreign body without damage to nail, initial encounter Is there evidence the injured employee was under the              influence of alcohol and/or another controlled substance at the time of accident?   Hour  4:17 PM Description of Injury or Disease  The encounter diagnosis was Laceration of left thumb without foreign body without damage to nail, initial encounter. No   Treatment  Suture repair of laceration  Have you advised the patient to remain off work five days or     more? No   X-Ray Findings      If Yes   From Date  To Date      From information given by the employee, together with medical evidence, can you directly connect this injury or occupational disease as job incurred?  Yes If No Full Duty    No Modified Duty  Yes   Is additional medical care by a physician indicated?  Yes  Comments:Follow-up in 1 week If Modified Duty, Specify any Limitations / Restrictions  No lifting, pushing, pulling more than 5 pounds with left hand   Do you  "know of any previous injury or disease contributing to this condition or occupational disease?                            No   Date  3/10/2021 Print Doctor’s Name   Herb Smith P.A.-C. I certify the employer’s copy of  this form was mailed on:   Address  [address] Ins[address]’s Use Only     [facility] Zip  [address]    Provider’s Tax ID Number  [id number] Telephone  Dept: [phone]      e-HERB Sun P.A.-C.  Signature:     Degree          ORIGINAL-TREATING PHYSICIAN OR CHIROPRACTOR    PAGE 2-INSURER/TPA    PAGE 3-EMPLOYER    PAGE 4-EMPLOYEE        Form C-4 (rev.10/07)           BRIEF DESCRIPTION OF RIGHTS AND BENEFITS  (Pursuant to [place] 616C.050)    Notice of Injury or Occupational Disease (Incident Report Form C-1): If an injury or occupational disease (OD) arises out of and in the course of employment, you must provide written notice to your employer as soon as practicable, but no later than 7 days after the accident or OD. Your employer shall maintain a sufficient supply of the required forms.    Claim for Compensation (Form C-4): If medical treatment is sought, the form C-4 is available at the place of initial treatment. A completed \"Claim for Compensation\" (Form C-4) must be filed within 90 days after an accident or OD. The treating physician or chiropractor must, within 3 working days after treatment, complete and mail to the employer, the employer's insurer and third-party , the Claim for Compensation.    Medical Treatment: If you require medical treatment for your on-the-job injury or OD, you may be required to select a physician or chiropractor from a list provided by your workers’ compensation insurer, if it has contracted with an Organization for Managed Care (MCO) or Preferred Provider Organization (PPO) or providers of health care. If your employer has not entered into a contract with an MCO or PPO, you may select a physician or chiropractor from the Panel of " Physicians and Chiropractors. Any medical costs related to your industrial injury or OD will be paid by your insurer.    Temporary Total Disability (TTD): If your doctor has certified that you are unable to work for a period of at least 5 consecutive days, or 5 cumulative days in a 20-day period, or places restrictions on you that your employer does not accommodate, you may be entitled to TTD compensation.    Temporary Partial Disability (TPD): If the wage you receive upon reemployment is less than the compensation for TTD to which you are entitled, the insurer may be required to pay you TPD compensation to make up the difference. TPD can only be paid for a maximum of 24 months.    Permanent Partial Disability (PPD): When your medical condition is stable and there is an indication of a PPD as a result of your injury or OD, within 30 days, your insurer must arrange for an evaluation by a rating physician or chiropractor to determine the degree of your PPD. The amount of your PPD award depends on the date of injury, the results of the PPD evaluation, your age and wage.    Permanent Total Disability (PTD): If you are medically certified by a treating physician or chiropractor as permanently and totally disabled and have been granted a PTD status by your insurer, you are entitled to receive monthly benefits not to exceed 66 2/3% of your average monthly wage. The amount of your PTD payments is subject to reduction if you previously received a lump-sum PPD award.    Vocational Rehabilitation Services: You may be eligible for vocational rehabilitation services if you are unable to return to the job due to a permanent physical impairment or permanent restrictions as a result of your injury or occupational disease.    Transportation and Per Bandar Reimbursement: You may be eligible for travel expenses and per bandar associated with medical treatment.    Reopening: You may be able to reopen your claim if your condition worsens  after claim closure.     Appeal Process: If you disagree with a written determination issued by the insurer or the insurer does not respond to your request, you may appeal to the Department of Administration, , by following the instructions contained in your determination letter. You must appeal the determination within 70 days from the date of the determination letter at [address], [address], [place], [place] [address], or [address], [address], [place], [place] [address]. If you disagree with the  decision, you may appeal to the Department of Administration, . You must file your appeal within 30 days from the date of the  decision letter at [address], [address], [place], [place] [address], or [address] [facility] [address], [place], [place] [address]. If you disagree with a decision of an , you may file a petition for judicial review with the District Court. You must do so within 30 days of the Appeal Officer’s decision. You may be represented by an  at your own expense or you may contact the [facility] for possible representation.    [place]  for Injured Workers (NAIW): If you disagree with a  decision, you may request that NAIW represent you without charge at an  Hearing. For information regarding denial of benefits, you may contact the NA at: [address], [address], [place] [address], [phone]) [phone], or [address] [facility], [facility] [address], [place] [address], [phone]    To File a Complaint with the Division: If you wish to file a complaint with the  of the Division of Industrial Relations (DIR),  please contact the Workers’ Compensation Section, [address], [address], [place], [place] [address], telephone ([phone]) [phone], or [address], [place], [place] [address], telephone [phone].    For assistance  with Workers’ Compensation Issues: You may contact the [facility] Office for Consumer Health Assistance, [facility] [facility] [facility] [address], [place] [address], Toll Free 1-823.631.1523, Web site: http://[facility].[place].gov/Programs/NOLBERTO E-mail: nolberto@[facility].[place].gov              03/10/2021            __________________________________________________________________                                    _________________            Employee Name / Signature                                                                                                                            Date                                                                                                                                                                                                                              D-2 (rev. 10/20)

## 2021-03-11 NOTE — PROGRESS NOTES
No chief complaint on file.      HISTORY OF PRESENT ILLNESS: Patient is a 35 y.o. male who presents today because he is here for Worker's Comp. injury.    Date of injury is today 3/10/2021.  Approximately 3:00.  This is his first visit for this injury.    He was scraping decals off of a register with a knife and cut his left thumb.  He is up-to-date on his tetanus shot.  Denies any distal paresthesias or loss of use    This is a 35 y.o. male who presents for repair of skin laceration(s).      Past Medical History:   Diagnosis Date   • Alcohol dependence (HCC)        Past Surgical History:   Procedure Laterality Date   • CRANIOTOMY         Social History     Socioeconomic History   • Marital status: Single     Spouse name: Not on file   • Number of children: Not on file   • Years of education: Not on file   • Highest education level: Not on file   Occupational History   • Not on file   Tobacco Use   • Smoking status: Never Smoker   • Smokeless tobacco: Never Used   Substance and Sexual Activity   • Alcohol use: Yes     Comment: 1/3 of a half gallon a day of vodka   • Drug use: Yes     Types: Marijuana, Inhaled     Comment: THC   • Sexual activity: Yes     Partners: Female   Other Topics Concern   • Not on file   Social History Narrative   • Not on file     Social Determinants of Health     Financial Resource Strain:    • Difficulty of Paying Living Expenses:    Food Insecurity:    • Worried About Running Out of Food in the Last Year:    • Ran Out of Food in the Last Year:    Transportation Needs:    • Lack of Transportation (Medical):    • Lack of Transportation (Non-Medical):    Physical Activity:    • Days of Exercise per Week:    • Minutes of Exercise per Session:    Stress:    • Feeling of Stress :    Social Connections:    • Frequency of Communication with Friends and Family:    • Frequency of Social Gatherings with Friends and Family:    • Attends Christianity Services:    • Active Member of Clubs or  Organizations:    • Attends Club or Organization Meetings:    • Marital Status:    Intimate Partner Violence:    • Fear of Current or Ex-Partner:    • Emotionally Abused:    • Physically Abused:    • Sexually Abused:        History reviewed. No pertinent family history.    Current Outpatient Medications   Medication Sig Dispense Refill   • potassium chloride SA (KDUR) 20 MEQ Tab CR Take 1 Tab by mouth every day. (Patient not taking: Reported on 11/17/2020) 60 Tab 11   • thiamine (THIAMINE) 100 MG tablet Take 1 Tab by mouth every day. (Patient not taking: Reported on 11/17/2020) 30 Tab 0   • folic acid (FOLVITE) 1 MG Tab Take 1 Tab by mouth every day. (Patient not taking: Reported on 11/17/2020) 30 Tab 0   • levetiracetam (KEPPRA) 500 MG Tab Take 1 Tab by mouth 2 Times a Day. (Patient not taking: Reported on 11/17/2020) 90 Tab 1     No current facility-administered medications for this visit.       Tylenol    ROS:    General: Denies fever, chills, acute unexpected weight changes  Skin:  As in HPI  Cardiac:  No recent CP, palpitations, or dyspnea on exertion  Respiratory:  No acute shortness of breath, hemoptysis    PHYSICAL EXAM:  Vital signs reviewed  General:  Well developed, well nourished, NAD  Skin: 3 cm linear laceration located over the interphalangeal joint of the left thumb on the radial aspect.  Good distal range of motion, circulation and sensation    1. Laceration of left thumb without foreign body without damage to nail, initial encounter         PROCEDURE:    Permit: Procedure, benefits, risks (including those of bleeding, infection, injury, anesthesia, and allergic reaction), and alternatives explained to the patient who voiced understanding of the information. Their questions were sought and answered. Patient agreed to proceed with the laceration repair.    Indication: Laceration    Provider: Jerman Smith PA-C    Anesthesia: 2 cc 2% lidocaine without epinephrine    Description: Area prepped and  draped in a sterile fashion. The area was thoroughly irrigated and cleaned and inspected for foreign bodies. Finding no foreign bodies, laceration repair commenced. Wound edges were easily approximated.  Patient tolerated well    Hemostasis / closure: #3, 4-0 simple interrupted sutures    Complications: none    Estimated blood loss: Approximately 5 cc.     Disposition: Patient alert, and oriented.  Breathing nonlabored.  Procedure site has been cleaned, dressed, and wound care instructions have been given. Patient is to followup if there is any significant erythema, tenderness, or drainage from the procedure site.  Follow-up in 7 days for suture removal

## 2021-03-16 ENCOUNTER — OCCUPATIONAL MEDICINE (OUTPATIENT)
Dept: URGENT CARE | Facility: PHYSICIAN GROUP | Age: 36
End: 2021-03-16
Payer: COMMERCIAL

## 2021-03-16 VITALS
RESPIRATION RATE: 16 BRPM | HEART RATE: 93 BPM | WEIGHT: 124 LBS | OXYGEN SATURATION: 97 % | BODY MASS INDEX: 17.75 KG/M2 | DIASTOLIC BLOOD PRESSURE: 82 MMHG | TEMPERATURE: 98.3 F | HEIGHT: 70 IN | SYSTOLIC BLOOD PRESSURE: 110 MMHG

## 2021-03-16 DIAGNOSIS — S61.012D LACERATION OF LEFT THUMB WITHOUT FOREIGN BODY WITHOUT DAMAGE TO NAIL, SUBSEQUENT ENCOUNTER: ICD-10-CM

## 2021-03-16 PROCEDURE — 99024 POSTOP FOLLOW-UP VISIT: CPT | Performed by: PHYSICIAN ASSISTANT

## 2021-03-16 RX ORDER — PROPRANOLOL HYDROCHLORIDE 20 MG/1
TABLET ORAL
COMMUNITY
Start: 2020-12-28 | End: 2023-05-07

## 2021-03-16 RX ORDER — DIAZEPAM 5 MG/1
TABLET ORAL
COMMUNITY
Start: 2020-12-28 | End: 2023-05-07

## 2021-03-16 RX ORDER — CLONIDINE HYDROCHLORIDE 0.1 MG/1
TABLET ORAL
COMMUNITY
Start: 2020-12-28 | End: 2023-05-07

## 2021-03-16 RX ORDER — HYDROXYZINE PAMOATE 25 MG/1
CAPSULE ORAL
COMMUNITY
Start: 2021-01-27 | End: 2023-05-07

## 2021-03-16 RX ORDER — ESCITALOPRAM OXALATE 10 MG/1
TABLET ORAL
COMMUNITY
Start: 2021-01-27 | End: 2023-05-07

## 2021-03-16 ASSESSMENT — FIBROSIS 4 INDEX: FIB4 SCORE: 3.37

## 2021-03-16 NOTE — LETTER
[address] [place] [address]  Phone:  [phone] - Fax:  [phone]   [facility] Progress Report and Disability Certification  Date of Service: 3/16/2021   No Show:  No  Date / Time of Next Visit:     Claim Information   Patient Name: Cory Marmolejo  Claim Number:     Employer: [id number]  Date of Injury: 3/10/2021     Insurer / TPA: [employer]  ID / SSN:     Occupation: MAINTENANCE  Diagnosis: The encounter diagnosis was Laceration of left thumb without foreign body without damage to nail, subsequent encounter.    Medical Information   Related to Industrial Injury? Yes    Subjective Complaints:  Healed laceration   Objective Findings: No signs of infection   Pre-Existing Condition(s):     Assessment:   Condition Improved    Status: Discharged /  MMI  Permanent Disability:No    Plan:      Diagnostics:      Comments:       Disability Information   Status: Released to Full Duty    From:     Through:   Restrictions are:     Physical Restrictions   Sitting:    Standing:    Stooping:    Bending:      Squatting:    Walking:    Climbing:    Pushing:      Pulling:    Other:    Reaching Above Shoulder (L):   Reaching Above Shoulder (R):       Reaching Below Shoulder (L):    Reaching Below Shoulder (R):      Not to exceed Weight Limits   Carrying(hrs):   Weight Limit(lb):   Lifting(hrs):   Weight  Limit(lb):     Comments:      Repetitive Actions   Hands: i.e. Fine Manipulations from Grasping:     Feet: i.e. Operating Foot Controls:     Driving / Operate Machinery:     Provider Name:   Jerman Smith P.A.-C. Physician Signature:  Physician Name:     Clinic Name / Location: [address] [place] [address] Clinic Phone Number: Dept: [phone]   Appointment Time: 1:00 Pm Visit Start Time: 12:45 PM   Check-In Time:  12:44 Pm Visit Discharge Time:  1:00 PM   Original-Treating Physician or Chiropractor     Page 2-Insurer/TPA    Page 3-Employer    Page 4-Employee

## 2021-03-16 NOTE — PROGRESS NOTES
Chief Complaint   Patient presents with   • Suture / Staple Removal       HISTORY OF PRESENT ILLNESS: Patient is a 35 y.o. male who presents today because he is here for a Worker's Comp. follow-up visit.    Date of injury is 3/10/2021 at approximately 3 PM.  This is his second visit in urgent care.    He was scraping decals off of a register with a knife and cut his left thumb.  He is up-to-date on his tetanus shot.  He came into the urgent care and had 3 sutures placed to stop the bleeding.  Since his last visit a week ago, the site appears to be healing well without signs of infection.    Patient Active Problem List    Diagnosis Date Noted   • Chronic liver failure (HCC) 06/19/2019   • Seizure disorder (HCC) 01/09/2018   • Transaminitis 01/09/2018   • Hyponatremia 01/09/2018   • Severe protein-calorie malnutrition (HCC) 06/20/2019   • Marijuana use 01/09/2018       Allergies:Tylenol    Current Outpatient Medications Ordered in Epic   Medication Sig Dispense Refill   • propranolol (INDERAL) 20 MG Tab TAKE 1 TABLET BY MOUTH EVERY 8 HOURS AS NEEDED FOR PULSE GREATER THAN 100     • hydrOXYzine pamoate (VISTARIL) 25 MG Cap      • escitalopram (LEXAPRO) 10 MG Tab      • diazePAM (VALIUM) 5 MG Tab TAKE 2 TABLETS BY MOUTH EVERY 6 HOURS FOR 4 DOSES THEN 1 TAB EVERY 6 HOURS FOR 8 DOSES AS NEEDED FOR WITHDRAWAL SYMPTOMS THEN DISCONTINUE     • cloNIDine (CATAPRES) 0.1 MG Tab TAKE 1 TABLET BY MOUTH EVERY 4 HOURS AS NEEDED FOR ALCOHOL WITHDRAWAL OR BLOOD PRESSURE AT OR GREATER THAN 160 100     • potassium chloride SA (KDUR) 20 MEQ Tab CR Take 1 Tab by mouth every day. (Patient not taking: Reported on 11/17/2020) 60 Tab 11   • thiamine (THIAMINE) 100 MG tablet Take 1 Tab by mouth every day. (Patient not taking: Reported on 11/17/2020) 30 Tab 0   • folic acid (FOLVITE) 1 MG Tab Take 1 Tab by mouth every day. (Patient not taking: Reported on 11/17/2020) 30 Tab 0   • levetiracetam (KEPPRA) 500 MG Tab Take 1 Tab by mouth 2 Times a  "Day. (Patient not taking: Reported on 11/17/2020) 90 Tab 1     No current Epic-ordered facility-administered medications on file.       Past Medical History:   Diagnosis Date   • Alcohol dependence (HCC)        Social History     Tobacco Use   • Smoking status: Never Smoker   • Smokeless tobacco: Never Used   Substance Use Topics   • Alcohol use: Yes     Comment: 1/3 of a half gallon a day of vodka   • Drug use: Yes     Types: Marijuana, Inhaled     Comment: THC       Family Status   Relation Name Status   • Mo  Alive   • Fa  Alive   History reviewed. No pertinent family history.    ROS:  Review of Systems   Constitutional: Negative for fever, chills, weight loss and malaise/fatigue.   HENT: Negative for ear pain, nosebleeds, congestion, sore throat and neck pain.    Eyes: Negative for blurred vision.   Respiratory: Negative for cough, sputum production, shortness of breath and wheezing.    Cardiovascular: Negative for chest pain, palpitations, orthopnea and leg swelling.   Gastrointestinal: Negative for heartburn, nausea, vomiting and abdominal pain.   Genitourinary: Negative for dysuria, urgency and frequency.     Exam:  /82   Pulse 93   Temp 36.8 °C (98.3 °F) (Temporal)   Resp 16   Ht 1.778 m (5' 10\")   Wt 56.2 kg (124 lb)   SpO2 97%   General:  Well nourished, well developed male in NAD  Head:Normocephalic, atraumatic  Eyes: PERRLA, EOM within normal limits, no conjunctival injection, no scleral icterus, visual fields and acuity grossly intact.  Nose: Symmetrical without tenderness, no discharge.  Mouth: reasonable hygiene, no erythema exudates or tonsillar enlargement.  Neck: no masses, range of motion within normal limits, no tracheal deviation. No obvious thyroid enlargement.  Extremities: no clubbing, cyanosis, or edema.  Healed laceration to left thumb.    Please note that this dictation was created using voice recognition software. I have made every reasonable attempt to correct obvious errors, " but I expect that there are errors of grammar and possibly content that I did not discover before finalizing the note.    Assessment/Plan:  1. Laceration of left thumb without foreign body without damage to nail, subsequent encounter       Laceration healed, sutures removed without complication

## 2022-12-05 ENCOUNTER — NON-PROVIDER VISIT (OUTPATIENT)
Dept: URGENT CARE | Facility: PHYSICIAN GROUP | Age: 37
End: 2022-12-05

## 2022-12-05 DIAGNOSIS — Z02.1 PRE-EMPLOYMENT DRUG SCREENING: ICD-10-CM

## 2022-12-05 LAB
BREATH ALCOHOL COMMENT: NORMAL
POC BREATHALIZER: 0 PERCENT (ref 0–0.01)

## 2022-12-05 PROCEDURE — 8907 PR URINE COLLECT ONLY: Performed by: NURSE PRACTITIONER

## 2022-12-05 PROCEDURE — 82075 ASSAY OF BREATH ETHANOL: CPT | Performed by: NURSE PRACTITIONER

## 2023-04-18 ENCOUNTER — NON-PROVIDER VISIT (OUTPATIENT)
Dept: URGENT CARE | Facility: PHYSICIAN GROUP | Age: 38
End: 2023-04-18

## 2023-04-18 DIAGNOSIS — Z02.1 PRE-EMPLOYMENT DRUG SCREENING: ICD-10-CM

## 2023-04-18 DIAGNOSIS — Z02.83 ENCOUNTER FOR DRUG SCREENING: ICD-10-CM

## 2023-04-18 LAB
AMP AMPHETAMINE: NORMAL
BAR BARBITURATES: NORMAL
BREATH ALCOHOL COMMENT: NORMAL
BZO BENZODIAZEPINES: NORMAL
COC COCAINE: NORMAL
INT CON NEG: NORMAL
INT CON POS: NORMAL
MDMA ECSTASY: NORMAL
MET METHAMPHETAMINES: NORMAL
MTD METHADONE: NORMAL
OPI OPIATES: NORMAL
OXY OXYCODONE: NORMAL
PCP PHENCYCLIDINE: NORMAL
POC BREATHALIZER: NEGATIVE PERCENT (ref 0–0.01)
POC URINE DRUG SCREEN OCDRS: NEGATIVE
THC: NORMAL

## 2023-04-18 PROCEDURE — 82075 ASSAY OF BREATH ETHANOL: CPT | Performed by: STUDENT IN AN ORGANIZED HEALTH CARE EDUCATION/TRAINING PROGRAM

## 2023-04-18 PROCEDURE — 80305 DRUG TEST PRSMV DIR OPT OBS: CPT | Performed by: STUDENT IN AN ORGANIZED HEALTH CARE EDUCATION/TRAINING PROGRAM

## 2023-04-18 PROCEDURE — 8907 PR URINE COLLECT ONLY: Performed by: STUDENT IN AN ORGANIZED HEALTH CARE EDUCATION/TRAINING PROGRAM

## 2023-05-07 ENCOUNTER — HOSPITAL ENCOUNTER (OUTPATIENT)
Dept: RADIOLOGY | Facility: MEDICAL CENTER | Age: 38
End: 2023-05-07

## 2023-05-07 ENCOUNTER — HOSPITAL ENCOUNTER (INPATIENT)
Facility: MEDICAL CENTER | Age: 38
LOS: 8 days | DRG: 208 | End: 2023-05-16
Attending: EMERGENCY MEDICINE | Admitting: INTERNAL MEDICINE
Payer: MEDICAID

## 2023-05-07 ENCOUNTER — APPOINTMENT (OUTPATIENT)
Dept: RADIOLOGY | Facility: MEDICAL CENTER | Age: 38
DRG: 208 | End: 2023-05-07
Attending: EMERGENCY MEDICINE
Payer: MEDICAID

## 2023-05-07 DIAGNOSIS — E87.6 HYPOKALEMIA: ICD-10-CM

## 2023-05-07 DIAGNOSIS — E13.11 DIABETIC KETOACIDOSIS WITH COMA ASSOCIATED WITH OTHER SPECIFIED DIABETES MELLITUS (HCC): ICD-10-CM

## 2023-05-07 DIAGNOSIS — I46.9 CARDIOPULMONARY ARREST (HCC): ICD-10-CM

## 2023-05-07 DIAGNOSIS — J69.0 ASPIRATION PNEUMONIA DUE TO GASTRIC SECRETIONS, UNSPECIFIED LATERALITY, UNSPECIFIED PART OF LUNG (HCC): ICD-10-CM

## 2023-05-07 DIAGNOSIS — J69.0 ASPIRATION PNEUMONIA OF BOTH LOWER LOBES DUE TO GASTRIC SECRETIONS (HCC): ICD-10-CM

## 2023-05-07 DIAGNOSIS — F51.04 PSYCHOPHYSIOLOGICAL INSOMNIA: ICD-10-CM

## 2023-05-07 DIAGNOSIS — F17.220 CHEWING TOBACCO NICOTINE DEPENDENCE WITHOUT COMPLICATION: ICD-10-CM

## 2023-05-07 DIAGNOSIS — S06.9X0D TRAUMATIC BRAIN INJURY, WITHOUT LOSS OF CONSCIOUSNESS, SUBSEQUENT ENCOUNTER: ICD-10-CM

## 2023-05-07 DIAGNOSIS — M62.82 NON-TRAUMATIC RHABDOMYOLYSIS: ICD-10-CM

## 2023-05-07 DIAGNOSIS — R07.2 PRECORDIAL PAIN: ICD-10-CM

## 2023-05-07 DIAGNOSIS — L03.818 CELLULITIS OF OTHER SPECIFIED SITE: ICD-10-CM

## 2023-05-07 DIAGNOSIS — K21.9 GASTROESOPHAGEAL REFLUX DISEASE WITHOUT ESOPHAGITIS: ICD-10-CM

## 2023-05-07 DIAGNOSIS — R56.9 SEIZURE-LIKE ACTIVITY (HCC): ICD-10-CM

## 2023-05-07 DIAGNOSIS — R56.9 SEIZURE (HCC): ICD-10-CM

## 2023-05-07 DIAGNOSIS — E83.42 HYPOMAGNESEMIA: ICD-10-CM

## 2023-05-07 DIAGNOSIS — I46.9 CARDIAC ARREST (HCC): ICD-10-CM

## 2023-05-07 DIAGNOSIS — E11.11 DIABETIC KETOACIDOSIS WITH COMA ASSOCIATED WITH TYPE 2 DIABETES MELLITUS (HCC): ICD-10-CM

## 2023-05-07 LAB
BASE EXCESS BLDA CALC-SCNC: -21 MMOL/L (ref -4–3)
BODY TEMPERATURE: ABNORMAL DEGREES
BREATHS SETTING VENT: 20
CA-I BLD ISE-SCNC: 1.37 MMOL/L (ref 1.1–1.3)
CO2 BLDA-SCNC: 11 MMOL/L (ref 20–33)
DELSYS IDSYS: ABNORMAL
HCO3 BLDA-SCNC: 9.6 MMOL/L (ref 17–25)
HOROWITZ INDEX BLDA+IHG-RTO: 157 MM[HG]
MODE IMODE: ABNORMAL
O2/TOTAL GAS SETTING VFR VENT: 60 %
PCO2 BLDA: 36.4 MMHG (ref 26–37)
PEEP END EXPIRATORY PRESSURE IPEEP: 8 CMH20
PH BLDA: 7.03 [PH] (ref 7.4–7.5)
PO2 BLDA: 94 MMHG (ref 64–87)
POTASSIUM BLD-SCNC: 3.4 MMOL/L (ref 3.6–5.5)
SAO2 % BLDA: 93 % (ref 93–99)
SODIUM BLD-SCNC: 110 MMOL/L (ref 135–145)
SPECIMEN DRAWN FROM PATIENT: ABNORMAL
TIDAL VOLUME IVT: 400 ML

## 2023-05-07 PROCEDURE — 303105 HCHG CATHETER EXTRA

## 2023-05-07 PROCEDURE — 84484 ASSAY OF TROPONIN QUANT: CPT

## 2023-05-07 PROCEDURE — 83880 ASSAY OF NATRIURETIC PEPTIDE: CPT

## 2023-05-07 PROCEDURE — 99292 CRITICAL CARE ADDL 30 MIN: CPT | Mod: GC | Performed by: INTERNAL MEDICINE

## 2023-05-07 PROCEDURE — 94002 VENT MGMT INPAT INIT DAY: CPT

## 2023-05-07 PROCEDURE — 51702 INSERT TEMP BLADDER CATH: CPT

## 2023-05-07 PROCEDURE — 99291 CRITICAL CARE FIRST HOUR: CPT | Mod: GC | Performed by: INTERNAL MEDICINE

## 2023-05-07 PROCEDURE — 83605 ASSAY OF LACTIC ACID: CPT

## 2023-05-07 PROCEDURE — 80053 COMPREHEN METABOLIC PANEL: CPT

## 2023-05-07 PROCEDURE — 71045 X-RAY EXAM CHEST 1 VIEW: CPT

## 2023-05-07 PROCEDURE — 96374 THER/PROPH/DIAG INJ IV PUSH: CPT

## 2023-05-07 PROCEDURE — 84100 ASSAY OF PHOSPHORUS: CPT

## 2023-05-07 PROCEDURE — 83690 ASSAY OF LIPASE: CPT

## 2023-05-07 PROCEDURE — 84478 ASSAY OF TRIGLYCERIDES: CPT

## 2023-05-07 PROCEDURE — 84443 ASSAY THYROID STIM HORMONE: CPT

## 2023-05-07 PROCEDURE — 84295 ASSAY OF SERUM SODIUM: CPT

## 2023-05-07 PROCEDURE — 82962 GLUCOSE BLOOD TEST: CPT

## 2023-05-07 PROCEDURE — 87040 BLOOD CULTURE FOR BACTERIA: CPT

## 2023-05-07 PROCEDURE — 83735 ASSAY OF MAGNESIUM: CPT

## 2023-05-07 PROCEDURE — 82010 KETONE BODYS QUAN: CPT

## 2023-05-07 PROCEDURE — 84132 ASSAY OF SERUM POTASSIUM: CPT

## 2023-05-07 PROCEDURE — 93005 ELECTROCARDIOGRAM TRACING: CPT | Performed by: EMERGENCY MEDICINE

## 2023-05-07 PROCEDURE — 85610 PROTHROMBIN TIME: CPT

## 2023-05-07 PROCEDURE — 36415 COLL VENOUS BLD VENIPUNCTURE: CPT

## 2023-05-07 PROCEDURE — 82330 ASSAY OF CALCIUM: CPT

## 2023-05-07 PROCEDURE — 83930 ASSAY OF BLOOD OSMOLALITY: CPT

## 2023-05-07 PROCEDURE — 85025 COMPLETE CBC W/AUTO DIFF WBC: CPT

## 2023-05-07 PROCEDURE — 85730 THROMBOPLASTIN TIME PARTIAL: CPT

## 2023-05-07 PROCEDURE — 82803 BLOOD GASES ANY COMBINATION: CPT

## 2023-05-07 PROCEDURE — 99291 CRITICAL CARE FIRST HOUR: CPT

## 2023-05-07 PROCEDURE — 96375 TX/PRO/DX INJ NEW DRUG ADDON: CPT

## 2023-05-07 RX ORDER — INSULIN GLARGINE 100 [IU]/ML
30 INJECTION, SOLUTION SUBCUTANEOUS DAILY
Status: ON HOLD | COMMUNITY
Start: 2023-02-21 | End: 2023-05-15

## 2023-05-07 RX ORDER — CEFTRIAXONE 2 G/1
2000 INJECTION, POWDER, FOR SOLUTION INTRAMUSCULAR; INTRAVENOUS ONCE
Status: COMPLETED | OUTPATIENT
Start: 2023-05-07 | End: 2023-05-08

## 2023-05-07 RX ORDER — ACETAMINOPHEN 500 MG
1000 TABLET ORAL EVERY 6 HOURS
Status: CANCELLED | OUTPATIENT
Start: 2023-05-08

## 2023-05-07 RX ORDER — MEPERIDINE HYDROCHLORIDE 50 MG/ML
25 INJECTION INTRAMUSCULAR; INTRAVENOUS; SUBCUTANEOUS
Status: CANCELLED | OUTPATIENT
Start: 2023-05-07

## 2023-05-07 RX ORDER — INSULIN LISPRO 100 [IU]/ML
10 INJECTION, SOLUTION INTRAVENOUS; SUBCUTANEOUS
Status: ON HOLD | COMMUNITY
Start: 2023-02-20 | End: 2023-05-15

## 2023-05-07 RX ORDER — ONDANSETRON 4 MG/1
4 TABLET, ORALLY DISINTEGRATING ORAL EVERY 8 HOURS PRN
Status: ON HOLD | COMMUNITY
Start: 2023-04-22 | End: 2023-05-15

## 2023-05-08 ENCOUNTER — APPOINTMENT (OUTPATIENT)
Dept: RADIOLOGY | Facility: MEDICAL CENTER | Age: 38
DRG: 208 | End: 2023-05-08
Attending: STUDENT IN AN ORGANIZED HEALTH CARE EDUCATION/TRAINING PROGRAM
Payer: MEDICAID

## 2023-05-08 ENCOUNTER — APPOINTMENT (OUTPATIENT)
Dept: CARDIOLOGY | Facility: MEDICAL CENTER | Age: 38
DRG: 208 | End: 2023-05-08
Attending: INTERNAL MEDICINE
Payer: MEDICAID

## 2023-05-08 ENCOUNTER — APPOINTMENT (OUTPATIENT)
Dept: RADIOLOGY | Facility: MEDICAL CENTER | Age: 38
DRG: 208 | End: 2023-05-08
Attending: INTERNAL MEDICINE
Payer: MEDICAID

## 2023-05-08 PROBLEM — M62.82 RHABDOMYOLYSIS: Status: ACTIVE | Noted: 2023-05-08

## 2023-05-08 PROBLEM — I46.9 CARDIAC ARREST (HCC): Status: ACTIVE | Noted: 2023-05-08

## 2023-05-08 PROBLEM — E87.20 LACTIC ACIDOSIS: Status: ACTIVE | Noted: 2023-05-08

## 2023-05-08 PROBLEM — R73.9 HYPERGLYCEMIA: Status: ACTIVE | Noted: 2023-05-08

## 2023-05-08 PROBLEM — E87.29 HIGH ANION GAP METABOLIC ACIDOSIS: Status: ACTIVE | Noted: 2023-05-08

## 2023-05-08 PROBLEM — S06.9XAA TBI (TRAUMATIC BRAIN INJURY) (HCC): Status: ACTIVE | Noted: 2023-05-08

## 2023-05-08 PROBLEM — R56.9 SEIZURE (HCC): Status: ACTIVE | Noted: 2023-05-08

## 2023-05-08 PROBLEM — E11.11 DIABETIC KETOACIDOSIS WITH COMA ASSOCIATED WITH TYPE 2 DIABETES MELLITUS (HCC): Status: ACTIVE | Noted: 2023-05-08

## 2023-05-08 PROBLEM — N17.9 AKI (ACUTE KIDNEY INJURY) (HCC): Status: ACTIVE | Noted: 2023-05-08

## 2023-05-08 PROBLEM — R57.9 SHOCK (HCC): Status: ACTIVE | Noted: 2023-05-08

## 2023-05-08 PROBLEM — K92.0 HEMATEMESIS: Status: ACTIVE | Noted: 2023-05-08

## 2023-05-08 PROBLEM — J96.01 ACUTE RESPIRATORY FAILURE WITH HYPOXIA (HCC): Status: ACTIVE | Noted: 2023-05-08

## 2023-05-08 LAB
ALBUMIN SERPL BCP-MCNC: 3 G/DL (ref 3.2–4.9)
ALBUMIN/GLOB SERPL: 1.5 G/DL
ALP SERPL-CCNC: 175 U/L (ref 30–99)
ALT SERPL-CCNC: 126 U/L (ref 2–50)
AMMONIA PLAS-SCNC: 56 UMOL/L (ref 11–45)
AMORPH CRY #/AREA URNS HPF: PRESENT /HPF
AMPHET UR QL SCN: NEGATIVE
ANION GAP SERPL CALC-SCNC: 12 MMOL/L (ref 7–16)
ANION GAP SERPL CALC-SCNC: 14 MMOL/L (ref 7–16)
ANION GAP SERPL CALC-SCNC: 17 MMOL/L (ref 7–16)
ANION GAP SERPL CALC-SCNC: 19 MMOL/L (ref 7–16)
ANION GAP SERPL CALC-SCNC: 22 MMOL/L (ref 7–16)
ANION GAP SERPL CALC-SCNC: 30 MMOL/L (ref 7–16)
ANION GAP SERPL CALC-SCNC: 33 MMOL/L (ref 7–16)
APPEARANCE UR: ABNORMAL
APPEARANCE UR: ABNORMAL
APTT PPP: 36.5 SEC (ref 24.7–36)
ARTERIAL PATENCY WRIST A: ABNORMAL
AST SERPL-CCNC: 356 U/L (ref 12–45)
B-OH-BUTYR SERPL-MCNC: >8 MMOL/L (ref 0.02–0.27)
BACTERIA #/AREA URNS HPF: ABNORMAL /HPF
BACTERIA #/AREA URNS HPF: ABNORMAL /HPF
BARBITURATES UR QL SCN: NEGATIVE
BASE EXCESS BLDA CALC-SCNC: -17 MMOL/L (ref -4–3)
BASOPHILS # BLD AUTO: 0.3 % (ref 0–1.8)
BASOPHILS # BLD AUTO: 0.3 % (ref 0–1.8)
BASOPHILS # BLD: 0.01 K/UL (ref 0–0.12)
BASOPHILS # BLD: 0.03 K/UL (ref 0–0.12)
BENZODIAZ UR QL SCN: POSITIVE
BILIRUB SERPL-MCNC: 0.6 MG/DL (ref 0.1–1.5)
BILIRUB UR QL STRIP.AUTO: NEGATIVE
BILIRUB UR QL STRIP.AUTO: NEGATIVE
BODY TEMPERATURE: ABNORMAL DEGREES
BREATHS SETTING VENT: 28
BUN SERPL-MCNC: 68 MG/DL (ref 8–22)
BUN SERPL-MCNC: 68 MG/DL (ref 8–22)
BUN SERPL-MCNC: 70 MG/DL (ref 8–22)
BUN SERPL-MCNC: 71 MG/DL (ref 8–22)
BUN SERPL-MCNC: 71 MG/DL (ref 8–22)
BUN SERPL-MCNC: 72 MG/DL (ref 8–22)
BUN SERPL-MCNC: 73 MG/DL (ref 8–22)
BZE UR QL SCN: NEGATIVE
CALCIUM ALBUM COR SERPL-MCNC: 9.9 MG/DL (ref 8.5–10.5)
CALCIUM SERPL-MCNC: 7.1 MG/DL (ref 8.5–10.5)
CALCIUM SERPL-MCNC: 7.5 MG/DL (ref 8.5–10.5)
CALCIUM SERPL-MCNC: 7.7 MG/DL (ref 8.5–10.5)
CALCIUM SERPL-MCNC: 7.8 MG/DL (ref 8.5–10.5)
CALCIUM SERPL-MCNC: 8 MG/DL (ref 8.5–10.5)
CALCIUM SERPL-MCNC: 8.6 MG/DL (ref 8.5–10.5)
CALCIUM SERPL-MCNC: 9.1 MG/DL (ref 8.5–10.5)
CANNABINOIDS UR QL SCN: NEGATIVE
CHLORIDE SERPL-SCNC: 70 MMOL/L (ref 96–112)
CHLORIDE SERPL-SCNC: 73 MMOL/L (ref 96–112)
CHLORIDE SERPL-SCNC: 79 MMOL/L (ref 96–112)
CHLORIDE SERPL-SCNC: 82 MMOL/L (ref 96–112)
CHLORIDE SERPL-SCNC: 87 MMOL/L (ref 96–112)
CHLORIDE SERPL-SCNC: 95 MMOL/L (ref 96–112)
CHLORIDE SERPL-SCNC: 95 MMOL/L (ref 96–112)
CK SERPL-CCNC: ABNORMAL U/L (ref 0–154)
CO2 BLDA-SCNC: 12 MMOL/L (ref 20–33)
CO2 SERPL-SCNC: 11 MMOL/L (ref 20–33)
CO2 SERPL-SCNC: 12 MMOL/L (ref 20–33)
CO2 SERPL-SCNC: 14 MMOL/L (ref 20–33)
CO2 SERPL-SCNC: 14 MMOL/L (ref 20–33)
CO2 SERPL-SCNC: 15 MMOL/L (ref 20–33)
CO2 SERPL-SCNC: 7 MMOL/L (ref 20–33)
CO2 SERPL-SCNC: 9 MMOL/L (ref 20–33)
COLOR UR: ABNORMAL
COLOR UR: YELLOW
CREAT SERPL-MCNC: 1.48 MG/DL (ref 0.5–1.4)
CREAT SERPL-MCNC: 1.61 MG/DL (ref 0.5–1.4)
CREAT SERPL-MCNC: 1.67 MG/DL (ref 0.5–1.4)
CREAT SERPL-MCNC: 1.69 MG/DL (ref 0.5–1.4)
CREAT SERPL-MCNC: 1.72 MG/DL (ref 0.5–1.4)
CREAT SERPL-MCNC: 1.91 MG/DL (ref 0.5–1.4)
CREAT SERPL-MCNC: 2.05 MG/DL (ref 0.5–1.4)
CREAT UR-MCNC: 32.98 MG/DL
DELSYS IDSYS: ABNORMAL
EKG IMPRESSION: NORMAL
EOSINOPHIL # BLD AUTO: 0 K/UL (ref 0–0.51)
EOSINOPHIL # BLD AUTO: 0.01 K/UL (ref 0–0.51)
EOSINOPHIL NFR BLD: 0 % (ref 0–6.9)
EOSINOPHIL NFR BLD: 0.1 % (ref 0–6.9)
EPI CELLS #/AREA URNS HPF: ABNORMAL /HPF
EPI CELLS #/AREA URNS HPF: ABNORMAL /HPF
ERYTHROCYTE [DISTWIDTH] IN BLOOD BY AUTOMATED COUNT: 34.3 FL (ref 35.9–50)
ERYTHROCYTE [DISTWIDTH] IN BLOOD BY AUTOMATED COUNT: 35.3 FL (ref 35.9–50)
ERYTHROCYTE [DISTWIDTH] IN BLOOD BY AUTOMATED COUNT: 37 FL (ref 35.9–50)
FENTANYL UR QL: NEGATIVE
GFR SERPLBLD CREATININE-BSD FMLA CKD-EPI: 42 ML/MIN/1.73 M 2
GFR SERPLBLD CREATININE-BSD FMLA CKD-EPI: 46 ML/MIN/1.73 M 2
GFR SERPLBLD CREATININE-BSD FMLA CKD-EPI: 52 ML/MIN/1.73 M 2
GFR SERPLBLD CREATININE-BSD FMLA CKD-EPI: 53 ML/MIN/1.73 M 2
GFR SERPLBLD CREATININE-BSD FMLA CKD-EPI: 54 ML/MIN/1.73 M 2
GFR SERPLBLD CREATININE-BSD FMLA CKD-EPI: 56 ML/MIN/1.73 M 2
GFR SERPLBLD CREATININE-BSD FMLA CKD-EPI: 62 ML/MIN/1.73 M 2
GLOBULIN SER CALC-MCNC: 2 G/DL (ref 1.9–3.5)
GLUCOSE BLD STRIP.AUTO-MCNC: 100 MG/DL (ref 65–99)
GLUCOSE BLD STRIP.AUTO-MCNC: 110 MG/DL (ref 65–99)
GLUCOSE BLD STRIP.AUTO-MCNC: 116 MG/DL (ref 65–99)
GLUCOSE BLD STRIP.AUTO-MCNC: 171 MG/DL (ref 65–99)
GLUCOSE BLD STRIP.AUTO-MCNC: 220 MG/DL (ref 65–99)
GLUCOSE BLD STRIP.AUTO-MCNC: 315 MG/DL (ref 65–99)
GLUCOSE BLD STRIP.AUTO-MCNC: 333 MG/DL (ref 65–99)
GLUCOSE BLD STRIP.AUTO-MCNC: 560 MG/DL (ref 65–99)
GLUCOSE BLD STRIP.AUTO-MCNC: 575 MG/DL (ref 65–99)
GLUCOSE BLD STRIP.AUTO-MCNC: 64 MG/DL (ref 65–99)
GLUCOSE BLD STRIP.AUTO-MCNC: 75 MG/DL (ref 65–99)
GLUCOSE BLD STRIP.AUTO-MCNC: 81 MG/DL (ref 65–99)
GLUCOSE BLD STRIP.AUTO-MCNC: >600 MG/DL (ref 65–99)
GLUCOSE SERPL-MCNC: 104 MG/DL (ref 65–99)
GLUCOSE SERPL-MCNC: 321 MG/DL (ref 65–99)
GLUCOSE SERPL-MCNC: 476 MG/DL (ref 65–99)
GLUCOSE SERPL-MCNC: 553 MG/DL (ref 65–99)
GLUCOSE SERPL-MCNC: 574 MG/DL (ref 65–99)
GLUCOSE SERPL-MCNC: 585 MG/DL (ref 65–99)
GLUCOSE SERPL-MCNC: 620 MG/DL (ref 65–99)
GLUCOSE SERPL-MCNC: 700 MG/DL (ref 65–99)
GLUCOSE SERPL-MCNC: 739 MG/DL (ref 65–99)
GLUCOSE SERPL-MCNC: 77 MG/DL (ref 65–99)
GLUCOSE UR STRIP.AUTO-MCNC: >=1000 MG/DL
GLUCOSE UR STRIP.AUTO-MCNC: >=1000 MG/DL
GRAM STN SPEC: NORMAL
GRAN CASTS #/AREA URNS LPF: ABNORMAL /LPF
GRAN CASTS #/AREA URNS LPF: ABNORMAL /LPF
HCO3 BLDA-SCNC: 11 MMOL/L (ref 17–25)
HCT VFR BLD AUTO: 37.8 % (ref 42–52)
HCT VFR BLD AUTO: 41.1 % (ref 42–52)
HCT VFR BLD AUTO: 41.8 % (ref 42–52)
HGB BLD-MCNC: 14.7 G/DL (ref 14–18)
HGB BLD-MCNC: 15.4 G/DL (ref 14–18)
HGB BLD-MCNC: 15.9 G/DL (ref 14–18)
HOROWITZ INDEX BLDA+IHG-RTO: 108 MM[HG]
HYALINE CASTS #/AREA URNS LPF: ABNORMAL /LPF
HYALINE CASTS #/AREA URNS LPF: ABNORMAL /LPF
IMM GRANULOCYTES # BLD AUTO: 0.02 K/UL (ref 0–0.11)
IMM GRANULOCYTES # BLD AUTO: 0.11 K/UL (ref 0–0.11)
IMM GRANULOCYTES NFR BLD AUTO: 0.6 % (ref 0–0.9)
IMM GRANULOCYTES NFR BLD AUTO: 1.1 % (ref 0–0.9)
INR PPP: 1.93 (ref 0.87–1.13)
KETONES UR STRIP.AUTO-MCNC: 15 MG/DL
KETONES UR STRIP.AUTO-MCNC: 40 MG/DL
LACTATE SERPL-SCNC: 2.2 MMOL/L (ref 0.5–2)
LACTATE SERPL-SCNC: 3.3 MMOL/L (ref 0.5–2)
LACTATE SERPL-SCNC: 4.9 MMOL/L (ref 0.5–2)
LEUKOCYTE ESTERASE UR QL STRIP.AUTO: NEGATIVE
LEUKOCYTE ESTERASE UR QL STRIP.AUTO: NEGATIVE
LIPASE SERPL-CCNC: 11 U/L (ref 11–82)
LV EJECT FRACT MOD 2C 99903: 66.05
LV EJECT FRACT MOD 4C 99902: 68.69
LV EJECT FRACT MOD BP 99901: 66.88
LYMPHOCYTES # BLD AUTO: 0.93 K/UL (ref 1–4.8)
LYMPHOCYTES # BLD AUTO: 1.08 K/UL (ref 1–4.8)
LYMPHOCYTES NFR BLD: 11 % (ref 22–41)
LYMPHOCYTES NFR BLD: 29.9 % (ref 22–41)
MAGNESIUM SERPL-MCNC: 1.7 MG/DL (ref 1.5–2.5)
MAGNESIUM SERPL-MCNC: 1.8 MG/DL (ref 1.5–2.5)
MAGNESIUM SERPL-MCNC: 1.9 MG/DL (ref 1.5–2.5)
MAGNESIUM SERPL-MCNC: 2.1 MG/DL (ref 1.5–2.5)
MAGNESIUM SERPL-MCNC: 2.1 MG/DL (ref 1.5–2.5)
MAGNESIUM SERPL-MCNC: 2.5 MG/DL (ref 1.5–2.5)
MCH RBC QN AUTO: 31.8 PG (ref 27–33)
MCH RBC QN AUTO: 32.4 PG (ref 27–33)
MCH RBC QN AUTO: 32.6 PG (ref 27–33)
MCHC RBC AUTO-ENTMCNC: 37.5 G/DL (ref 33.7–35.3)
MCHC RBC AUTO-ENTMCNC: 38 G/DL (ref 33.7–35.3)
MCHC RBC AUTO-ENTMCNC: 38.9 G/DL (ref 33.7–35.3)
MCV RBC AUTO: 81.8 FL (ref 81.4–97.8)
MCV RBC AUTO: 85.1 FL (ref 81.4–97.8)
MCV RBC AUTO: 86.9 FL (ref 81.4–97.8)
METHADONE UR QL SCN: NEGATIVE
MICRO URNS: ABNORMAL
MICRO URNS: ABNORMAL
MODE IMODE: ABNORMAL
MONOCYTES # BLD AUTO: 0.01 K/UL (ref 0–0.85)
MONOCYTES # BLD AUTO: 0.11 K/UL (ref 0–0.85)
MONOCYTES NFR BLD AUTO: 0.3 % (ref 0–13.4)
MONOCYTES NFR BLD AUTO: 1.1 % (ref 0–13.4)
NEUTROPHILS # BLD AUTO: 2.14 K/UL (ref 1.82–7.42)
NEUTROPHILS # BLD AUTO: 8.46 K/UL (ref 1.82–7.42)
NEUTROPHILS NFR BLD: 68.9 % (ref 44–72)
NEUTROPHILS NFR BLD: 86.4 % (ref 44–72)
NITRITE UR QL STRIP.AUTO: NEGATIVE
NITRITE UR QL STRIP.AUTO: NEGATIVE
NRBC # BLD AUTO: 0 K/UL
NRBC # BLD AUTO: 0.02 K/UL
NRBC BLD-RTO: 0 /100 WBC
NRBC BLD-RTO: 0.6 /100 WBC
NT-PROBNP SERPL IA-MCNC: 1357 PG/ML (ref 0–125)
O2/TOTAL GAS SETTING VFR VENT: 100 %
OPIATES UR QL SCN: NEGATIVE
OSMOLALITY SERPL: 294 MOSM/KG H2O (ref 278–298)
OSMOLALITY UR: 380 MOSM/KG H2O (ref 300–900)
OXYCODONE UR QL SCN: NEGATIVE
PCO2 BLDA: 33.9 MMHG (ref 26–37)
PCO2 TEMP ADJ BLDA: 28.2 MMHG (ref 26–37)
PCP UR QL SCN: NEGATIVE
PEEP END EXPIRATORY PRESSURE IPEEP: 8 CMH20
PH BLDA: 7.12 [PH] (ref 7.4–7.5)
PH TEMP ADJ BLDA: 7.17 [PH] (ref 7.4–7.5)
PH UR STRIP.AUTO: 5 [PH] (ref 5–8)
PH UR STRIP.AUTO: 6 [PH] (ref 5–8)
PHOSPHATE SERPL-MCNC: 0.5 MG/DL (ref 2.5–4.5)
PHOSPHATE SERPL-MCNC: 3.4 MG/DL (ref 2.5–4.5)
PHOSPHATE SERPL-MCNC: 7 MG/DL (ref 2.5–4.5)
PHOSPHATE SERPL-MCNC: 8.4 MG/DL (ref 2.5–4.5)
PLATELET # BLD AUTO: 107 K/UL (ref 164–446)
PLATELET # BLD AUTO: 167 K/UL (ref 164–446)
PLATELET # BLD AUTO: 192 K/UL (ref 164–446)
PMV BLD AUTO: 10.1 FL (ref 9–12.9)
PMV BLD AUTO: 10.2 FL (ref 9–12.9)
PMV BLD AUTO: 10.5 FL (ref 9–12.9)
PO2 BLDA: 108 MMHG (ref 64–87)
PO2 TEMP ADJ BLDA: 85 MMHG (ref 64–87)
POTASSIUM SERPL-SCNC: 3.6 MMOL/L (ref 3.6–5.5)
POTASSIUM SERPL-SCNC: 3.7 MMOL/L (ref 3.6–5.5)
POTASSIUM SERPL-SCNC: 4.1 MMOL/L (ref 3.6–5.5)
POTASSIUM SERPL-SCNC: 4.3 MMOL/L (ref 3.6–5.5)
POTASSIUM SERPL-SCNC: 4.5 MMOL/L (ref 3.6–5.5)
POTASSIUM SERPL-SCNC: 4.5 MMOL/L (ref 3.6–5.5)
POTASSIUM SERPL-SCNC: 4.6 MMOL/L (ref 3.6–5.5)
POTASSIUM SERPL-SCNC: 4.8 MMOL/L (ref 3.6–5.5)
PROPOXYPH UR QL SCN: NEGATIVE
PROT SERPL-MCNC: 5 G/DL (ref 6–8.2)
PROT UR QL STRIP: 100 MG/DL
PROT UR QL STRIP: 100 MG/DL
PROTHROMBIN TIME: 21.5 SEC (ref 12–14.6)
RBC # BLD AUTO: 4.62 M/UL (ref 4.7–6.1)
RBC # BLD AUTO: 4.73 M/UL (ref 4.7–6.1)
RBC # BLD AUTO: 4.91 M/UL (ref 4.7–6.1)
RBC # URNS HPF: ABNORMAL /HPF
RBC # URNS HPF: ABNORMAL /HPF
RBC UR QL AUTO: ABNORMAL
RBC UR QL AUTO: ABNORMAL
SAO2 % BLDA: 96 % (ref 93–99)
SIGNIFICANT IND 70042: NORMAL
SITE SITE: NORMAL
SODIUM SERPL-SCNC: 110 MMOL/L (ref 135–145)
SODIUM SERPL-SCNC: 110 MMOL/L (ref 135–145)
SODIUM SERPL-SCNC: 113 MMOL/L (ref 135–145)
SODIUM SERPL-SCNC: 114 MMOL/L (ref 135–145)
SODIUM SERPL-SCNC: 118 MMOL/L (ref 135–145)
SODIUM SERPL-SCNC: 122 MMOL/L (ref 135–145)
SODIUM SERPL-SCNC: 123 MMOL/L (ref 135–145)
SODIUM UR-SCNC: <20 MMOL/L
SOURCE SOURCE: NORMAL
SP GR UR STRIP.AUTO: 1.01
SP GR UR STRIP.AUTO: >=1.03
SPECIMEN DRAWN FROM PATIENT: ABNORMAL
SPERM #/AREA URNS HPF: ABNORMAL /HPF
TIDAL VOLUME IVT: 450 ML
TRANS CELLS #/AREA URNS HPF: ABNORMAL /HPF
TRANS CELLS #/AREA URNS HPF: ABNORMAL /HPF
TRIGL SERPL-MCNC: 300 MG/DL (ref 0–149)
TROPONIN T SERPL-MCNC: 25 NG/L (ref 6–19)
TROPONIN T SERPL-MCNC: 35 NG/L (ref 6–19)
TSH SERPL DL<=0.005 MIU/L-ACNC: 2.47 UIU/ML (ref 0.38–5.33)
UROBILINOGEN UR STRIP.AUTO-MCNC: 0.2 MG/DL
UROBILINOGEN UR STRIP.AUTO-MCNC: 0.2 MG/DL
WBC # BLD AUTO: 2.1 K/UL (ref 4.8–10.8)
WBC # BLD AUTO: 3.1 K/UL (ref 4.8–10.8)
WBC # BLD AUTO: 9.8 K/UL (ref 4.8–10.8)
WBC #/AREA URNS HPF: ABNORMAL /HPF
WBC #/AREA URNS HPF: ABNORMAL /HPF

## 2023-05-08 PROCEDURE — 85025 COMPLETE CBC W/AUTO DIFF WBC: CPT

## 2023-05-08 PROCEDURE — 94799 UNLISTED PULMONARY SVC/PX: CPT

## 2023-05-08 PROCEDURE — 700111 HCHG RX REV CODE 636 W/ 250 OVERRIDE (IP): Performed by: INTERNAL MEDICINE

## 2023-05-08 PROCEDURE — 31645 BRNCHSC W/THER ASPIR 1ST: CPT | Performed by: INTERNAL MEDICINE

## 2023-05-08 PROCEDURE — 5A1945Z RESPIRATORY VENTILATION, 24-96 CONSECUTIVE HOURS: ICD-10-PCS | Performed by: INTERNAL MEDICINE

## 2023-05-08 PROCEDURE — 85027 COMPLETE CBC AUTOMATED: CPT

## 2023-05-08 PROCEDURE — 99152 MOD SED SAME PHYS/QHP 5/>YRS: CPT

## 2023-05-08 PROCEDURE — 700105 HCHG RX REV CODE 258: Performed by: STUDENT IN AN ORGANIZED HEALTH CARE EDUCATION/TRAINING PROGRAM

## 2023-05-08 PROCEDURE — 99292 CRITICAL CARE ADDL 30 MIN: CPT | Mod: 25,GC | Performed by: INTERNAL MEDICINE

## 2023-05-08 PROCEDURE — 93306 TTE W/DOPPLER COMPLETE: CPT | Mod: 26 | Performed by: STUDENT IN AN ORGANIZED HEALTH CARE EDUCATION/TRAINING PROGRAM

## 2023-05-08 PROCEDURE — 83735 ASSAY OF MAGNESIUM: CPT

## 2023-05-08 PROCEDURE — 82803 BLOOD GASES ANY COMBINATION: CPT

## 2023-05-08 PROCEDURE — 0B9F8ZX DRAINAGE OF RIGHT LOWER LUNG LOBE, VIA NATURAL OR ARTIFICIAL OPENING ENDOSCOPIC, DIAGNOSTIC: ICD-10-PCS | Performed by: INTERNAL MEDICINE

## 2023-05-08 PROCEDURE — 94003 VENT MGMT INPAT SUBQ DAY: CPT

## 2023-05-08 PROCEDURE — 36620 INSERTION CATHETER ARTERY: CPT | Performed by: INTERNAL MEDICINE

## 2023-05-08 PROCEDURE — 95822 EEG COMA OR SLEEP ONLY: CPT | Performed by: STUDENT IN AN ORGANIZED HEALTH CARE EDUCATION/TRAINING PROGRAM

## 2023-05-08 PROCEDURE — 81001 URINALYSIS AUTO W/SCOPE: CPT | Mod: 91

## 2023-05-08 PROCEDURE — 770022 HCHG ROOM/CARE - ICU (200)

## 2023-05-08 PROCEDURE — 84484 ASSAY OF TROPONIN QUANT: CPT

## 2023-05-08 PROCEDURE — 4A10X4Z MONITORING OF CENTRAL NERVOUS ELECTRICAL ACTIVITY, EXTERNAL APPROACH: ICD-10-PCS | Performed by: STUDENT IN AN ORGANIZED HEALTH CARE EDUCATION/TRAINING PROGRAM

## 2023-05-08 PROCEDURE — 3E1F88Z IRRIGATION OF RESPIRATORY TRACT USING IRRIGATING SUBSTANCE, VIA NATURAL OR ARTIFICIAL OPENING ENDOSCOPIC: ICD-10-PCS | Performed by: INTERNAL MEDICINE

## 2023-05-08 PROCEDURE — 83605 ASSAY OF LACTIC ACID: CPT

## 2023-05-08 PROCEDURE — 700111 HCHG RX REV CODE 636 W/ 250 OVERRIDE (IP): Performed by: EMERGENCY MEDICINE

## 2023-05-08 PROCEDURE — 84100 ASSAY OF PHOSPHORUS: CPT

## 2023-05-08 PROCEDURE — 700101 HCHG RX REV CODE 250: Performed by: INTERNAL MEDICINE

## 2023-05-08 PROCEDURE — 82550 ASSAY OF CK (CPK): CPT

## 2023-05-08 PROCEDURE — 82947 ASSAY GLUCOSE BLOOD QUANT: CPT

## 2023-05-08 PROCEDURE — C1751 CATH, INF, PER/CENT/MIDLINE: HCPCS

## 2023-05-08 PROCEDURE — 99291 CRITICAL CARE FIRST HOUR: CPT | Mod: 25,GC | Performed by: INTERNAL MEDICINE

## 2023-05-08 PROCEDURE — C9113 INJ PANTOPRAZOLE SODIUM, VIA: HCPCS | Performed by: INTERNAL MEDICINE

## 2023-05-08 PROCEDURE — 84300 ASSAY OF URINE SODIUM: CPT

## 2023-05-08 PROCEDURE — 71045 X-RAY EXAM CHEST 1 VIEW: CPT

## 2023-05-08 PROCEDURE — 82570 ASSAY OF URINE CREATININE: CPT

## 2023-05-08 PROCEDURE — 36620 INSERTION CATHETER ARTERY: CPT

## 2023-05-08 PROCEDURE — 87040 BLOOD CULTURE FOR BACTERIA: CPT

## 2023-05-08 PROCEDURE — 93306 TTE W/DOPPLER COMPLETE: CPT

## 2023-05-08 PROCEDURE — 70450 CT HEAD/BRAIN W/O DYE: CPT

## 2023-05-08 PROCEDURE — 03HY32Z INSERTION OF MONITORING DEVICE INTO UPPER ARTERY, PERCUTANEOUS APPROACH: ICD-10-PCS | Performed by: INTERNAL MEDICINE

## 2023-05-08 PROCEDURE — 80307 DRUG TEST PRSMV CHEM ANLYZR: CPT

## 2023-05-08 PROCEDURE — 36556 INSERT NON-TUNNEL CV CATH: CPT

## 2023-05-08 PROCEDURE — 700105 HCHG RX REV CODE 258: Performed by: INTERNAL MEDICINE

## 2023-05-08 PROCEDURE — 87070 CULTURE OTHR SPECIMN AEROBIC: CPT

## 2023-05-08 PROCEDURE — 31624 DX BRONCHOSCOPE/LAVAGE: CPT | Mod: 51 | Performed by: INTERNAL MEDICINE

## 2023-05-08 PROCEDURE — A9270 NON-COVERED ITEM OR SERVICE: HCPCS | Performed by: STUDENT IN AN ORGANIZED HEALTH CARE EDUCATION/TRAINING PROGRAM

## 2023-05-08 PROCEDURE — 82962 GLUCOSE BLOOD TEST: CPT | Mod: 91

## 2023-05-08 PROCEDURE — HZ2ZZZZ DETOXIFICATION SERVICES FOR SUBSTANCE ABUSE TREATMENT: ICD-10-PCS | Performed by: INTERNAL MEDICINE

## 2023-05-08 PROCEDURE — 84132 ASSAY OF SERUM POTASSIUM: CPT

## 2023-05-08 PROCEDURE — 37799 UNLISTED PX VASCULAR SURGERY: CPT

## 2023-05-08 PROCEDURE — 700101 HCHG RX REV CODE 250: Performed by: STUDENT IN AN ORGANIZED HEALTH CARE EDUCATION/TRAINING PROGRAM

## 2023-05-08 PROCEDURE — 02HV33Z INSERTION OF INFUSION DEVICE INTO SUPERIOR VENA CAVA, PERCUTANEOUS APPROACH: ICD-10-PCS | Performed by: INTERNAL MEDICINE

## 2023-05-08 PROCEDURE — 82140 ASSAY OF AMMONIA: CPT

## 2023-05-08 PROCEDURE — 95822 EEG COMA OR SLEEP ONLY: CPT | Mod: 26 | Performed by: STUDENT IN AN ORGANIZED HEALTH CARE EDUCATION/TRAINING PROGRAM

## 2023-05-08 PROCEDURE — 302977 HCHG BRONCHOSCOPY PROC-THERAPEUTIC

## 2023-05-08 PROCEDURE — 700102 HCHG RX REV CODE 250 W/ 637 OVERRIDE(OP): Performed by: INTERNAL MEDICINE

## 2023-05-08 PROCEDURE — 700111 HCHG RX REV CODE 636 W/ 250 OVERRIDE (IP)

## 2023-05-08 PROCEDURE — 700102 HCHG RX REV CODE 250 W/ 637 OVERRIDE(OP): Performed by: STUDENT IN AN ORGANIZED HEALTH CARE EDUCATION/TRAINING PROGRAM

## 2023-05-08 PROCEDURE — 87205 SMEAR GRAM STAIN: CPT

## 2023-05-08 PROCEDURE — 80048 BASIC METABOLIC PNL TOTAL CA: CPT

## 2023-05-08 PROCEDURE — 87086 URINE CULTURE/COLONY COUNT: CPT

## 2023-05-08 PROCEDURE — 36556 INSERT NON-TUNNEL CV CATH: CPT | Mod: RT,GC,51 | Performed by: INTERNAL MEDICINE

## 2023-05-08 PROCEDURE — 83935 ASSAY OF URINE OSMOLALITY: CPT

## 2023-05-08 RX ORDER — MAGNESIUM SULFATE HEPTAHYDRATE 40 MG/ML
2 INJECTION, SOLUTION INTRAVENOUS
Status: COMPLETED | OUTPATIENT
Start: 2023-05-08 | End: 2023-05-09

## 2023-05-08 RX ORDER — IPRATROPIUM BROMIDE AND ALBUTEROL SULFATE 2.5; .5 MG/3ML; MG/3ML
3 SOLUTION RESPIRATORY (INHALATION)
Status: DISCONTINUED | OUTPATIENT
Start: 2023-05-08 | End: 2023-05-16 | Stop reason: HOSPADM

## 2023-05-08 RX ORDER — MAGNESIUM SULFATE HEPTAHYDRATE 40 MG/ML
4 INJECTION, SOLUTION INTRAVENOUS
Status: COMPLETED | OUTPATIENT
Start: 2023-05-08 | End: 2023-05-09

## 2023-05-08 RX ORDER — POTASSIUM CHLORIDE 7.45 MG/ML
10 INJECTION INTRAVENOUS ONCE
Status: DISCONTINUED | OUTPATIENT
Start: 2023-05-08 | End: 2023-05-08

## 2023-05-08 RX ORDER — POLYETHYLENE GLYCOL 3350 17 G/17G
1 POWDER, FOR SOLUTION ORAL
Status: DISCONTINUED | OUTPATIENT
Start: 2023-05-08 | End: 2023-05-08

## 2023-05-08 RX ORDER — AMOXICILLIN 250 MG
2 CAPSULE ORAL 2 TIMES DAILY
Status: DISCONTINUED | OUTPATIENT
Start: 2023-05-08 | End: 2023-05-08

## 2023-05-08 RX ORDER — DEXTROSE AND SODIUM CHLORIDE 5; .9 G/100ML; G/100ML
INJECTION, SOLUTION INTRAVENOUS CONTINUOUS
Status: DISCONTINUED | OUTPATIENT
Start: 2023-05-08 | End: 2023-05-09

## 2023-05-08 RX ORDER — SODIUM CHLORIDE 9 MG/ML
INJECTION, SOLUTION INTRAVENOUS CONTINUOUS
Status: DISCONTINUED | OUTPATIENT
Start: 2023-05-08 | End: 2023-05-08

## 2023-05-08 RX ORDER — MAGNESIUM SULFATE HEPTAHYDRATE 40 MG/ML
.5-2 INJECTION, SOLUTION INTRAVENOUS CONTINUOUS
Status: DISCONTINUED | OUTPATIENT
Start: 2023-05-08 | End: 2023-05-10

## 2023-05-08 RX ORDER — DEXTROSE AND SODIUM CHLORIDE 10; .45 G/100ML; G/100ML
INJECTION, SOLUTION INTRAVENOUS CONTINUOUS
Status: DISCONTINUED | OUTPATIENT
Start: 2023-05-08 | End: 2023-05-09

## 2023-05-08 RX ORDER — LEVETIRACETAM 500 MG/5ML
1000 INJECTION, SOLUTION, CONCENTRATE INTRAVENOUS ONCE
Status: COMPLETED | OUTPATIENT
Start: 2023-05-08 | End: 2023-05-08

## 2023-05-08 RX ORDER — POTASSIUM CHLORIDE 7.45 MG/ML
10 INJECTION INTRAVENOUS ONCE
Status: COMPLETED | OUTPATIENT
Start: 2023-05-08 | End: 2023-05-08

## 2023-05-08 RX ORDER — LEVETIRACETAM 500 MG/5ML
1000 INJECTION, SOLUTION, CONCENTRATE INTRAVENOUS EVERY 12 HOURS
Status: DISCONTINUED | OUTPATIENT
Start: 2023-05-08 | End: 2023-05-09

## 2023-05-08 RX ORDER — POTASSIUM CHLORIDE 14.9 MG/ML
20 INJECTION INTRAVENOUS ONCE
Status: COMPLETED | OUTPATIENT
Start: 2023-05-08 | End: 2023-05-08

## 2023-05-08 RX ORDER — AMOXICILLIN 250 MG
2 CAPSULE ORAL 2 TIMES DAILY
Status: DISCONTINUED | OUTPATIENT
Start: 2023-05-08 | End: 2023-05-13

## 2023-05-08 RX ORDER — NOREPINEPHRINE BITARTRATE 0.03 MG/ML
0-1 INJECTION, SOLUTION INTRAVENOUS CONTINUOUS
Status: DISCONTINUED | OUTPATIENT
Start: 2023-05-08 | End: 2023-05-11

## 2023-05-08 RX ORDER — BISACODYL 10 MG
10 SUPPOSITORY, RECTAL RECTAL
Status: DISCONTINUED | OUTPATIENT
Start: 2023-05-08 | End: 2023-05-08

## 2023-05-08 RX ORDER — MIDAZOLAM HYDROCHLORIDE 1 MG/ML
5 INJECTION INTRAMUSCULAR; INTRAVENOUS ONCE
Status: COMPLETED | OUTPATIENT
Start: 2023-05-08 | End: 2023-05-08

## 2023-05-08 RX ORDER — SODIUM CHLORIDE 9 MG/ML
INJECTION, SOLUTION INTRAVENOUS CONTINUOUS
Status: DISCONTINUED | OUTPATIENT
Start: 2023-05-08 | End: 2023-05-09

## 2023-05-08 RX ORDER — GAUZE BANDAGE 2" X 2"
100 BANDAGE TOPICAL DAILY
Status: DISCONTINUED | OUTPATIENT
Start: 2023-05-09 | End: 2023-05-08

## 2023-05-08 RX ORDER — DEXMEDETOMIDINE HYDROCHLORIDE 4 UG/ML
.1-1.5 INJECTION, SOLUTION INTRAVENOUS CONTINUOUS
Status: DISCONTINUED | OUTPATIENT
Start: 2023-05-08 | End: 2023-05-09

## 2023-05-08 RX ORDER — POLYETHYLENE GLYCOL 3350 17 G/17G
1 POWDER, FOR SOLUTION ORAL
Status: DISCONTINUED | OUTPATIENT
Start: 2023-05-08 | End: 2023-05-13

## 2023-05-08 RX ORDER — BISACODYL 10 MG
10 SUPPOSITORY, RECTAL RECTAL
Status: DISCONTINUED | OUTPATIENT
Start: 2023-05-08 | End: 2023-05-13

## 2023-05-08 RX ORDER — MIDAZOLAM HYDROCHLORIDE 1 MG/ML
INJECTION INTRAMUSCULAR; INTRAVENOUS
Status: COMPLETED
Start: 2023-05-08 | End: 2023-05-08

## 2023-05-08 RX ORDER — PANTOPRAZOLE SODIUM 40 MG/10ML
40 INJECTION, POWDER, LYOPHILIZED, FOR SOLUTION INTRAVENOUS 2 TIMES DAILY
Status: DISCONTINUED | OUTPATIENT
Start: 2023-05-08 | End: 2023-05-10

## 2023-05-08 RX ORDER — SODIUM CHLORIDE, SODIUM LACTATE, POTASSIUM CHLORIDE, AND CALCIUM CHLORIDE .6; .31; .03; .02 G/100ML; G/100ML; G/100ML; G/100ML
250 INJECTION, SOLUTION INTRAVENOUS ONCE
Status: DISCONTINUED | OUTPATIENT
Start: 2023-05-08 | End: 2023-05-08

## 2023-05-08 RX ORDER — ACETAMINOPHEN 500 MG
1000 TABLET ORAL EVERY 6 HOURS
Status: DISCONTINUED | OUTPATIENT
Start: 2023-05-08 | End: 2023-05-08

## 2023-05-08 RX ORDER — FOLIC ACID 1 MG/1
1 TABLET ORAL DAILY
Status: COMPLETED | OUTPATIENT
Start: 2023-05-09 | End: 2023-05-12

## 2023-05-08 RX ORDER — ACETAMINOPHEN 650 MG/1
1000 SUPPOSITORY RECTAL EVERY 6 HOURS
Status: DISCONTINUED | OUTPATIENT
Start: 2023-05-08 | End: 2023-05-08

## 2023-05-08 RX ADMIN — POTASSIUM CHLORIDE 20 MEQ: 14.9 INJECTION, SOLUTION INTRAVENOUS at 06:48

## 2023-05-08 RX ADMIN — NOREPINEPHRINE BITARTRATE 0.4 MCG/KG/MIN: 1 INJECTION, SOLUTION, CONCENTRATE INTRAVENOUS at 01:01

## 2023-05-08 RX ADMIN — SODIUM CHLORIDE: 9 INJECTION, SOLUTION INTRAVENOUS at 10:52

## 2023-05-08 RX ADMIN — VASOPRESSIN 0.03 UNITS/MIN: 20 INJECTION INTRAVENOUS at 02:59

## 2023-05-08 RX ADMIN — DEXMEDETOMIDINE 0.2 MCG/KG/HR: 200 INJECTION, SOLUTION INTRAVENOUS at 11:23

## 2023-05-08 RX ADMIN — SODIUM CHLORIDE 3 UNITS/HR: 9 INJECTION, SOLUTION INTRAVENOUS at 02:55

## 2023-05-08 RX ADMIN — SODIUM PHOSPHATE, MONOBASIC, MONOHYDRATE AND SODIUM PHOSPHATE, DIBASIC, ANHYDROUS 30 MMOL: 276; 142 INJECTION, SOLUTION INTRAVENOUS at 19:05

## 2023-05-08 RX ADMIN — THIAMINE HYDROCHLORIDE 100 MG: 100 INJECTION, SOLUTION INTRAMUSCULAR; INTRAVENOUS at 09:05

## 2023-05-08 RX ADMIN — FENTANYL CITRATE 50 MCG: 50 INJECTION INTRAMUSCULAR; INTRAVENOUS at 23:29

## 2023-05-08 RX ADMIN — SODIUM BICARBONATE 50 MEQ: 84 INJECTION, SOLUTION INTRAVENOUS at 02:11

## 2023-05-08 RX ADMIN — POTASSIUM CHLORIDE 20 MEQ: 14.9 INJECTION, SOLUTION INTRAVENOUS at 11:21

## 2023-05-08 RX ADMIN — VASOPRESSIN 0.03 UNITS/MIN: 20 INJECTION INTRAVENOUS at 13:02

## 2023-05-08 RX ADMIN — AMPICILLIN SODIUM AND SULBACTAM SODIUM 3 G: 2; 1 INJECTION, POWDER, FOR SOLUTION INTRAMUSCULAR; INTRAVENOUS at 12:57

## 2023-05-08 RX ADMIN — SODIUM CHLORIDE: 9 INJECTION, SOLUTION INTRAVENOUS at 01:40

## 2023-05-08 RX ADMIN — Medication 50 MEQ: at 02:11

## 2023-05-08 RX ADMIN — SODIUM CHLORIDE 13 UNITS/HR: 9 INJECTION, SOLUTION INTRAVENOUS at 12:14

## 2023-05-08 RX ADMIN — DEXTROSE AND SODIUM CHLORIDE: 10; .45 INJECTION, SOLUTION INTRAVENOUS at 15:32

## 2023-05-08 RX ADMIN — PANTOPRAZOLE SODIUM 40 MG: 40 INJECTION, POWDER, FOR SOLUTION INTRAVENOUS at 18:00

## 2023-05-08 RX ADMIN — NOREPINEPHRINE BITARTRATE 0.25 MCG/KG/MIN: 1 INJECTION, SOLUTION, CONCENTRATE INTRAVENOUS at 07:33

## 2023-05-08 RX ADMIN — MIDAZOLAM HYDROCHLORIDE 5 MG: 1 INJECTION, SOLUTION INTRAMUSCULAR; INTRAVENOUS at 01:32

## 2023-05-08 RX ADMIN — VASOPRESSIN 0.03 UNITS/MIN: 20 INJECTION INTRAVENOUS at 23:23

## 2023-05-08 RX ADMIN — NOREPINEPHRINE BITARTRATE 0.95 MCG/KG/MIN: 1 INJECTION, SOLUTION, CONCENTRATE INTRAVENOUS at 20:52

## 2023-05-08 RX ADMIN — POTASSIUM CHLORIDE 10 MEQ: 7.46 INJECTION, SOLUTION INTRAVENOUS at 14:26

## 2023-05-08 RX ADMIN — LEVETIRACETAM 1000 MG: 100 INJECTION, SOLUTION INTRAVENOUS at 04:24

## 2023-05-08 RX ADMIN — SODIUM CHLORIDE 1000 ML: 9 INJECTION, SOLUTION INTRAVENOUS at 00:56

## 2023-05-08 RX ADMIN — MIDAZOLAM HYDROCHLORIDE 5 MG: 1 INJECTION INTRAMUSCULAR; INTRAVENOUS at 01:32

## 2023-05-08 RX ADMIN — POTASSIUM CHLORIDE 10 MEQ: 7.46 INJECTION, SOLUTION INTRAVENOUS at 22:33

## 2023-05-08 RX ADMIN — THIAMINE HYDROCHLORIDE: 100 INJECTION, SOLUTION INTRAMUSCULAR; INTRAVENOUS at 03:00

## 2023-05-08 RX ADMIN — CEFTRIAXONE SODIUM 2000 MG: 2 INJECTION, POWDER, FOR SOLUTION INTRAMUSCULAR; INTRAVENOUS at 00:33

## 2023-05-08 RX ADMIN — NOREPINEPHRINE BITARTRATE 0.25 MCG/KG/MIN: 1 INJECTION, SOLUTION, CONCENTRATE INTRAVENOUS at 17:07

## 2023-05-08 RX ADMIN — DEXMEDETOMIDINE 0.8 MCG/KG/HR: 200 INJECTION, SOLUTION INTRAVENOUS at 19:57

## 2023-05-08 RX ADMIN — SENNOSIDES AND DOCUSATE SODIUM 2 TABLET: 50; 8.6 TABLET ORAL at 05:22

## 2023-05-08 RX ADMIN — POTASSIUM CHLORIDE 10 MEQ: 7.46 INJECTION, SOLUTION INTRAVENOUS at 18:28

## 2023-05-08 RX ADMIN — DEXTROSE AND SODIUM CHLORIDE: 5; 900 INJECTION, SOLUTION INTRAVENOUS at 13:24

## 2023-05-08 RX ADMIN — NOREPINEPHRINE BITARTRATE 0.9 MCG/KG/MIN: 1 INJECTION, SOLUTION, CONCENTRATE INTRAVENOUS at 23:28

## 2023-05-08 RX ADMIN — PANTOPRAZOLE SODIUM 40 MG: 40 INJECTION, POWDER, FOR SOLUTION INTRAVENOUS at 05:22

## 2023-05-08 RX ADMIN — LEVETIRACETAM 1000 MG: 100 INJECTION, SOLUTION INTRAVENOUS at 05:38

## 2023-05-08 RX ADMIN — PROPOFOL 5 MCG/KG/MIN: 10 INJECTION, EMULSION INTRAVENOUS at 01:22

## 2023-05-08 RX ADMIN — FENTANYL CITRATE 50 MCG: 50 INJECTION INTRAMUSCULAR; INTRAVENOUS at 21:04

## 2023-05-08 RX ADMIN — LEVETIRACETAM 1000 MG: 100 INJECTION, SOLUTION INTRAVENOUS at 18:00

## 2023-05-08 RX ADMIN — AMPICILLIN SODIUM AND SULBACTAM SODIUM 3 G: 2; 1 INJECTION, POWDER, FOR SOLUTION INTRAMUSCULAR; INTRAVENOUS at 18:21

## 2023-05-08 RX ADMIN — POTASSIUM CHLORIDE 20 MEQ: 14.9 INJECTION, SOLUTION INTRAVENOUS at 02:28

## 2023-05-08 RX ADMIN — MAGNESIUM SULFATE HEPTAHYDRATE 2 G: 2 INJECTION, SOLUTION INTRAVENOUS at 22:00

## 2023-05-08 ASSESSMENT — FIBROSIS 4 INDEX: FIB4 SCORE: 5.95

## 2023-05-08 ASSESSMENT — PAIN DESCRIPTION - PAIN TYPE
TYPE: ACUTE PAIN

## 2023-05-08 NOTE — ASSESSMENT & PLAN NOTE
Creatinine of 2.2 at outside hospital unsure baseline, creatinine clearance of 50.4  - Most likely ATN versus prerenal in etiology given recent arrest  - Continue to monitor on BMP  - Fluid resuscitation  - Avoid nephrotoxic agents, renally dose medications as appropriate

## 2023-05-08 NOTE — PROGRESS NOTES
4 Eyes Skin Assessment Completed by ROEL Salgado and ROEL Patricio.    Head WDL  Ears Redness and Blanching  Nose WDL  Mouth /lips: bleeding  Neck WDL  Breast/Chest WDL  Shoulder Blades WDL  Spine WDL  (R) Arm/Elbow/Hand Redness and blanching   (L) Arm/Elbow/Hand Redness and blanching  Abdomen WDL  Groin WDL  Scrotum/Coccyx/Buttocks Redness and Blanching  (R) Leg WDL  (L) Leg WDL  (R) Heel/Foot/Toe : Ankle redness and slow to ana  (L) Heel/Foot/Toe Redness and Blanching          Devices In Places Blood Pressure Cuff, Pulse Ox, Ferraro, Arterial Line, SCD's, ET Tube, and Central Line monitor leads.       Interventions In Place Heel Mepilex, Sacral Mepilex, Q2 Turns, Low Air Loss Mattress, and Heels Loaded W/Pillows    Possible Skin Injury No    Pictures Uploaded Into Epic N/A  Wound Consult Placed N/A  RN Wound Prevention Protocol Ordered No

## 2023-05-08 NOTE — ASSESSMENT & PLAN NOTE
RESOLVED.    Reason for DKA: Differential remains broad nausea/vomiting, seizure, antipsychotic?,  Lipase within normal limits at outside hospital less likely pancreatitis. Beta hydroxybutyrate elevated (>8).  - DKA protocol initiated, treated appropriately  - Diabetes education

## 2023-05-08 NOTE — ASSESSMENT & PLAN NOTE
RESOLVING    - Intubated 5/7 at outside hospital; extubated 5/10, on HFNC, titrating prn  - Chest x-ray bilateral basilar opacities  - Likely aspiration pneumonia - on Unasyn  - Goal oxygen saturation >88%, paO2 >60  - Early mobility protocol
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
## 2023-05-08 NOTE — PROCEDURES
Date: 5/8/2023    Procedure: Central Venous Catheter Insertion    Indication: Postcardiac arrest    Physician:  Matt Rodriguez M.D., Ilya Kendall M.D.     Consent: Emergent procedure patient unable to consent.    Procedure:  A time out occurred with the patient name, medical record number, allergies, and consent with right procedure and indication with bedside nurse and if able patient. The right internal jugular vein was prepped and draped using sterile barrier precautions.  Using ultrasound guided seldinger technique the vein was access and confirmed after 1 attempt. Venous appearing blood return was noted in the catheter. All the ports were flushed using sterile saline and capped. The catheter was sutured in place and a sterile dressing was applied.  No immediate complications.  A chest xray was ordered and confirmed satisfactory placement with no signs to suggest pneumothorax.       Matt Rodriguez MD

## 2023-05-08 NOTE — ASSESSMENT & PLAN NOTE
AST greater than ALT, mild transaminitis, history of chronic liver dysfunction likely secondary to EtOH use, INR elevated 1.93, no signs or sequelae of cirrhosis  - Trend on CMP

## 2023-05-08 NOTE — CARE PLAN
The patient is Watcher - Medium risk of patient condition declining or worsening    Shift Goals  Clinical Goals: Decrease blood glucose, maintain hemodynamics, MAP >65 or SBP >90  Patient Goals: CELINA  Family Goals: CELINA    Progress made toward(s) clinical / shift goals:  Maintained MAP >65. Decreased blood glucose from 739 to 116 during shift.    Patient is not progressing towards the following goals:      Problem: Hemodynamics  Goal: Patient's hemodynamics, fluid balance and neurologic status will be stable or improve  Outcome: Progressing     Problem: Respiratory  Goal: Patient will achieve/maintain optimum respiratory ventilation and gas exchange  Outcome: Progressing

## 2023-05-08 NOTE — ASSESSMENT & PLAN NOTE
RESOLVED.    - CPK >11k on admission; down trended appropriately  - Continue IV fluids as necessary

## 2023-05-08 NOTE — ASSESSMENT & PLAN NOTE
History of liver failure believed to be 2/2 alcoholic hepatitis. RUQ US from 2019 shows echogenic liver 2/2 fatty liver infiltrates vs fibrosis; smooth liver contour, no mention of cirrhosis.  - Mild transaminitis  - Bilirubin not elevated  - EtOH negative  - INR- 1.93, ammonia - 56 on admission  - CTM

## 2023-05-08 NOTE — PROGRESS NOTES
Patient to [facility] room 624 with ACLS RN, RN, and RT  Arrived to room at 0110 on 0.4mcg/kg/min levo    Tachypnea at 50, hypothermic 32.1 C, unequal pupils, hypotensive, MD at bedside to place orders, central line art line, and possible bronch.   Propofol started and titrated up per MD orders.   5mg versed for patient sedation for line placement, pt reaching for ET tube, not following commands.    1 amp bicarb given 0200  0206 Time out for central line

## 2023-05-08 NOTE — ASSESSMENT & PLAN NOTE
Patient with a history of seizure on Keppra 500 mg as outpatient, history of EtOH use EtOH negative at outside hospital possibly hematemesis with hyponatremia precipitating seizure then cardiac arrest?  - 1 g Keppra load at outside hospital  - restart home keppra dose  - Correct underlying electrolyte abnormalities

## 2023-05-08 NOTE — PROCEDURES
"Date of Service: 5/8/2023    Procedure:  Diagnostic and therapeutic bronchoscopy with BAL    Indication: Respiratory failure, cardiac arrest, aspiration pneumonia    Physician:  Dr. Ilya Kendall MD    Post Procedure Diagnosis:  1.  Inflamed airways with copious amount of aspirated dark brown/black GI contents  2.  Multiple airway segments therapeutically lavaged with small aliquots of saline  3.  BAL right lower lobe sent for culture    Narrative:  Appropriate consent was obtained and \"time out\" was performed.  A flexible fiberoptic bronchoscope was then inserted through the ETT without difficulty.  All airways were evaluated to the sub-segmental level.  The airway mucosa was inflamed.  There was a large amount of aspirated GI contents, dark brown/black consistent with coffee-ground emesis.  No endobronchial lesions were seen.  Multiple airway segments including the right upper lobe, right middle lobe, right lower lobe, left upper lobe and lower lobe airway segments were therapeutically lavaged with small aliquots of saline removing aspirated GI contents.  The bronchoscope was then wedged in a segment of the RLL bronchus.  40 cc of saline was instilled with moderate return of dark brown BAL fluid.  The BAL specimen will be sent for appropriate culture.  No immediate complications.  EBL = 0.      Ilya Kendall M.D.      "

## 2023-05-08 NOTE — DIETARY
Nutrition Services:    RD alerted for Pt BMI <19 as well as Diabetes diet education. Pt currently intubated as Pt critically ill. Per MD note, Pt with large amount of aspirated GI contents. Pt not appropriate for interview and diabetes diet education at this time.    RD following.

## 2023-05-08 NOTE — ASSESSMENT & PLAN NOTE
OH cardiac arrest, per chart review, down for approximately 45 minutes before ROSC achieved. Unclear etiology of cardiac arrest - electrolyte abnormality vs cardiac vs drug use? Troponin T mildly elevated at 40; EKG demonstrates ST elevation in V3-V5 - may be 2/2 seizure/electrolyte abnormalities. Echo demonstrates LVEF 60%; normal RV systolic function, no valvular abnormalities.    - Patient extubated 5/10/23, awake, speaking and answering appropriately  - Off pressor support  - CTM with CBC/CMP  - Maintain normoxia, euthermia, euglycemia  - Monitor neuro status, consider repeat head CT if with neurological changes

## 2023-05-08 NOTE — ED NOTES
[facility] from Lab called with critical result of C02 -7,Glucose - 553 , Na+ -110 at 0047. Critical lab result read back to [facility].   Dr. Kendall notified of critical lab result at 0050.  Critical lab result read back by Dr. Kendall.

## 2023-05-08 NOTE — ED NOTES
Unable to address med rec. Patient unable to participate in interview (intubated). Unable to reach patient's significant other at phone number listed in emergency contacts ([phone]); call does not connect due to busy signal. Pharmacies on file are [facility] in [place] ([phone]) and [facility] in [place] ([phone]), which are both closed at this time; both will be open at 09:00.

## 2023-05-08 NOTE — ASSESSMENT & PLAN NOTE
RESOLVED.    Lactic acid of 10 at outside hospital, down trending appropriately  - continue fluids per DKA protocol  - Does not appear to be acutely septic, chest x-ray with some concerns for opacities however likely secondary to aspiration pneumonia no other signs and symptoms of infection  - Given ceftriaxone in ED; continue on unasyn  - follow up cultures - NGTD on blood/urine/BAL

## 2023-05-08 NOTE — ED NOTES
[facility] from Lab called with critical result of Lactic Acid -4.9 at 0025. Critical lab result read back to [facility].   Dr. Manning notified of critical lab result at 0026.  Critical lab result read back by Dr. Manning.

## 2023-05-08 NOTE — CARE PLAN
Problem: Ventilation  Goal: Ability to achieve and maintain unassisted ventilation or tolerate decreased levels of ventilator support  Description: Target End Date:  4 days     Document on Vent flowsheet    1.  Support and monitor invasive and noninvasive mechanical ventilation  2.  Monitor ventilator weaning response  3.  Perform ventilator associated pneumonia prevention interventions  4.  Manage ventilation therapy by monitoring diagnostic test results  Outcome: Not met     Vent Day # 2     Ventilator settings changed this shift: APV 28 450 +8     Weaning trials: N/A     Respiratory Procedures: Diagnostic and therapeutic bronchoscopy with BAL     Plan: Continue current ventilator settings and wean mechanical ventilation as tolerated per physician orders

## 2023-05-08 NOTE — H&P
Critical Care/Pulmonary Consultation    Date of Service: 5/7/2023    Date of Admission:  5/7/2023 11:08 PM    Consulting Physician: Michelle Manning D.O.    Chief Complaint:  Seizure      History of Present Illness: Cory Marmolejo is a 37 y.o. male with a past medical history of TBI, seizure disorder, EtOH use disorder, chronic liver disease, type 2 diabetes mellitus requiring insulin who presented as a witness seizure near [facility],  when ambulance arrived patient was found to be pulseless and unresponsive, CPR was started (BLS) and continued for approximately 45 minutes before arriving at [facility].  Patient also reportedly had hematemesis at this time.  Patient then received epi x2 with ROSC unknown rhythm, before supportive measures were began.  Patient received 1 L of LR 1 g of Keppra load in the outside hospital in addition to 1 g of Rocephin and 50 mEq of sodium bicarb.  Patient on examination was noticed to have blown pupils bilaterally, CT head negative at outside facility.  Patient was then given 2 mg of Versed, basic laboratory work began before he was care flighted to our facility.  Patient arrives intubated, unresponsive though with pupils equal and reactive to light though left greater than right by 1 mm.  Patient originally transported with norepinephrine running however hemodynamically stable with a temperature of 28.1 °C, pulse of 91, blood pressure of 96/54, and an oxygen saturation of 98%.  Patient is on mechanical ventilator APVC 60%, 8, 440.  Unable to question patient secondary to critical illness.    At the outside hospital labs were remarkable for pH of 6.8 on initial ABG, BMP with sodium of 111, potassium of 5.3, chloride of 72 bicarb of 8, anion gap of 35, blood glucose of 639 serum ketones negative EtOH of 10 (within normal limits), CPK of 9429, UDS negative, AST of 228, ALT of 105 total bilirubin of 0.7, alk phos of 162.  Lactic acid was 10, INR was 1.7.  Patient given another 1 L bolus  of LR in the emergency department as well as 1 amp of bicarb.  Patient is critically ill status post arrest requiring ICU level care.    Review of Systems   Unable to perform ROS: Acuity of condition     Home Medications    **Home medications have not yet been reviewed for this encounter**         Social History     Tobacco Use    Smoking status: Never    Smokeless tobacco: Never   Substance Use Topics    Alcohol use: Yes     Comment: 1/3 of a half gallon a day of vodka    Drug use: Yes     Types: Marijuana, Inhaled     Comment: THC        Past Medical History:   Diagnosis Date    Alcohol dependence (HCC)        Past Surgical History:   Procedure Laterality Date    CRANIOTOMY         Allergies: Tylenol    Unable to obtain family history secondary to patient unresponsive, intubated.  Family history per chart reviewed not pertinent to current medical condition.    Vitals:    05/07/23 2315 05/07/23 2316 05/07/23 2318   Weight: 60 kg (132 lb 4.4 oz)     Weight % change since last entry.: 0 %     BP:  121/83    Pulse:  84 84   Resp:   (!) 28       Physical Examination  General: Normal cephalic atraumatic, cold pale appearing male in acute distress.  Neuro/Psych: Left greater than right pupil (4 versus 3 mm) both reactive to light, unable to test further cranial nerves.  No withdrawal to pain.  Negative clonus.  HEENT: Normocephalic atraumatic, black blood-tinged fluid around mouth, ET tube in place.  CVS: Regular rate and rhythm, no murmurs gallops or rubs, no JVD  Respiratory: Lungs with bibasilar crackles, mechanical breath sounds no rales, rhonchi, wheezing or stridor.  Abdomen/: Soft, nontender, nondistended, no bruising normal bowel sounds, no HSM.  Extremities: No purposeful movement of extremities, normal muscle tone, negative clonus.  Skin: Cold, pale, capillary refill greater than 3 seconds.    No intake or output data in the 24 hours ending 05/07/23 2332        No results for input(s): SODIUM, POTASSIUM,  CHLORIDE, CO2, BUN, CREATININE, MAGNESIUM, PHOSPHORUS, CALCIUM in the last 72 hours.  No results for input(s): ALTSGPT, ASTSGOT, ALKPHOSPHAT, TBILIRUBIN, DBILIRUBIN, GAMMAGT, AMYLASE, LIPASE, ALB, PREALBUMIN, GLUCOSE in the last 72 hours.      OUTSIDE IMAGES-CT HEAD   Final Result      OUTSIDE IMAGES-CT HEAD   Final Result      OUTSIDE IMAGES-DX CHEST   Final Result      DX-CHEST-PORTABLE (1 VIEW)    (Results Pending)       Patient Active Problem List   Diagnosis    Seizure disorder (HCC)    Transaminitis    Hyponatremia    Marijuana use    Chronic liver failure (HCC)    Severe protein-calorie malnutrition (HCC)       Assessment and Plan:  Acute respiratory failure with hypoxia ([facility])  Plan:   Intubated 5/7 at outside hospital  Chest x-ray bilateral basilar opacities  Likely aspiration pneumonia  Vent bundle  ABCDEF  Daily SAT. Target goal RASS +1 to -1  Daily SBT.   Goal oxygen saturation >88%, paO2 >60  Early mobility protocol    Cardiac arrest ([facility])  Will search out etiology of arrest with high suspicion for a cardiac etiology.   Troponin of 40 high-sensitivity at outside hospital within normal limits, EKG with slight elevation in V3 V4, low concern for cardiac etiology likely secondary to seizure, electrolyte abnormalities.  No history of heart failure per chart, will obtain bedside echocardiogram.   Will maintain normoxia and avoid hyperoxia in the early arrest period and maintain eucapnea.   MAP>65-70  Avoid hyperthermia with normothermia protocol if GCS <8 (consider hypothermia to 32 if considerable shock or prolonged resuscitation and will treat/prevent shivering)   Maintain euglycemia.   Repeat CT head  Will monitor neuro status and get EEG (5-20% w/ seizure)  Will continue to monitor and prognosticate 72hrs+ post ROSC or 72hrs post rewarming and rule out drugs metabolism that could cause confusing picture.   Neuron specific enolase:24,48,72 hours post arrest >33 poor neurologic outcome with persistent coma.    Consider SSEP and N20, MRI day 3-5   CBC, CMP, magnesium, phosphorus, TSH       Transaminitis  AST greater than ALT, mild transaminitis, history of chronic liver dysfunction likely secondary to EtOH use, INR elevated 1.7, no signs or sequelae of cirrhosis  - Trend on CMP  - 7 days ceftriaxone given hematemesis    TBI (traumatic brain injury) ([facility])  History of multiple TBI's and craniotomy in the past  - Continue to monitor clinically for recovery  -Rest as per seizure    Seizure ([facility])  Patient with a history of seizure on Keppra 500 mg?  As outpatient, history of EtOH use EtOH negative at outside hospital possibly hematemesis with hyponatremia precipitating seizure then cardiac arrest?  - 1 g Keppra load at outside hospital  - Reconcile medications started on Keppra through NG tube once placed in a.m.  -Correct underlying electrolyte abnormalities      Lactic acidosis  Lactic acid of 10 at outside hospital, decreasing to approximately 4 at our hospital status post resuscitation.  - Continue to monitor  -Further work-up for other endorgan damage  -Does not appear to be acutely septic, chest x-ray with some concerns for opacities however likely secondary to aspiration pneumonia no other signs and symptoms of infection  - Given ceftriaxone in ED    Hyperglycemia  Blood glucose in 600s at outside hospital, cannot rule out DKA  - Beta hydroxybutyrate, repeat CMP, UA, serum osms       High anion gap metabolic acidosis  Patient with high anion gap metabolic acidosis  Differential remains broad however DKA is suspected versus postcardiac arrest  - Work-up as per rule out DKA as above.  - Continue to explore other Gold Richard etiologies as further work-up becomes available  -Start DKA protocol reevaluate his labs return    Hematemesis  Reported hematemesis and dried blood around mouth and nares bilaterally,  - Trend CBC  - IV PPI twice daily  - Depending on functional status, hemoglobin drop consult GI  - History of  alcoholic hepatitis per chart?,  No histories of varices however no EGD found in chart  - Intubated to protect airway    Chronic liver failure (HCC)  History of liver failure?  - Mild transaminitis  - Bilirubin not elevated  - EtOH negative  - CMP, INR, ammonia    LEIGH (acute kidney injury) (HCC)  Creatinine of 2.2 at outside hospital unsure baseline, creatinine clearance of 50.4  Most likely ATN versus prerenal in etiology given recent arrest  Continue to monitor on BMP  Fluid resuscitation      Hyponatremia  Sodium of 111 at outside hospital correct sodium 122, possible etiology of seizure?  Given hematemesis possibly hypovolemic hyponatremia versus SIADH from TBI's?  Versus beer Potomania?  Goal sodium corrected replete no more by 8 in 24 hours  Fluid resuscitation  BMP every 4 hours given seizure  Serum osms  Urine sodium osms and creatinine     CODE STATUS: Presumptive full code    Cutter SABAS Rodriguez M.D.  [facility]  Patient is critically ill.   The patient continues to have: Postcardiac arrest, altered mental status, need for mechanical ventilation  The vital organ system that is affected is the: Cardiovascular, neurological, endocrine  If untreated there is a high chance of deterioration into: Fatal arrhythmia, seizures.  And eventually death.   Attending: Dr. Kendall

## 2023-05-08 NOTE — DISCHARGE PLANNING
Medical Social Work    Referral: Acute Medical Patient    Intervention: Pt is a 37 year old male brought in by [facility] from [facility].  Pt is Cory Marmolejo (: 1985).  Per report pt's dad is on his way in but had to get a ride as he is inebriated.  No contact information available for pt's dad.    Plan: SW will follow.

## 2023-05-08 NOTE — ED PROVIDER NOTES
ED Provider Note    CHIEF COMPLAINT  Chief Complaint   Patient presents with    Seizure       EXTERNAL RECORDS REVIEWED  External ED Note [facility]    HPI/ROS  LIMITATION TO HISTORY   Select: Altered mental status / Confusion  OUTSIDE HISTORIAN(S):  EMS [facility]    Cory Marmolejo is a 37 y.o. male who presents to the emergency department for evaluation of altered mental status and cardiopulmonary arrest.  The patient presented to [facility] emergency department in cardiopulmonary arrest.  Per report, he had 45 minutes of BLS CPR in route to the ED at the outside facility.  The patient was intubated there and the obtained ROSC.  The patient does have a history of heavy alcohol use and has had seizures from withdrawal in the past per her report.  He does also have a history of diabetes.  Per report, the patient had fixed and dilated pupils upon arrival to the outside ED.  He did initially get 2 L of normal saline as well as 0.5 L of LR from [facility] in route.  He also received 2.5 mg of Versed for additional seizure-like activity.  He received 100 mg of ketamine and 1 g of Keppra.  He was started on Levophed by [facility] in route as he became hypotensive.  Upon arrival to the ED here he was on 25 mcg/min.  He had received 5 units of insulin as well as 50 of bicarb at the outside facility.    PAST MEDICAL HISTORY   has a past medical history of Alcohol dependence (HCC).    SURGICAL HISTORY   has a past surgical history that includes craniotomy.    FAMILY HISTORY  No family history on file.    SOCIAL HISTORY  Social History     Tobacco Use    Smoking status: Never    Smokeless tobacco: Never   Substance and Sexual Activity    Alcohol use: Yes     Comment: 1/3 of a half gallon a day of vodka    Drug use: Yes     Types: Marijuana, Inhaled     Comment: THC    Sexual activity: Yes     Partners: Female     CURRENT MEDICATIONS  Home Medications       Reviewed by Lisa Noguera (Pharmacy Tech) on 05/07/23 at  2337  Med List Status: Unable to Obtain     Medication Last Dose Status   HUMALOG KWIKPEN 100 UNIT/ML Solution Pen-injector injection PEN  Active   LANTUS SOLOSTAR 100 UNIT/ML Solution Pen-injector injection  Active   levetiracetam (KEPPRA) 500 MG Tab  Active   ondansetron (ZOFRAN ODT) 4 MG TABLET DISPERSIBLE  Active                  ALLERGIES  Allergies   Allergen Reactions    Tylenol Nausea     PHYSICAL EXAM  VITAL SIGNS: /83   Pulse 84   Resp (!) 28   Wt 60 kg (132 lb 4.4 oz)   SpO2 97%   BMI 18.98 kg/m²   Constitutional: The patient is intubated and not responsive.  He is ill-appearing.  HENT: Normocephalic atraumatic. Bilateral external ears normal. Mucous membranes are dry.  There is dark blood that appears dried around bilateral nares.  ET tube is present in the mouth.  Eyes: Pupils are minimally reactive bilaterally.  Neck: Trachea is midline.  Cardiovascular: Regular rate and rhythm. No murmurs, gallops or rubs.  Thorax & Lungs: Diminished breath sounds bilaterally.  Abdomen: Soft and nondistended.  Skin: Extremities are cold and dry.  Lower extremities are mottled.  Extremities: 1+ peripheral pulses. Cap refill is less than 2 seconds. No edema, cyanosis, or clubbing.  Neurologic: GCS is 3.    DIAGNOSTIC STUDIES / PROCEDURES  EKG  I have independently interpreted this EKG  Results for orders placed or performed during the hospital encounter of 23   EKG   Result Value Ref Range    Report       [facility] Emergency Dept.    Test Date:  2023  Pt Name:    KIANA COWAN                   Department: ER  MRN:        [id number]                      Room:        04  Gender:     Male                         Technician: [id number]  :        1985                   Requested By:JENI PEREZ  Order #:    [id number]                    Reading MD:    Measurements  Intervals                                Axis  Rate:       84                           P:          92  UT:         139                           QRS:        56  QRSD:       130                          T:          67  QT:         450  QTc:        533    Interpretive Statements  Sinus rhythm  Nonspecific intraventricular conduction delay  ST elevation, consider anterior injury  Compared to ECG 01/10/2018 07:37:26  Intraventricular conduction delay now present  ST (T wave) deviation now present  Myocardial infarct finding now present  Sinus bradycardia no longer present       LABS  Results for orders placed or performed during the hospital encounter of 23   Lactic Acid   Result Value Ref Range    Lactic Acid 4.9 (HH) 0.5 - 2.0 mmol/L   Prothrombin Time   Result Value Ref Range    PT 21.5 (H) 12.0 - 14.6 sec    INR 1.93 (H) 0.87 - 1.13   APTT   Result Value Ref Range    APTT 36.5 (H) 24.7 - 36.0 sec   EKG   Result Value Ref Range    Report       [facility] Emergency Dept.    Test Date:  2023  Pt Name:    KIANA COWAN                   Department: ER  MRN:        [id number]                      Room:       [facility]  Gender:     Male                         Technician: [id number]  :        1985                   Requested By:JENI PEREZ  Order #:    [id number]                    Reading MD:    Measurements  Intervals                                Axis  Rate:       84                           P:          92  GA:         139                          QRS:        56  QRSD:       130                          T:          67  QT:         450  QTc:        533    Interpretive Statements  Sinus rhythm  Nonspecific intraventricular conduction delay  ST elevation, consider anterior injury  Compared to ECG 01/10/2018 07:37:26  Intraventricular conduction delay now present  ST (T wave) deviation now present  Myocardial infarct finding now present  Sinus bradycardia no longer present     POCT arterial blood gas device results   Result Value Ref Range    Ph 7.031 (LL) 7.400 - 7.500    Pco2 36.4 26.0 - 37.0 mmHg    Po2 94 (H)  64 - 87 mmHg    Tco2 11 (L) 20 - 33 mmol/L    S02 93 93 - 99 %    Hco3 9.6 (L) 17.0 - 25.0 mmol/L    BE -21 (L) -4 - 3 mmol/L    Body Temp see below degrees    O2 Therapy 60 %    iPF Ratio 157     Specimen Arterial     DelSys Vent     Tidal Volume 400 mL    Peep End Expiratory Pressure 8 cmh20    Set Rate 20     Mode APV-CMV    POCT sodium device results   Result Value Ref Range    Istat Sodium 110 (LL) 135 - 145 mmol/L   POCT potassium device results   Result Value Ref Range    Istat Potassium 3.4 (L) 3.6 - 5.5 mmol/L   POCT ionized CA device results   Result Value Ref Range    Istat Ionized Calcium 1.37 (H) 1.10 - 1.30 mmol/L   POCT glucose device results   Result Value Ref Range    POC Glucose, Blood 560 (HH) 65 - 99 mg/dL     RADIOLOGY  I have independently interpreted the diagnostic imaging associated with this visit and am waiting the final reading from the radiologist.   My preliminary interpretation is as follows: Bilateral lower infiltrates  Radiologist interpretation:   DX-CHEST-PORTABLE (1 VIEW)   Final Result         1.  Bilateral lower lobe infiltrates, greater on the right.      OUTSIDE IMAGES-CT HEAD   Final Result      OUTSIDE IMAGES-CT HEAD   Final Result      OUTSIDE IMAGES-DX CHEST   Final Result      EC-ECHOCARDIOGRAM COMPLETE W/O CONT    (Results Pending)   CT-HEAD W/O    (Results Pending)     COURSE & MEDICAL DECISION MAKING    ED Observation Status? No; Patient does not meet criteria for ED Observation.     INITIAL ASSESSMENT, COURSE AND PLAN  Care Narrative: This is a 37-year-old male presenting to the emergency department postcardiopulmonary arrest at an outside facility.  He was intubated at the outside facility and did have good bilateral breath sounds on my exam.  His perfusion was poor and his extremities were cold.  He was on Levophed in route for hypotension.  Upon arrival here his blood pressures did improve and we were able to discontinue the Levophed.    11:21 PM - I discussed the  case with Dr Brady, ICU.  He will come evaluate the patient.    ABG with electrolytes was performed upon arrival here.  His pH had improved to 7.031.  His sodium was 110.  When this is corrected given his hyperglycemia it is 122.  Calcium was 1.37.  Glucose was 560.    I did review the outside images of his CT and this appeared overall unremarkable.    EKG did not show any evidence of STEMI.    The patient did have bilateral lower lobe infiltrates likely secondary to aspiration.  He was covered with ceftriaxone.  Cultures were sent.  Several other labs were sent and pending at time of transfer to the ICU.    CRITICAL CARE NOTE:  Critical care time was provided for 35 minutes exclusive of separately billable procedures and treating other patients. This involved direct bedside patient care, speaking with family members, review of past medical records, reviewing the results of the laboratory and diagnostic studies, consulting with other physicians, as well as evaluating the effectiveness of the therapy instituted as described.    HYDRATION: Based on the patient's presentation of Dehydration, DKA, and Inability to take oral fluids the patient was given IV fluids. IV Hydration was used because oral hydration was not adequate alone. Upon recheck following hydration, the patient was stable.      ADDITIONAL PROBLEM LIST  Cardiopulmonary arrest, altered mental status, seizure-like activity, DKA  DISPOSITION AND DISCUSSIONS  I have discussed management of the patient with the following physicians and HI's:  Dr Brady, ICU    Discussion of management with other \[facility] [place] [facility]\"" or appropriate source(s): [facility]      Escalation of care considered, and ultimately not performed:IV fluids, blood analysis, and diagnostic imaging    Barriers to care at this time, including but not limited to:  None .     Decision tools and prescription drugs considered including, but not limited to: Antibiotics Ceftriaxone .    FINAL IMPRESSION  1.  Seizure (HCC)    2. Diabetic ketoacidosis with coma associated with other specified diabetes mellitus (HCC)    3. Cardiopulmonary arrest (HCC)    4. Seizure-like activity (HCC)      -ADMIT-    Electronically signed by: Michelle Manning D.O., 5/7/2023 11:16 PM

## 2023-05-08 NOTE — PROGRESS NOTES
Med Rec complete per Pt's significant other and Home Pharmacy. Unknown last doses of mediations.  Home Pharmacy:  [facility]

## 2023-05-08 NOTE — PROCEDURES
Arterial Catheter Procedure Note    Date: 5/8/2023    Procedure:  Arterial Catheter Insertion    Indication: Cardiac arrest, hypotension    Physician:  Dr. Ilya Kendall MD    Consent:  Emergent    Procedure:  The patient is intubated and on full mechanical vent support after out of hospital cardiac arrest and prolonged CPR.  Arterial catheter is indicated for continuous invasive BP monitoring to titrate vasoactive agents.  The right radial artery was assessed but deemed inadequate for cannulation due to minimal pulsatile flow.  The right axilla was prepped and draped using sterile barrier precautions.  Using ultrasound guidance, a 20-gauge, 12 cm arterial catheter was placed in the right axillary artery on the first attempt without difficulty.  The guidewire was removed and confirmed.  A good quality arterial waveform was noted on monitor.  The catheter ws sutured in place and a sterile dressing was applied.  No immediate complications.  EBL < 5 cc.

## 2023-05-08 NOTE — ASSESSMENT & PLAN NOTE
RESOLVED    Patient with high anion gap metabolic acidosis. Differential remains broad however DKA is suspected versus postcardiac arrest. Gap has closed x2, lactic acid WNL;  - CTM for electrolyte abnormalities and replete as needed.

## 2023-05-08 NOTE — PROCEDURES
INPATIENT ROUTINE VIDEO ELECTROENCEPHALOGRAM REPORT    REFERRING PROVIDER: Dr. Denny  DOS: 5/8/2023;   ROOM: 24/  TOTAL RECORDING TIME: 0 hours and 24 minutes of total recording time    INDICATION:  Cory Marmolejo 37 y.o. male presenting with altered mental status    RELEVANT TREATMENTS/MEDICATIONS:  Keppra 1000 mg BID  propofol, Last Rate: Stopped (05/08/23 0756)  magnesium sulfate, Last Rate: Stopped (05/08/23 0045)  NORepinephrine, Last Rate: 0.25 mcg/kg/min (05/08/23 0733)  D10%-0.45% NaCl, Last Rate: Stopped (05/08/23 0115)  NS, Last Rate: 100 mL/hr at 05/08/23 0600  D5 NS, Last Rate: Stopped (05/08/23 0115)  insulin infusion (for DKA ONLY), Last Rate: 13 Units/hr (05/08/23 0850)  vasopressin (PITRESSIN) infusion, Last Rate: 0.03 Units/min (05/08/23 0259)        TECHNIQUE:   Routine VEEG was set up by a Neurodiagnostic technologist who performed education to the patient and staff. A minimum of 23 electrodes and 23 channel recording was setup and performed by Neurodiagnostic technologist, in accordance with the international 10-20 system. The study was reviewed in bipolar and referential montages. The recording examined the patient in the  drowsy/sleep and/or comatose state(s).     DESCRIPTION OF THE RECORD:  EEG background: The background was continuous, intermittently asymmetrical, and was without a posterior dominant rhythm, with a mixture of  mostly theta and delta, at times sharply contoured and quasi-rhythmic over the left>right frontocentral regions. Reactivity was minimally present. Spontaneous variability was  was minimally present. State changes were were minimally present.    EEG Sleep: N2 sleep architecture was absent.    ICTAL AND INTERICTAL FINDINGS:   No focal or generalized epileptiform activity noted.     No regional slowing or persistent focal asymmetries were seen.    No definite seizures.     ACTIVATION PROCEDURES:   Intermittent Photic stimulation was performed in a stepwise fashion from 1  to 30 Hz and did not elicited any abnormalities on EEG.     EKG: Sampling of the EKG recording showed tachycardia    EVENTS:  None    INTERPRETATION:   Abnormal video EEG recording in the drowsy/sleep and/or comatose state(s):  - Moderate to severe background slowing, with a mixture of  mostly theta and delta activity, which at times become sharply contoured and quasi-rhythmic over the left>right frontocentral regions. These findings reflect diffuse/multifocal cerebral dysfunction and/or underlying structural pathology. The effects of sedation may be contributing. Clinical and radiographic correlation recommended.   - No definitive epileptiform discharges or other epileptiform phenomena seen.  - No definitive seizures. Clinical correlation is recommended.    Note: This EEG does not rule the possibility of seizures  If the clinical suspicion remains high for seizures, a prolonged recording to capture clinical or subclinical events may be helpful.      Pino Grijalva MD  Department of Neurology at [facility]  General Neurologist and Epileptologist  Director of [facility]'s Level III Comprehensive Epilepsy Program  Professor of Clinical Neurology, [facility].   Phone: [phone]  Fax: [phone]  E-mail: [email or web address]

## 2023-05-08 NOTE — ASSESSMENT & PLAN NOTE
Reported hematemesis and dried blood around mouth and nares bilaterally. Also reported bloody stools  - History of alcoholic hepatitis per chart?,  No histories of varices however no EGD found in chart  - Trend CBC - stable  - IV PPI twice daily

## 2023-05-08 NOTE — ED TRIAGE NOTES
37 y.o.  Male    Chief Complaint   Patient presents with    Seizure       Pt brought in by ambulance from [facility];  intubated ETT with GCS 3/15 with ongoing Levodrip @ 10mcg/ min.  Prior, Pt had witnessed seizure, coffee ground emesis by EMS;  had 40 minutes BLS/CPR @1945 H.   At 2038 H - ROSC ( pupils dilated)     Per EMS pt was ETOH, with history of DM.  Blood sugar was above 500 mg/dl.    K- 5.3 ;     CT head - Negative   Per EMS Treatment received Versed,  Keppra, Fentanyl and Epi IV   Total IVF of LR  2.5 Liters     Pt transfer to hospital bed, put on cardiac monitor.  Soft  restrained applied.     /83   Pulse 84   Resp (!) 28   Wt 60 kg (132 lb 4.4 oz)   SpO2 97%   BMI 18.98 kg/m²

## 2023-05-08 NOTE — ASSESSMENT & PLAN NOTE
RESOLVED    Sodium of 111 at outside hospital, corrected sodium 122, possible etiology of seizure?  Given hematemesis possibly hypovolemic hyponatremia versus SIADH from TBI's?  Versus beer Potomania?  - Serum osms - 294  - UrNa <20, UrOsm 380, UrCr 33 - likely hyponatremia due to hypovolemia  - Fluid resuscitation-2 L of LR prior to admission, post admission banana bag

## 2023-05-08 NOTE — CARE PLAN
The patient is Watcher - Medium risk of patient condition declining or worsening    Shift Goals  Clinical Goals: Stabilize hemodynamics, improve bgl/gap/co2  Patient Goals: CELINA  Family Goals: No family present    Progress made toward(s) clinical / shift goals:  BP stablized with pressors following art line/central line verification, keppra loaded.     Propofol not to be titrated down this shift per MD Michael Ortiz notified of critical labs at 0610 (CPK, BGL, Co2)  On insulin and k scale per protocol    Problem: Safety - Medical Restraint  Goal: Remains free of injury from restraints (Restraint for Interference with Medical Device)  Outcome: Progressing    Problem: Hemodynamics  Goal: Patient's hemodynamics, fluid balance and neurologic status will be stable or improve  Outcome: Progressing       Patient is not progressing towards the following goals: Does not follow commands, blood glucose increased during shift (following detox bag)      Sinus rhythm/sinus tach  0.185/0.08/0.446

## 2023-05-09 ENCOUNTER — APPOINTMENT (OUTPATIENT)
Dept: RADIOLOGY | Facility: MEDICAL CENTER | Age: 38
DRG: 208 | End: 2023-05-09
Attending: INTERNAL MEDICINE
Payer: MEDICAID

## 2023-05-09 LAB
ALBUMIN SERPL BCP-MCNC: 1.7 G/DL (ref 3.2–4.9)
ALBUMIN/GLOB SERPL: 0.9 G/DL
ALP SERPL-CCNC: 61 U/L (ref 30–99)
ALT SERPL-CCNC: 110 U/L (ref 2–50)
ANION GAP SERPL CALC-SCNC: 11 MMOL/L (ref 7–16)
ANION GAP SERPL CALC-SCNC: 12 MMOL/L (ref 7–16)
ANION GAP SERPL CALC-SCNC: 12 MMOL/L (ref 7–16)
ANION GAP SERPL CALC-SCNC: 9 MMOL/L (ref 7–16)
ARTERIAL PATENCY WRIST A: ABNORMAL
ARTERIAL PATENCY WRIST A: ABNORMAL
AST SERPL-CCNC: 256 U/L (ref 12–45)
B-OH-BUTYR SERPL-MCNC: 0.05 MMOL/L (ref 0.02–0.27)
BACTERIA SPEC RESP CULT: NORMAL
BASE EXCESS BLDA CALC-SCNC: -8 MMOL/L (ref -4–3)
BASE EXCESS BLDA CALC-SCNC: -9 MMOL/L (ref -4–3)
BILIRUB SERPL-MCNC: 0.2 MG/DL (ref 0.1–1.5)
BODY TEMPERATURE: ABNORMAL DEGREES
BODY TEMPERATURE: ABNORMAL DEGREES
BREATHS SETTING VENT: 28
BUN SERPL-MCNC: 70 MG/DL (ref 8–22)
BUN SERPL-MCNC: 71 MG/DL (ref 8–22)
BUN SERPL-MCNC: 71 MG/DL (ref 8–22)
BUN SERPL-MCNC: 74 MG/DL (ref 8–22)
CALCIUM ALBUM COR SERPL-MCNC: 9.1 MG/DL (ref 8.5–10.5)
CALCIUM SERPL-MCNC: 6.5 MG/DL (ref 8.5–10.5)
CALCIUM SERPL-MCNC: 6.7 MG/DL (ref 8.5–10.5)
CALCIUM SERPL-MCNC: 7 MG/DL (ref 8.5–10.5)
CALCIUM SERPL-MCNC: 7.3 MG/DL (ref 8.5–10.5)
CHLORIDE SERPL-SCNC: 94 MMOL/L (ref 96–112)
CHLORIDE SERPL-SCNC: 96 MMOL/L (ref 96–112)
CHLORIDE SERPL-SCNC: 97 MMOL/L (ref 96–112)
CHLORIDE SERPL-SCNC: 97 MMOL/L (ref 96–112)
CK SERPL-CCNC: 4747 U/L (ref 0–154)
CO2 BLDA-SCNC: 15 MMOL/L (ref 20–33)
CO2 BLDA-SCNC: 16 MMOL/L (ref 20–33)
CO2 SERPL-SCNC: 13 MMOL/L (ref 20–33)
CO2 SERPL-SCNC: 14 MMOL/L (ref 20–33)
CO2 SERPL-SCNC: 14 MMOL/L (ref 20–33)
CO2 SERPL-SCNC: 15 MMOL/L (ref 20–33)
CREAT SERPL-MCNC: 2.17 MG/DL (ref 0.5–1.4)
CREAT SERPL-MCNC: 2.22 MG/DL (ref 0.5–1.4)
CREAT SERPL-MCNC: 2.28 MG/DL (ref 0.5–1.4)
CREAT SERPL-MCNC: 2.49 MG/DL (ref 0.5–1.4)
DELSYS IDSYS: ABNORMAL
DELSYS IDSYS: ABNORMAL
EKG IMPRESSION: NORMAL
GFR SERPLBLD CREATININE-BSD FMLA CKD-EPI: 33 ML/MIN/1.73 M 2
GFR SERPLBLD CREATININE-BSD FMLA CKD-EPI: 37 ML/MIN/1.73 M 2
GFR SERPLBLD CREATININE-BSD FMLA CKD-EPI: 38 ML/MIN/1.73 M 2
GFR SERPLBLD CREATININE-BSD FMLA CKD-EPI: 39 ML/MIN/1.73 M 2
GLOBULIN SER CALC-MCNC: 1.9 G/DL (ref 1.9–3.5)
GLUCOSE BLD STRIP.AUTO-MCNC: 111 MG/DL (ref 65–99)
GLUCOSE BLD STRIP.AUTO-MCNC: 118 MG/DL (ref 65–99)
GLUCOSE BLD STRIP.AUTO-MCNC: 119 MG/DL (ref 65–99)
GLUCOSE BLD STRIP.AUTO-MCNC: 137 MG/DL (ref 65–99)
GLUCOSE BLD STRIP.AUTO-MCNC: 62 MG/DL (ref 65–99)
GLUCOSE BLD STRIP.AUTO-MCNC: 68 MG/DL (ref 65–99)
GLUCOSE BLD STRIP.AUTO-MCNC: 77 MG/DL (ref 65–99)
GLUCOSE BLD STRIP.AUTO-MCNC: 77 MG/DL (ref 65–99)
GLUCOSE BLD STRIP.AUTO-MCNC: 82 MG/DL (ref 65–99)
GLUCOSE BLD STRIP.AUTO-MCNC: 88 MG/DL (ref 65–99)
GLUCOSE BLD STRIP.AUTO-MCNC: 88 MG/DL (ref 65–99)
GLUCOSE SERPL-MCNC: 101 MG/DL (ref 65–99)
GLUCOSE SERPL-MCNC: 113 MG/DL (ref 65–99)
GLUCOSE SERPL-MCNC: 133 MG/DL (ref 65–99)
GLUCOSE SERPL-MCNC: 78 MG/DL (ref 65–99)
GRAM STN SPEC: NORMAL
HCO3 BLDA-SCNC: 14.1 MMOL/L (ref 17–25)
HCO3 BLDA-SCNC: 14.8 MMOL/L (ref 17–25)
HOROWITZ INDEX BLDA+IHG-RTO: 168 MM[HG]
HOROWITZ INDEX BLDA+IHG-RTO: 223 MM[HG]
MAGNESIUM SERPL-MCNC: 2.2 MG/DL (ref 1.5–2.5)
MAGNESIUM SERPL-MCNC: 2.4 MG/DL (ref 1.5–2.5)
MAGNESIUM SERPL-MCNC: 2.4 MG/DL (ref 1.5–2.5)
MAGNESIUM SERPL-MCNC: 3.5 MG/DL (ref 1.5–2.5)
MODE IMODE: ABNORMAL
MODE IMODE: ABNORMAL
O2/TOTAL GAS SETTING VFR VENT: 40 %
O2/TOTAL GAS SETTING VFR VENT: 40 %
PCO2 BLDA: 18.9 MMHG (ref 26–37)
PCO2 BLDA: 24.4 MMHG (ref 26–37)
PCO2 TEMP ADJ BLDA: 19.4 MMHG (ref 26–37)
PCO2 TEMP ADJ BLDA: 23.8 MMHG (ref 26–37)
PEEP END EXPIRATORY PRESSURE IPEEP: 8 CMH20
PEEP END EXPIRATORY PRESSURE IPEEP: 8 CMH20
PERCENT MINUTE VOLUME IPMV: 100
PH BLDA: 7.39 [PH] (ref 7.4–7.5)
PH BLDA: 7.48 [PH] (ref 7.4–7.5)
PH TEMP ADJ BLDA: 7.4 [PH] (ref 7.4–7.5)
PH TEMP ADJ BLDA: 7.47 [PH] (ref 7.4–7.5)
PHOSPHATE SERPL-MCNC: 2.1 MG/DL (ref 2.5–4.5)
PHOSPHATE SERPL-MCNC: 3 MG/DL (ref 2.5–4.5)
PO2 BLDA: 67 MMHG (ref 64–87)
PO2 BLDA: 89 MMHG (ref 64–87)
PO2 TEMP ADJ BLDA: 65 MMHG (ref 64–87)
PO2 TEMP ADJ BLDA: 92 MMHG (ref 64–87)
POTASSIUM SERPL-SCNC: 4 MMOL/L (ref 3.6–5.5)
POTASSIUM SERPL-SCNC: 4.5 MMOL/L (ref 3.6–5.5)
POTASSIUM SERPL-SCNC: 4.5 MMOL/L (ref 3.6–5.5)
POTASSIUM SERPL-SCNC: 4.9 MMOL/L (ref 3.6–5.5)
PROT SERPL-MCNC: 3.6 G/DL (ref 6–8.2)
SAO2 % BLDA: 93 % (ref 93–99)
SAO2 % BLDA: 98 % (ref 93–99)
SIGNIFICANT IND 70042: NORMAL
SITE SITE: NORMAL
SODIUM SERPL-SCNC: 120 MMOL/L (ref 135–145)
SODIUM SERPL-SCNC: 121 MMOL/L (ref 135–145)
SODIUM SERPL-SCNC: 121 MMOL/L (ref 135–145)
SODIUM SERPL-SCNC: 122 MMOL/L (ref 135–145)
SOURCE SOURCE: NORMAL
SPECIMEN DRAWN FROM PATIENT: ABNORMAL
SPECIMEN DRAWN FROM PATIENT: ABNORMAL
TIDAL VOLUME IVT: 450 ML
TROPONIN T SERPL-MCNC: 81 NG/L (ref 6–19)
TROPONIN T SERPL-MCNC: 85 NG/L (ref 6–19)

## 2023-05-09 PROCEDURE — 700105 HCHG RX REV CODE 258: Performed by: INTERNAL MEDICINE

## 2023-05-09 PROCEDURE — 700111 HCHG RX REV CODE 636 W/ 250 OVERRIDE (IP): Performed by: INTERNAL MEDICINE

## 2023-05-09 PROCEDURE — C9113 INJ PANTOPRAZOLE SODIUM, VIA: HCPCS | Performed by: INTERNAL MEDICINE

## 2023-05-09 PROCEDURE — 82010 KETONE BODYS QUAN: CPT

## 2023-05-09 PROCEDURE — 82550 ASSAY OF CK (CPK): CPT

## 2023-05-09 PROCEDURE — 82962 GLUCOSE BLOOD TEST: CPT | Mod: 91

## 2023-05-09 PROCEDURE — 80053 COMPREHEN METABOLIC PANEL: CPT

## 2023-05-09 PROCEDURE — 93005 ELECTROCARDIOGRAM TRACING: CPT | Performed by: INTERNAL MEDICINE

## 2023-05-09 PROCEDURE — A9270 NON-COVERED ITEM OR SERVICE: HCPCS | Performed by: STUDENT IN AN ORGANIZED HEALTH CARE EDUCATION/TRAINING PROGRAM

## 2023-05-09 PROCEDURE — 700111 HCHG RX REV CODE 636 W/ 250 OVERRIDE (IP): Performed by: STUDENT IN AN ORGANIZED HEALTH CARE EDUCATION/TRAINING PROGRAM

## 2023-05-09 PROCEDURE — 99292 CRITICAL CARE ADDL 30 MIN: CPT | Mod: GC | Performed by: INTERNAL MEDICINE

## 2023-05-09 PROCEDURE — 700105 HCHG RX REV CODE 258: Performed by: STUDENT IN AN ORGANIZED HEALTH CARE EDUCATION/TRAINING PROGRAM

## 2023-05-09 PROCEDURE — A9270 NON-COVERED ITEM OR SERVICE: HCPCS | Performed by: INTERNAL MEDICINE

## 2023-05-09 PROCEDURE — 94003 VENT MGMT INPAT SUBQ DAY: CPT

## 2023-05-09 PROCEDURE — 700102 HCHG RX REV CODE 250 W/ 637 OVERRIDE(OP): Performed by: STUDENT IN AN ORGANIZED HEALTH CARE EDUCATION/TRAINING PROGRAM

## 2023-05-09 PROCEDURE — 84484 ASSAY OF TROPONIN QUANT: CPT

## 2023-05-09 PROCEDURE — 83735 ASSAY OF MAGNESIUM: CPT | Mod: 91

## 2023-05-09 PROCEDURE — 84100 ASSAY OF PHOSPHORUS: CPT

## 2023-05-09 PROCEDURE — 93010 ELECTROCARDIOGRAM REPORT: CPT | Performed by: INTERNAL MEDICINE

## 2023-05-09 PROCEDURE — 71045 X-RAY EXAM CHEST 1 VIEW: CPT

## 2023-05-09 PROCEDURE — 99291 CRITICAL CARE FIRST HOUR: CPT | Mod: GC | Performed by: INTERNAL MEDICINE

## 2023-05-09 PROCEDURE — 700101 HCHG RX REV CODE 250: Performed by: INTERNAL MEDICINE

## 2023-05-09 PROCEDURE — 700102 HCHG RX REV CODE 250 W/ 637 OVERRIDE(OP): Performed by: INTERNAL MEDICINE

## 2023-05-09 PROCEDURE — 82803 BLOOD GASES ANY COMBINATION: CPT | Mod: 91

## 2023-05-09 PROCEDURE — 770022 HCHG ROOM/CARE - ICU (200)

## 2023-05-09 PROCEDURE — 80048 BASIC METABOLIC PNL TOTAL CA: CPT | Mod: 91

## 2023-05-09 PROCEDURE — 86316 IMMUNOASSAY TUMOR OTHER: CPT

## 2023-05-09 PROCEDURE — 37799 UNLISTED PX VASCULAR SURGERY: CPT

## 2023-05-09 PROCEDURE — 700101 HCHG RX REV CODE 250: Performed by: STUDENT IN AN ORGANIZED HEALTH CARE EDUCATION/TRAINING PROGRAM

## 2023-05-09 RX ORDER — DEXMEDETOMIDINE HYDROCHLORIDE 4 UG/ML
.1-1.5 INJECTION, SOLUTION INTRAVENOUS CONTINUOUS
Status: DISCONTINUED | OUTPATIENT
Start: 2023-05-09 | End: 2023-05-12

## 2023-05-09 RX ORDER — POTASSIUM CHLORIDE 14.9 MG/ML
20 INJECTION INTRAVENOUS ONCE
Status: COMPLETED | OUTPATIENT
Start: 2023-05-09 | End: 2023-05-09

## 2023-05-09 RX ORDER — LEVETIRACETAM 500 MG/5ML
500 INJECTION, SOLUTION, CONCENTRATE INTRAVENOUS EVERY 12 HOURS
Status: DISCONTINUED | OUTPATIENT
Start: 2023-05-09 | End: 2023-05-09

## 2023-05-09 RX ORDER — INSULIN LISPRO 100 [IU]/ML
2-9 INJECTION, SOLUTION INTRAVENOUS; SUBCUTANEOUS
Status: DISCONTINUED | OUTPATIENT
Start: 2023-05-09 | End: 2023-05-09

## 2023-05-09 RX ORDER — POTASSIUM CHLORIDE 7.45 MG/ML
10 INJECTION INTRAVENOUS ONCE
Status: COMPLETED | OUTPATIENT
Start: 2023-05-09 | End: 2023-05-09

## 2023-05-09 RX ORDER — LEVETIRACETAM 500 MG/1
500 TABLET ORAL 2 TIMES DAILY
Status: DISCONTINUED | OUTPATIENT
Start: 2023-05-09 | End: 2023-05-13

## 2023-05-09 RX ORDER — INSULIN LISPRO 100 [IU]/ML
0.2 INJECTION, SOLUTION INTRAVENOUS; SUBCUTANEOUS
Status: DISCONTINUED | OUTPATIENT
Start: 2023-05-09 | End: 2023-05-09

## 2023-05-09 RX ORDER — INSULIN LISPRO 100 [IU]/ML
2-9 INJECTION, SOLUTION INTRAVENOUS; SUBCUTANEOUS EVERY 6 HOURS
Status: DISCONTINUED | OUTPATIENT
Start: 2023-05-10 | End: 2023-05-13

## 2023-05-09 RX ORDER — SODIUM CHLORIDE, SODIUM GLUCONATE, SODIUM ACETATE, POTASSIUM CHLORIDE AND MAGNESIUM CHLORIDE 526; 502; 368; 37; 30 MG/100ML; MG/100ML; MG/100ML; MG/100ML; MG/100ML
500 INJECTION, SOLUTION INTRAVENOUS ONCE
Status: COMPLETED | OUTPATIENT
Start: 2023-05-09 | End: 2023-05-09

## 2023-05-09 RX ADMIN — SODIUM BICARBONATE 50 MEQ: 84 INJECTION INTRAVENOUS at 03:31

## 2023-05-09 RX ADMIN — NOREPINEPHRINE BITARTRATE 0.4 MCG/KG/MIN: 1 INJECTION, SOLUTION, CONCENTRATE INTRAVENOUS at 10:19

## 2023-05-09 RX ADMIN — CALCIUM CHLORIDE 1000 MG: 100 INJECTION, SOLUTION INTRAVENOUS at 02:13

## 2023-05-09 RX ADMIN — INSULIN LISPRO 4 UNITS: 100 INJECTION, SOLUTION INTRAVENOUS; SUBCUTANEOUS at 12:51

## 2023-05-09 RX ADMIN — THERA TABS 1 TABLET: TAB at 05:04

## 2023-05-09 RX ADMIN — FENTANYL CITRATE 100 MCG: 50 INJECTION, SOLUTION INTRAMUSCULAR; INTRAVENOUS at 01:58

## 2023-05-09 RX ADMIN — SODIUM PHOSPHATE, MONOBASIC, MONOHYDRATE AND SODIUM PHOSPHATE, DIBASIC, ANHYDROUS 15 MMOL: 276; 142 INJECTION, SOLUTION INTRAVENOUS at 15:21

## 2023-05-09 RX ADMIN — DEXMEDETOMIDINE 1 MCG/KG/HR: 200 INJECTION, SOLUTION INTRAVENOUS at 08:48

## 2023-05-09 RX ADMIN — AMPICILLIN SODIUM AND SULBACTAM SODIUM 3 G: 2; 1 INJECTION, POWDER, FOR SOLUTION INTRAMUSCULAR; INTRAVENOUS at 23:25

## 2023-05-09 RX ADMIN — SODIUM BICARBONATE 75 MEQ: 84 INJECTION, SOLUTION INTRAVENOUS at 08:43

## 2023-05-09 RX ADMIN — POTASSIUM CHLORIDE 10 MEQ: 7.46 INJECTION, SOLUTION INTRAVENOUS at 06:12

## 2023-05-09 RX ADMIN — AMPICILLIN SODIUM AND SULBACTAM SODIUM 3 G: 2; 1 INJECTION, POWDER, FOR SOLUTION INTRAMUSCULAR; INTRAVENOUS at 12:34

## 2023-05-09 RX ADMIN — POTASSIUM CHLORIDE 10 MEQ: 7.46 INJECTION, SOLUTION INTRAVENOUS at 02:01

## 2023-05-09 RX ADMIN — FOLIC ACID 1 MG: 1 TABLET ORAL at 05:04

## 2023-05-09 RX ADMIN — NOREPINEPHRINE BITARTRATE 0.75 MCG/KG/MIN: 1 INJECTION, SOLUTION, CONCENTRATE INTRAVENOUS at 01:59

## 2023-05-09 RX ADMIN — VASOPRESSIN 0.03 UNITS/MIN: 20 INJECTION INTRAVENOUS at 20:45

## 2023-05-09 RX ADMIN — NOREPINEPHRINE BITARTRATE 0.65 MCG/KG/MIN: 1 INJECTION, SOLUTION, CONCENTRATE INTRAVENOUS at 05:21

## 2023-05-09 RX ADMIN — SENNOSIDES AND DOCUSATE SODIUM 2 TABLET: 50; 8.6 TABLET ORAL at 05:04

## 2023-05-09 RX ADMIN — VASOPRESSIN 0.03 UNITS/MIN: 20 INJECTION INTRAVENOUS at 10:40

## 2023-05-09 RX ADMIN — PANTOPRAZOLE SODIUM 40 MG: 40 INJECTION, POWDER, FOR SOLUTION INTRAVENOUS at 17:54

## 2023-05-09 RX ADMIN — DEXMEDETOMIDINE 1.2 MCG/KG/HR: 200 INJECTION, SOLUTION INTRAVENOUS at 13:01

## 2023-05-09 RX ADMIN — DEXMEDETOMIDINE 1.2 MCG/KG/HR: 200 INJECTION, SOLUTION INTRAVENOUS at 20:42

## 2023-05-09 RX ADMIN — LEVETIRACETAM 1000 MG: 100 INJECTION, SOLUTION INTRAVENOUS at 05:04

## 2023-05-09 RX ADMIN — LEVETIRACETAM 500 MG: 500 TABLET, FILM COATED ORAL at 17:54

## 2023-05-09 RX ADMIN — FENTANYL CITRATE 50 MCG: 50 INJECTION INTRAMUSCULAR; INTRAVENOUS at 08:08

## 2023-05-09 RX ADMIN — POTASSIUM CHLORIDE 20 MEQ: 14.9 INJECTION, SOLUTION INTRAVENOUS at 10:38

## 2023-05-09 RX ADMIN — SODIUM CHLORIDE, SODIUM GLUCONATE, SODIUM ACETATE, POTASSIUM CHLORIDE AND MAGNESIUM CHLORIDE 500 ML: 526; 502; 368; 37; 30 INJECTION, SOLUTION INTRAVENOUS at 12:00

## 2023-05-09 RX ADMIN — DEXMEDETOMIDINE 1 MCG/KG/HR: 200 INJECTION, SOLUTION INTRAVENOUS at 01:25

## 2023-05-09 RX ADMIN — AMPICILLIN SODIUM AND SULBACTAM SODIUM 3 G: 2; 1 INJECTION, POWDER, FOR SOLUTION INTRAMUSCULAR; INTRAVENOUS at 17:54

## 2023-05-09 RX ADMIN — PANTOPRAZOLE SODIUM 40 MG: 40 INJECTION, POWDER, FOR SOLUTION INTRAVENOUS at 05:04

## 2023-05-09 RX ADMIN — AMPICILLIN SODIUM AND SULBACTAM SODIUM 3 G: 2; 1 INJECTION, POWDER, FOR SOLUTION INTRAMUSCULAR; INTRAVENOUS at 00:26

## 2023-05-09 RX ADMIN — THIAMINE HYDROCHLORIDE 100 MG: 100 INJECTION, SOLUTION INTRAMUSCULAR; INTRAVENOUS at 09:00

## 2023-05-09 RX ADMIN — AMPICILLIN SODIUM AND SULBACTAM SODIUM 3 G: 2; 1 INJECTION, POWDER, FOR SOLUTION INTRAMUSCULAR; INTRAVENOUS at 05:05

## 2023-05-09 RX ADMIN — NOREPINEPHRINE BITARTRATE 0.32 MCG/KG/MIN: 1 INJECTION, SOLUTION, CONCENTRATE INTRAVENOUS at 16:15

## 2023-05-09 ASSESSMENT — PAIN DESCRIPTION - PAIN TYPE
TYPE: ACUTE PAIN

## 2023-05-09 ASSESSMENT — FIBROSIS 4 INDEX: FIB4 SCORE: 10.67

## 2023-05-09 NOTE — PROGRESS NOTES
Dr Rodriguez notified of critical phos 0.5. Contacted Mary for correct PRN to be given. Mary requesting from central pharmacy

## 2023-05-09 NOTE — CARE PLAN
Problem: Ventilation  Goal: Ability to achieve and maintain unassisted ventilation or tolerate decreased levels of ventilator support  Description: Target End Date:  4 days     Document on Vent flowsheet    1.  Support and monitor invasive and noninvasive mechanical ventilation  2.  Monitor ventilator weaning response  3.  Perform ventilator associated pneumonia prevention interventions  4.  Manage ventilation therapy by monitoring diagnostic test results  Outcome: Progressing    Respiratory Update    Ventilator Daily Summary    Vent Day # 2    ETT: 8 @ 24    Ventilator settings: CMV 28 450 8 60%      Plan: Continue current ventilator settings and wean mechanical ventilation as tolerated per physician orders.

## 2023-05-09 NOTE — PROGRESS NOTES
Called by nurse for worsening hypotension, nursing increasing levo up to 0.85 blood pressures initially in the 70s concern for pulses paradoxus with art lines and distant heart sounds per nursing.Patient had just been turned blood pressures were running to the 80s and 90s systolic, down titrating nor epi as reasonable during my evaluation.  Heart sounds equivocal to my examination on admission yesterday.  - Bedside cardiac ultrasound was performed by myself, with small anterior pericardial effusion which was also seen on earlier, there were no signs of hemodynamic compromise  - Continue to monitor clinically hypotension secondary to loss of vagal tone?
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
## 2023-05-09 NOTE — PROGRESS NOTES
Dr Rodriguez notified that BG is 75 while on DKA protocol. Order from Dr Gutierrez to decrease insulin gtt rate to 9 units/hr and recheck in 1 hour. If still low give on amp D50

## 2023-05-09 NOTE — DIETARY
"Nutrition Support Assessment:  Day 1 of admit.  Cory Marmolejo is a 37 y.o. male with admitting DX of Cardiac arrest (HCC)     Current problem list:  Seizure  Acute respiratory failure with hypoxia  High anion gap metabolic acidosis  Lactic acidosis  Hematemesis  Diabetic ketoacidosis with coma associated with type 2 diabetes mellitus  LEIGH  TBI  Rhabdomyolysis  Shock  Chronic liver failure  Transaminitis  Hyponatremia.     Assessment:  Estimated Nutritional Needs based on:   Height: 177.8 cm (5' 10\")  Weight: 56.8 kg (125 lb 3.5 oz)  Weight to Use in Calculations: 56.8 kg (125 lb 3.5 oz)  Body mass index is 17.97 kg/m²., BMI classification: Underweight    Calculation/Equation:  MSJ * 1.2 = 1806 kcal/day  PSU (Vent: 11.9,Mtemp: 37) = 1780 kcal/day  Total Calories / day: 1704 - 1817  (Calories / k - 32)  Total Grams Protein / day: 85 - 113  (Grams Protein / k.5 - 2.0)     Evaluation:   Pt currently intubated requiring mechanical ventilation; consult per MD  NG tube placed \"Nasogastric tube is identified with the tip terminating in the gastric fundus. \" Per diagnostic. Currently set on suction with brown; coffee ground output.  Current clinical picture and progress notes reviewed.  Pt risk for refeeding syndrome d/t electrolytes moderately/significantly low requiring aggressive supplementation.  Labs: Na 120. Glucose 101. Bun 70. Creatinine 2.28. GFR 37. (Phos on  0.5, on  2.1)  Meds: Levo 0.4 mcg. Vaso 0.03 mcg. Detox IV completed. Folic acid. Multivitamin. SSI. ICU lyte replacement protocol. BM protocol. Thiamin IV.  Last BM:   Fiber free formula justified d/t Pt requiring pressor use.     Malnutrition Risk: Unable to fully assess at this time as Pt mechanically ventilated and critically ill.     Recommendations/Plan:  Initiate Impact Peptide 1.5 at 20 mL/hr and advance by 10 mL/hr q12 hrs until a goal of 50 mL/hr is met. Impact Peptide 1.5, goal rate 50 ml/hr, providing 1800 kcals, 112 grams protein, " 924 mL free water, 168 g of CHO.  Monitor refeeding labs  Fluids per MD.  Monitor weight    RD following.

## 2023-05-09 NOTE — CARE PLAN
The patient is Watcher - Medium risk of patient condition declining or worsening    Shift Goals  Clinical Goals: MAP >65, improved neuro  Patient Goals: CELINA  Family Goals: CELINA    Progress made toward(s) clinical / shift goals:    Problem: Hemodynamics  Goal: Patient's hemodynamics, fluid balance and neurologic status will be stable or improve  Outcome: Progressing     Problem: Respiratory  Goal: Patient will achieve/maintain optimum respiratory ventilation and gas exchange  Outcome: Progressing     Problem: Mechanical Ventilation  Goal: Safe management of artificial airway and ventilation  Outcome: Progressing     Problem: Risk for Aspiration  Goal: Patient's risk for aspiration will be absent or decrease  Outcome: Progressing     Problem: Safety - Medical Restraint  Goal: Remains free of injury from restraints (Restraint for Interference with Medical Device)  Outcome: Progressing  Goal: Free from restraint(s) (Restraint for Interference with Medical Device)  Outcome: Progressing     Problem: Pain - Standard  Goal: Alleviation of pain or a reduction in pain to the patient’s comfort goal  Outcome: Progressing     Problem: Skin Integrity  Goal: Skin integrity is maintained or improved  Outcome: Progressing     Problem: Fall Risk  Goal: Patient will remain free from falls  Outcome: Progressing

## 2023-05-09 NOTE — PROGRESS NOTES
MD Rodriguez updated that patients blood glucose was 64 on recheck. Ordered to decrease Insulin to 7 units and increase D10/0.45 NS to 150.

## 2023-05-09 NOTE — DISCHARGE PLANNING
Care Transition Team Assessment    RNCM completed assessment with information obtained from chart review as patient is currently intubated and sedated. Patient was transferred from [facility] after presenting in cardiac arrest. Patient was down for an unknown amount of time. Patient has hd multiple TBI's in the past and hx of seizures. Patient was also in DKA. Discharge disposition is TBD. HCM will continue to follow for any discharge planning assistance.     Information Source  Orientation Level: Unable to assess  Information Given By:  (EMR-patient is intubated and sedated)  Who is responsible for making decisions for patient? : Legal next of kin    Readmission Evaluation  Is this a readmission?: No    Elopement Risk  Legal Hold: No  Ambulatory or Self Mobile in Wheelchair: No-Not an Elopement Risk  Elopement Risk: Not at Risk for Elopement         Discharge Preparedness  What is your plan after discharge?: Uncertain - pending medical team collaboration, Skilled nursing facility, Home health care  What are your discharge supports?: Partner  Prior Functional Level: Ambulatory, Independent with Activities of Daily Living, Independent with Medication Management, Drives Self  Difficulity with ADLs: None  Difficulity with IADLs: None    Functional Assesment  Prior Functional Level: Ambulatory, Independent with Activities of Daily Living, Independent with Medication Management, Drives Self    Finances  Financial Barriers to Discharge: No  Prescription Coverage: Yes              Advance Directive  Advance Directive?: None         Psychological Assessment  History of Substance Abuse: None  History of Psychiatric Problems: No  Non-compliant with Treatment: No  Newly Diagnosed Illness: Yes    Discharge Risks or Barriers  Discharge risks or barriers?: No PCP, Uninsured / underinsured, Post-acute placement / services, Complex medical needs  Patient risk factors: Complex medical needs, No PCP, Uninsured or  underinsured    Anticipated Discharge Information  Discharge Disposition: Still a Patient (30)

## 2023-05-10 ENCOUNTER — APPOINTMENT (OUTPATIENT)
Dept: RADIOLOGY | Facility: MEDICAL CENTER | Age: 38
DRG: 208 | End: 2023-05-10
Attending: INTERNAL MEDICINE
Payer: MEDICAID

## 2023-05-10 LAB
ALBUMIN SERPL BCP-MCNC: 1.7 G/DL (ref 3.2–4.9)
ALBUMIN/GLOB SERPL: 0.8 G/DL
ALP SERPL-CCNC: 60 U/L (ref 30–99)
ALT SERPL-CCNC: 123 U/L (ref 2–50)
ANION GAP SERPL CALC-SCNC: 15 MMOL/L (ref 7–16)
ANION GAP SERPL CALC-SCNC: 16 MMOL/L (ref 7–16)
ARTERIAL PATENCY WRIST A: ABNORMAL
AST SERPL-CCNC: 261 U/L (ref 12–45)
B-OH-BUTYR SERPL-MCNC: 0.33 MMOL/L (ref 0.02–0.27)
BACTERIA UR CULT: NORMAL
BASE EXCESS BLDA CALC-SCNC: -7 MMOL/L (ref -4–3)
BASOPHILS # BLD AUTO: 0 % (ref 0–1.8)
BASOPHILS # BLD: 0 K/UL (ref 0–0.12)
BILIRUB SERPL-MCNC: 0.3 MG/DL (ref 0.1–1.5)
BODY TEMPERATURE: ABNORMAL DEGREES
BUN SERPL-MCNC: 73 MG/DL (ref 8–22)
BUN SERPL-MCNC: 76 MG/DL (ref 8–22)
BURR CELLS BLD QL SMEAR: NORMAL
CALCIUM ALBUM COR SERPL-MCNC: 9.9 MG/DL (ref 8.5–10.5)
CALCIUM SERPL-MCNC: 6.8 MG/DL (ref 8.5–10.5)
CALCIUM SERPL-MCNC: 8.1 MG/DL (ref 8.5–10.5)
CHLORIDE SERPL-SCNC: 97 MMOL/L (ref 96–112)
CHLORIDE SERPL-SCNC: 98 MMOL/L (ref 96–112)
CO2 BLDA-SCNC: 16 MMOL/L (ref 20–33)
CO2 SERPL-SCNC: 13 MMOL/L (ref 20–33)
CO2 SERPL-SCNC: 14 MMOL/L (ref 20–33)
CREAT SERPL-MCNC: 2.96 MG/DL (ref 0.5–1.4)
CREAT SERPL-MCNC: 3.13 MG/DL (ref 0.5–1.4)
DELSYS IDSYS: ABNORMAL
EOSINOPHIL # BLD AUTO: 0 K/UL (ref 0–0.51)
EOSINOPHIL NFR BLD: 0 % (ref 0–6.9)
ERYTHROCYTE [DISTWIDTH] IN BLOOD BY AUTOMATED COUNT: 40.8 FL (ref 35.9–50)
GFR SERPLBLD CREATININE-BSD FMLA CKD-EPI: 25 ML/MIN/1.73 M 2
GFR SERPLBLD CREATININE-BSD FMLA CKD-EPI: 27 ML/MIN/1.73 M 2
GLOBULIN SER CALC-MCNC: 2.1 G/DL (ref 1.9–3.5)
GLUCOSE BLD STRIP.AUTO-MCNC: 102 MG/DL (ref 65–99)
GLUCOSE BLD STRIP.AUTO-MCNC: 132 MG/DL (ref 65–99)
GLUCOSE BLD STRIP.AUTO-MCNC: 134 MG/DL (ref 65–99)
GLUCOSE BLD STRIP.AUTO-MCNC: 150 MG/DL (ref 65–99)
GLUCOSE BLD STRIP.AUTO-MCNC: 233 MG/DL (ref 65–99)
GLUCOSE BLD STRIP.AUTO-MCNC: 99 MG/DL (ref 65–99)
GLUCOSE SERPL-MCNC: 115 MG/DL (ref 65–99)
GLUCOSE SERPL-MCNC: 152 MG/DL (ref 65–99)
HCO3 BLDA-SCNC: 15.6 MMOL/L (ref 17–25)
HCT VFR BLD AUTO: 24.4 % (ref 42–52)
HGB BLD-MCNC: 9.1 G/DL (ref 14–18)
HOROWITZ INDEX BLDA+IHG-RTO: 200 MM[HG]
LYMPHOCYTES # BLD AUTO: 0.23 K/UL (ref 1–4.8)
LYMPHOCYTES NFR BLD: 4 % (ref 22–41)
MAGNESIUM SERPL-MCNC: 2 MG/DL (ref 1.5–2.5)
MAGNESIUM SERPL-MCNC: 2 MG/DL (ref 1.5–2.5)
MANUAL DIFF BLD: NORMAL
MCH RBC QN AUTO: 32.5 PG (ref 27–33)
MCHC RBC AUTO-ENTMCNC: 37.3 G/DL (ref 33.7–35.3)
MCV RBC AUTO: 87.1 FL (ref 81.4–97.8)
MODE IMODE: ABNORMAL
MONOCYTES # BLD AUTO: 0.14 K/UL (ref 0–0.85)
MONOCYTES NFR BLD AUTO: 2.4 % (ref 0–13.4)
MORPHOLOGY BLD-IMP: NORMAL
NEUTROPHILS # BLD AUTO: 5.34 K/UL (ref 1.82–7.42)
NEUTROPHILS NFR BLD: 93.6 % (ref 44–72)
NRBC # BLD AUTO: 0 K/UL
NRBC BLD-RTO: 0 /100 WBC
O2/TOTAL GAS SETTING VFR VENT: 40 %
PCO2 BLDA: 21.1 MMHG (ref 26–37)
PCO2 TEMP ADJ BLDA: 21 MMHG (ref 26–37)
PEEP END EXPIRATORY PRESSURE IPEEP: 8 CMH20
PERCENT MINUTE VOLUME IPMV: 90
PH BLDA: 7.48 [PH] (ref 7.4–7.5)
PH TEMP ADJ BLDA: 7.48 [PH] (ref 7.4–7.5)
PHOSPHATE SERPL-MCNC: 2.2 MG/DL (ref 2.5–4.5)
PLATELET # BLD AUTO: 41 K/UL (ref 164–446)
PLATELET BLD QL SMEAR: NORMAL
PMV BLD AUTO: 11.9 FL (ref 9–12.9)
PO2 BLDA: 80 MMHG (ref 64–87)
PO2 TEMP ADJ BLDA: 80 MMHG (ref 64–87)
POIKILOCYTOSIS BLD QL SMEAR: NORMAL
POTASSIUM SERPL-SCNC: 4.2 MMOL/L (ref 3.6–5.5)
POTASSIUM SERPL-SCNC: 4.2 MMOL/L (ref 3.6–5.5)
PROT SERPL-MCNC: 3.8 G/DL (ref 6–8.2)
RBC # BLD AUTO: 2.8 M/UL (ref 4.7–6.1)
RBC BLD AUTO: PRESENT
SAO2 % BLDA: 97 % (ref 93–99)
SIGNIFICANT IND 70042: NORMAL
SITE SITE: NORMAL
SODIUM SERPL-SCNC: 126 MMOL/L (ref 135–145)
SODIUM SERPL-SCNC: 127 MMOL/L (ref 135–145)
SOURCE SOURCE: NORMAL
SPECIMEN DRAWN FROM PATIENT: ABNORMAL
TOXIC GRANULES BLD QL SMEAR: NORMAL
WBC # BLD AUTO: 5.7 K/UL (ref 4.8–10.8)

## 2023-05-10 PROCEDURE — 700102 HCHG RX REV CODE 250 W/ 637 OVERRIDE(OP): Performed by: INTERNAL MEDICINE

## 2023-05-10 PROCEDURE — 94640 AIRWAY INHALATION TREATMENT: CPT

## 2023-05-10 PROCEDURE — 700111 HCHG RX REV CODE 636 W/ 250 OVERRIDE (IP): Performed by: STUDENT IN AN ORGANIZED HEALTH CARE EDUCATION/TRAINING PROGRAM

## 2023-05-10 PROCEDURE — 700105 HCHG RX REV CODE 258: Performed by: STUDENT IN AN ORGANIZED HEALTH CARE EDUCATION/TRAINING PROGRAM

## 2023-05-10 PROCEDURE — 99291 CRITICAL CARE FIRST HOUR: CPT | Mod: GC | Performed by: INTERNAL MEDICINE

## 2023-05-10 PROCEDURE — 37799 UNLISTED PX VASCULAR SURGERY: CPT

## 2023-05-10 PROCEDURE — C9113 INJ PANTOPRAZOLE SODIUM, VIA: HCPCS | Performed by: INTERNAL MEDICINE

## 2023-05-10 PROCEDURE — 700111 HCHG RX REV CODE 636 W/ 250 OVERRIDE (IP): Performed by: INTERNAL MEDICINE

## 2023-05-10 PROCEDURE — 700105 HCHG RX REV CODE 258: Performed by: INTERNAL MEDICINE

## 2023-05-10 PROCEDURE — 82010 KETONE BODYS QUAN: CPT

## 2023-05-10 PROCEDURE — 71045 X-RAY EXAM CHEST 1 VIEW: CPT

## 2023-05-10 PROCEDURE — 80053 COMPREHEN METABOLIC PANEL: CPT

## 2023-05-10 PROCEDURE — 700101 HCHG RX REV CODE 250: Performed by: INTERNAL MEDICINE

## 2023-05-10 PROCEDURE — 82962 GLUCOSE BLOOD TEST: CPT | Mod: 91

## 2023-05-10 PROCEDURE — 94003 VENT MGMT INPAT SUBQ DAY: CPT

## 2023-05-10 PROCEDURE — 85025 COMPLETE CBC W/AUTO DIFF WBC: CPT

## 2023-05-10 PROCEDURE — 85007 BL SMEAR W/DIFF WBC COUNT: CPT

## 2023-05-10 PROCEDURE — 700101 HCHG RX REV CODE 250: Performed by: STUDENT IN AN ORGANIZED HEALTH CARE EDUCATION/TRAINING PROGRAM

## 2023-05-10 PROCEDURE — 700102 HCHG RX REV CODE 250 W/ 637 OVERRIDE(OP): Performed by: STUDENT IN AN ORGANIZED HEALTH CARE EDUCATION/TRAINING PROGRAM

## 2023-05-10 PROCEDURE — 83735 ASSAY OF MAGNESIUM: CPT

## 2023-05-10 PROCEDURE — 770022 HCHG ROOM/CARE - ICU (200)

## 2023-05-10 PROCEDURE — 94150 VITAL CAPACITY TEST: CPT

## 2023-05-10 PROCEDURE — A9270 NON-COVERED ITEM OR SERVICE: HCPCS | Performed by: STUDENT IN AN ORGANIZED HEALTH CARE EDUCATION/TRAINING PROGRAM

## 2023-05-10 PROCEDURE — 84100 ASSAY OF PHOSPHORUS: CPT

## 2023-05-10 PROCEDURE — A9270 NON-COVERED ITEM OR SERVICE: HCPCS | Performed by: INTERNAL MEDICINE

## 2023-05-10 PROCEDURE — 82803 BLOOD GASES ANY COMBINATION: CPT

## 2023-05-10 RX ORDER — OXYCODONE HYDROCHLORIDE 5 MG/1
2.5-5 TABLET ORAL EVERY 4 HOURS PRN
Status: DISCONTINUED | OUTPATIENT
Start: 2023-05-10 | End: 2023-05-13

## 2023-05-10 RX ORDER — GAUZE BANDAGE 2" X 2"
100 BANDAGE TOPICAL DAILY
Status: COMPLETED | OUTPATIENT
Start: 2023-05-11 | End: 2023-05-12

## 2023-05-10 RX ADMIN — SODIUM PHOSPHATE, MONOBASIC, MONOHYDRATE AND SODIUM PHOSPHATE, DIBASIC, ANHYDROUS 15 MMOL: 276; 142 INJECTION, SOLUTION INTRAVENOUS at 07:34

## 2023-05-10 RX ADMIN — INSULIN LISPRO 3 UNITS: 100 INJECTION, SOLUTION INTRAVENOUS; SUBCUTANEOUS at 11:30

## 2023-05-10 RX ADMIN — OXYCODONE 5 MG: 5 TABLET ORAL at 11:20

## 2023-05-10 RX ADMIN — AMPICILLIN SODIUM AND SULBACTAM SODIUM 3 G: 2; 1 INJECTION, POWDER, FOR SOLUTION INTRAMUSCULAR; INTRAVENOUS at 05:37

## 2023-05-10 RX ADMIN — OXYCODONE 5 MG: 5 TABLET ORAL at 15:48

## 2023-05-10 RX ADMIN — PANTOPRAZOLE SODIUM 40 MG: 40 INJECTION, POWDER, FOR SOLUTION INTRAVENOUS at 05:22

## 2023-05-10 RX ADMIN — LEVETIRACETAM 500 MG: 500 TABLET, FILM COATED ORAL at 17:49

## 2023-05-10 RX ADMIN — AMPICILLIN SODIUM AND SULBACTAM SODIUM 3 G: 2; 1 INJECTION, POWDER, FOR SOLUTION INTRAMUSCULAR; INTRAVENOUS at 17:50

## 2023-05-10 RX ADMIN — FENTANYL CITRATE 50 MCG: 50 INJECTION INTRAMUSCULAR; INTRAVENOUS at 02:15

## 2023-05-10 RX ADMIN — DEXMEDETOMIDINE 0.2 MCG/KG/HR: 200 INJECTION, SOLUTION INTRAVENOUS at 20:18

## 2023-05-10 RX ADMIN — CALCIUM CHLORIDE 1000 MG: 100 INJECTION, SOLUTION INTRAVENOUS at 03:59

## 2023-05-10 RX ADMIN — FOLIC ACID 1 MG: 1 TABLET ORAL at 05:16

## 2023-05-10 RX ADMIN — FENTANYL CITRATE 50 MCG: 50 INJECTION INTRAMUSCULAR; INTRAVENOUS at 08:50

## 2023-05-10 RX ADMIN — INSULIN GLARGINE-YFGN 11 UNITS: 100 INJECTION, SOLUTION SUBCUTANEOUS at 17:48

## 2023-05-10 RX ADMIN — INSULIN LISPRO 3 UNITS: 100 INJECTION, SOLUTION INTRAVENOUS; SUBCUTANEOUS at 17:49

## 2023-05-10 RX ADMIN — THERA TABS 1 TABLET: TAB at 05:16

## 2023-05-10 RX ADMIN — OMEPRAZOLE 40 MG: 20 CAPSULE, DELAYED RELEASE ORAL at 20:15

## 2023-05-10 RX ADMIN — THIAMINE HYDROCHLORIDE 100 MG: 100 INJECTION, SOLUTION INTRAMUSCULAR; INTRAVENOUS at 10:10

## 2023-05-10 RX ADMIN — INSULIN LISPRO 3 UNITS: 100 INJECTION, SOLUTION INTRAVENOUS; SUBCUTANEOUS at 23:33

## 2023-05-10 RX ADMIN — LEVETIRACETAM 500 MG: 500 TABLET, FILM COATED ORAL at 05:16

## 2023-05-10 RX ADMIN — AMPICILLIN SODIUM AND SULBACTAM SODIUM 3 G: 2; 1 INJECTION, POWDER, FOR SOLUTION INTRAMUSCULAR; INTRAVENOUS at 11:27

## 2023-05-10 RX ADMIN — AMPICILLIN SODIUM AND SULBACTAM SODIUM 3 G: 2; 1 INJECTION, POWDER, FOR SOLUTION INTRAMUSCULAR; INTRAVENOUS at 23:29

## 2023-05-10 ASSESSMENT — PAIN DESCRIPTION - PAIN TYPE
TYPE: ACUTE PAIN

## 2023-05-10 ASSESSMENT — FIBROSIS 4 INDEX: FIB4 SCORE: 8.21

## 2023-05-10 ASSESSMENT — PULMONARY FUNCTION TESTS: FVC: 1.1

## 2023-05-10 NOTE — CARE PLAN
Problem: Humidified High Flow Nasal Cannula  Goal: Maintain adequate oxygenation dependent on patient condition  Description: Target End Date:  resolve prior to discharge or when underlying condition is resolved/stabilized    1.  Implement humidified high flow oxygen therapy  2.  Titrate high flow oxygen to maintain appropriate SpO2  Outcome: Progressing   HHFNC 30 L 70%

## 2023-05-10 NOTE — FLOWSHEET NOTE
05/10/23 0817   Weaning Parameters   RR (bpm) 18   $ FVC / Vital Capacity (liters)  1.1   NIF (cm H2O)  -23   Rapid Shallow Breathing Index (RR/VT) 33   Spontaneous VE 12.8   Spontaneous      Patient obeys commands
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
## 2023-05-10 NOTE — CARE PLAN
The patient is Watcher - Medium risk of patient condition declining or worsening    Shift Goals  Clinical Goals: MAP >65, improved neuro  Patient Goals: CELINA  Family Goals: CELINA    Progress made toward(s) clinical / shift goals:    Problem: Hemodynamics  Goal: Patient's hemodynamics, fluid balance and neurologic status will be stable or improve  Outcome: Progressing     Problem: Respiratory  Goal: Patient will achieve/maintain optimum respiratory ventilation and gas exchange  Outcome: Progressing     Problem: Pain - Standard  Goal: Alleviation of pain or a reduction in pain to the patient’s comfort goal  Outcome: Progressing       Patient is not progressing towards the following goals:

## 2023-05-10 NOTE — CARE PLAN
Problem: Ventilation  Goal: Ability to achieve and maintain unassisted ventilation or tolerate decreased levels of ventilator support  Description: Target End Date:  4 days     Document on Vent flowsheet    1.  Support and monitor invasive and noninvasive mechanical ventilation  2.  Monitor ventilator weaning response  3.  Perform ventilator associated pneumonia prevention interventions  4.  Manage ventilation therapy by monitoring diagnostic test results  Outcome: Not Met     Ventilator Daily Summary    Vent Day #3    ETT: 8.0 @ 24    Ventilator settings: cmv  28 450 8 40%  % 8 40%     Weaning trials: 1.7 Hrs     Respiratory Procedures:  none    Plan: Continue current ventilator settings and wean mechanical ventilation as tolerated per physician orders.

## 2023-05-10 NOTE — CARE PLAN
The patient is Watcher - Medium risk of patient condition declining or worsening    Shift Goals  Clinical Goals: Extubate  Patient Goals: CELINA  Family Goals: CELINA    Progress made toward(s) clinical / shift goals:  Pt extubated to Highflow    Patient is not progressing towards the following goals:      Problem: Knowledge Deficit - Standard  Goal: Patient and family/care givers will demonstrate understanding of plan of care, disease process/condition, diagnostic tests and medications  Note: POC discussed with patient, all questions answered, medication education completed, pt educated on disease process.      Problem: Pain - Standard  Goal: Alleviation of pain or a reduction in pain to the patient’s comfort goal  Note: Pt educated on 0/10 pain scale and to notify RN for pain

## 2023-05-10 NOTE — CARE PLAN
The patient is Watcher - Medium risk of patient condition declining or worsening    Shift Goals  Clinical Goals: Titrate off Norepinephrine and Vasopression with Map >65  Patient Goals: CELINA  Family Goals: CELINA    Progress made toward(s) clinical / shift goals: Pt. Is off Levophed and Vasopressin. Map has been >65     Patient is not progressing towards the following goals:      Problem: Hemodynamics  Goal: Patient's hemodynamics, fluid balance and neurologic status will be stable or improve  Description: Target End Date:  Prior to discharge or change in level of care    Document on Assessment and I/O flowsheet templates    1.  Monitor vital signs, pulse oximetry and cardiac monitor per provider order and/or policy  2.  Maintain blood pressure per provider order  3.  Hemodynamic monitoring per provider order  4.  Manage IV fluids and IV infusions  5.  Monitor intake and output  6.  Daily weights per unit policy or provider order  7.  Assess peripheral pulses and capillary refill  8.  Assess color and body temperature  9.  Position patient for maximum circulation/cardiac output  10. Monitor for signs/symptoms of excessive bleeding  11. Assess mental status, restlessness and changes in level of consciousness  12. Monitor temperature and report fever or hypothermia to provider immediately. Consideration of targeted temperature management.  Outcome: Progressing     Problem: Safety - Medical Restraint  Goal: Remains free of injury from restraints (Restraint for Interference with Medical Device)  Description: INTERVENTIONS:  1. Determine that other, less restrictive measures have been tried or would not be effective before applying the restraint  2. Evaluate the patient's condition at the time of restraint application  3. Educate patient/family regarding the reason for restraint  4. Q2H: Monitor safety, psychosocial status, comfort, circulation, respiratory status, LOC, nutrition and hydration  Outcome: Progressing  Goal:  Free from restraint(s) (Restraint for Interference with Medical Device)  Description: INTERVENTIONS:  1.  ONCE/SHIFT or MINIMUM Q12H: Assess and document the continuing need for restraints  2.  Q24H: Continued use of restraint requires LIP to perform face to face examination and written order  3.  Identify and implement measures to help patient regain control  4.  Educate patient/family on discontinuation criteria   5.  Assess patient's understanding and retention of education provided  6.  Assess readiness for release & initiate progressive release per protocol  7.  Identify and document criteria for restraints  Outcome: Progressing     Problem: Pain - Standard  Goal: Alleviation of pain or a reduction in pain to the patient’s comfort goal  Description: Target End Date:  Prior to discharge or change in level of care    Document on Vitals flowsheet    1.  Document pain using the appropriate pain scale per order or unit policy  2.  Educate and implement non-pharmacologic comfort measures (i.e. relaxation, distraction, massage, cold/heat therapy, etc.)  3.  Pain management medications as ordered  4.  Reassess pain after pain med administration per policy  5.  If opiods administered assess patient's response to pain medication is appropriate per POSS sedation scale  6.  Follow pain management plan developed in collaboration with patient and interdisciplinary team (including palliative care or pain specialists if applicable)  Outcome: Progressing

## 2023-05-11 LAB
ALBUMIN SERPL BCP-MCNC: 2 G/DL (ref 3.2–4.9)
ALBUMIN/GLOB SERPL: 0.8 G/DL
ALP SERPL-CCNC: 74 U/L (ref 30–99)
ALT SERPL-CCNC: 122 U/L (ref 2–50)
ANION GAP SERPL CALC-SCNC: 15 MMOL/L (ref 7–16)
AST SERPL-CCNC: 193 U/L (ref 12–45)
BASOPHILS # BLD AUTO: 0 % (ref 0–1.8)
BASOPHILS # BLD: 0 K/UL (ref 0–0.12)
BILIRUB SERPL-MCNC: 0.3 MG/DL (ref 0.1–1.5)
BUN SERPL-MCNC: 82 MG/DL (ref 8–22)
BURR CELLS BLD QL SMEAR: NORMAL
CALCIUM ALBUM COR SERPL-MCNC: 9.3 MG/DL (ref 8.5–10.5)
CALCIUM SERPL-MCNC: 7.7 MG/DL (ref 8.5–10.5)
CHLORIDE SERPL-SCNC: 105 MMOL/L (ref 96–112)
CO2 SERPL-SCNC: 16 MMOL/L (ref 20–33)
CREAT SERPL-MCNC: 3.7 MG/DL (ref 0.5–1.4)
EOSINOPHIL # BLD AUTO: 0 K/UL (ref 0–0.51)
EOSINOPHIL NFR BLD: 0 % (ref 0–6.9)
ERYTHROCYTE [DISTWIDTH] IN BLOOD BY AUTOMATED COUNT: 40.5 FL (ref 35.9–50)
GFR SERPLBLD CREATININE-BSD FMLA CKD-EPI: 21 ML/MIN/1.73 M 2
GLOBULIN SER CALC-MCNC: 2.6 G/DL (ref 1.9–3.5)
GLUCOSE BLD STRIP.AUTO-MCNC: 181 MG/DL (ref 65–99)
GLUCOSE BLD STRIP.AUTO-MCNC: 199 MG/DL (ref 65–99)
GLUCOSE BLD STRIP.AUTO-MCNC: 228 MG/DL (ref 65–99)
GLUCOSE BLD STRIP.AUTO-MCNC: 231 MG/DL (ref 65–99)
GLUCOSE BLD STRIP.AUTO-MCNC: 261 MG/DL (ref 65–99)
GLUCOSE SERPL-MCNC: 173 MG/DL (ref 65–99)
HCT VFR BLD AUTO: 22.8 % (ref 42–52)
HGB BLD-MCNC: 8.4 G/DL (ref 14–18)
LYMPHOCYTES # BLD AUTO: 0.21 K/UL (ref 1–4.8)
LYMPHOCYTES NFR BLD: 2.6 % (ref 22–41)
MAGNESIUM SERPL-MCNC: 2 MG/DL (ref 1.5–2.5)
MANUAL DIFF BLD: NORMAL
MCH RBC QN AUTO: 31.7 PG (ref 27–33)
MCHC RBC AUTO-ENTMCNC: 36.8 G/DL (ref 33.7–35.3)
MCV RBC AUTO: 86 FL (ref 81.4–97.8)
METAMYELOCYTES NFR BLD MANUAL: 4.3 %
MONOCYTES # BLD AUTO: 0.83 K/UL (ref 0–0.85)
MONOCYTES NFR BLD AUTO: 10.3 % (ref 0–13.4)
MORPHOLOGY BLD-IMP: NORMAL
NEUTROPHILS # BLD AUTO: 6.71 K/UL (ref 1.82–7.42)
NEUTROPHILS NFR BLD: 75 % (ref 44–72)
NEUTS BAND NFR BLD MANUAL: 7.8 % (ref 0–10)
NRBC # BLD AUTO: 0 K/UL
NRBC BLD-RTO: 0 /100 WBC
NSE SERPL IA-MCNC: 21.5 NG/ML
PHOSPHATE SERPL-MCNC: 2.6 MG/DL (ref 2.5–4.5)
PLATELET # BLD AUTO: 48 K/UL (ref 164–446)
PLATELET BLD QL SMEAR: NORMAL
PMV BLD AUTO: 11.4 FL (ref 9–12.9)
POIKILOCYTOSIS BLD QL SMEAR: NORMAL
POTASSIUM SERPL-SCNC: 3.2 MMOL/L (ref 3.6–5.5)
PROT SERPL-MCNC: 4.6 G/DL (ref 6–8.2)
RBC # BLD AUTO: 2.65 M/UL (ref 4.7–6.1)
RBC BLD AUTO: PRESENT
SODIUM SERPL-SCNC: 136 MMOL/L (ref 135–145)
TARGETS BLD QL SMEAR: NORMAL
WBC # BLD AUTO: 8.1 K/UL (ref 4.8–10.8)

## 2023-05-11 PROCEDURE — 85025 COMPLETE CBC W/AUTO DIFF WBC: CPT

## 2023-05-11 PROCEDURE — 85007 BL SMEAR W/DIFF WBC COUNT: CPT

## 2023-05-11 PROCEDURE — A9270 NON-COVERED ITEM OR SERVICE: HCPCS | Performed by: INTERNAL MEDICINE

## 2023-05-11 PROCEDURE — 99291 CRITICAL CARE FIRST HOUR: CPT | Mod: GC | Performed by: INTERNAL MEDICINE

## 2023-05-11 PROCEDURE — 770022 HCHG ROOM/CARE - ICU (200)

## 2023-05-11 PROCEDURE — 80053 COMPREHEN METABOLIC PANEL: CPT

## 2023-05-11 PROCEDURE — 700102 HCHG RX REV CODE 250 W/ 637 OVERRIDE(OP): Performed by: INTERNAL MEDICINE

## 2023-05-11 PROCEDURE — 92612 ENDOSCOPY SWALLOW (FEES) VID: CPT

## 2023-05-11 PROCEDURE — 84100 ASSAY OF PHOSPHORUS: CPT

## 2023-05-11 PROCEDURE — 83735 ASSAY OF MAGNESIUM: CPT

## 2023-05-11 PROCEDURE — 94640 AIRWAY INHALATION TREATMENT: CPT

## 2023-05-11 PROCEDURE — A9270 NON-COVERED ITEM OR SERVICE: HCPCS | Performed by: STUDENT IN AN ORGANIZED HEALTH CARE EDUCATION/TRAINING PROGRAM

## 2023-05-11 PROCEDURE — 92610 EVALUATE SWALLOWING FUNCTION: CPT

## 2023-05-11 PROCEDURE — 82962 GLUCOSE BLOOD TEST: CPT | Mod: 91

## 2023-05-11 PROCEDURE — 700105 HCHG RX REV CODE 258: Performed by: INTERNAL MEDICINE

## 2023-05-11 PROCEDURE — 700102 HCHG RX REV CODE 250 W/ 637 OVERRIDE(OP): Performed by: STUDENT IN AN ORGANIZED HEALTH CARE EDUCATION/TRAINING PROGRAM

## 2023-05-11 PROCEDURE — 700111 HCHG RX REV CODE 636 W/ 250 OVERRIDE (IP): Performed by: INTERNAL MEDICINE

## 2023-05-11 RX ADMIN — INSULIN LISPRO 5 UNITS: 100 INJECTION, SOLUTION INTRAVENOUS; SUBCUTANEOUS at 23:34

## 2023-05-11 RX ADMIN — OXYCODONE 5 MG: 5 TABLET ORAL at 20:36

## 2023-05-11 RX ADMIN — AMPICILLIN SODIUM AND SULBACTAM SODIUM 3 G: 2; 1 INJECTION, POWDER, FOR SOLUTION INTRAMUSCULAR; INTRAVENOUS at 05:23

## 2023-05-11 RX ADMIN — THERA TABS 1 TABLET: TAB at 06:31

## 2023-05-11 RX ADMIN — INSULIN LISPRO 5 UNITS: 100 INJECTION, SOLUTION INTRAVENOUS; SUBCUTANEOUS at 12:55

## 2023-05-11 RX ADMIN — LEVETIRACETAM 500 MG: 500 TABLET, FILM COATED ORAL at 06:31

## 2023-05-11 RX ADMIN — LEVETIRACETAM 500 MG: 500 TABLET, FILM COATED ORAL at 17:40

## 2023-05-11 RX ADMIN — AMPICILLIN SODIUM AND SULBACTAM SODIUM 3 G: 2; 1 INJECTION, POWDER, FOR SOLUTION INTRAMUSCULAR; INTRAVENOUS at 12:55

## 2023-05-11 RX ADMIN — POTASSIUM BICARBONATE 25 MEQ: 978 TABLET, EFFERVESCENT ORAL at 10:17

## 2023-05-11 RX ADMIN — OXYCODONE 5 MG: 5 TABLET ORAL at 16:36

## 2023-05-11 RX ADMIN — INSULIN LISPRO 2 UNITS: 100 INJECTION, SOLUTION INTRAVENOUS; SUBCUTANEOUS at 17:41

## 2023-05-11 RX ADMIN — OMEPRAZOLE 40 MG: 20 CAPSULE, DELAYED RELEASE ORAL at 17:41

## 2023-05-11 RX ADMIN — AMPICILLIN SODIUM AND SULBACTAM SODIUM 3 G: 2; 1 INJECTION, POWDER, FOR SOLUTION INTRAMUSCULAR; INTRAVENOUS at 17:41

## 2023-05-11 RX ADMIN — FOLIC ACID 1 MG: 1 TABLET ORAL at 06:30

## 2023-05-11 RX ADMIN — Medication 100 MG: at 06:30

## 2023-05-11 RX ADMIN — OXYCODONE 5 MG: 5 TABLET ORAL at 02:31

## 2023-05-11 RX ADMIN — OXYCODONE 5 MG: 5 TABLET ORAL at 10:17

## 2023-05-11 RX ADMIN — AMPICILLIN SODIUM AND SULBACTAM SODIUM 3 G: 2; 1 INJECTION, POWDER, FOR SOLUTION INTRAMUSCULAR; INTRAVENOUS at 23:45

## 2023-05-11 RX ADMIN — INSULIN LISPRO 2 UNITS: 100 INJECTION, SOLUTION INTRAVENOUS; SUBCUTANEOUS at 05:11

## 2023-05-11 RX ADMIN — INSULIN GLARGINE-YFGN 11 UNITS: 100 INJECTION, SOLUTION SUBCUTANEOUS at 17:41

## 2023-05-11 ASSESSMENT — ENCOUNTER SYMPTOMS
ABDOMINAL PAIN: 0
DIZZINESS: 0
MYALGIAS: 1
SHORTNESS OF BREATH: 0
FEVER: 0
HEADACHES: 0
CHILLS: 0
WHEEZING: 0
COUGH: 1
VOMITING: 0
BLOOD IN STOOL: 1
WEAKNESS: 0
HEMOPTYSIS: 0
PALPITATIONS: 0
NAUSEA: 0

## 2023-05-11 ASSESSMENT — FIBROSIS 4 INDEX: FIB4 SCORE: 13.11

## 2023-05-11 ASSESSMENT — PAIN DESCRIPTION - PAIN TYPE
TYPE: ACUTE PAIN

## 2023-05-11 NOTE — DIETARY
Nutrition Services Brief Update:    Problem: Nutritional:  Goal: Nutrition support tolerated and meeting greater than 85% of estimated needs  Outcome: MET. TF @ goal.    RD following.

## 2023-05-11 NOTE — THERAPY
"Speech Language Pathology   Clinical Swallow Evaluation     Patient Name: Cory Marmolejo  AGE:  37 y.o., SEX:  male  Medical Record #: [id number]  Date of Service: 5/11/2023      History of Present Illness  38 y/o male presetned 5/7 from [facility] following witnessed seizure. EMS found pt pulseless and unresponsive and completed CPR for ~45 min. VEEG 5/8. Bronch 5/8. Intubated 5/7-5/10.    No hx SLP in Epic.     CMHx: cardiac arrest, chronic liver failure, seizure, acute respiratory failure w hypoxia, diabetic ketoacidosis w coma, LEIGH, TBI, shock, rhabdo  PMHx: DM2, seizure disorder, malnutrition, marijuana use, alcohol use    CXR 5/10:  \"1.  Pulmonary edema and/or infiltrates are identified, which appear somewhat increased since the prior exam.  2.  Small bilateral pleural effusions, left greater than right, increased on the left compared to prior study.\"    General Information:  Vitals  O2 (LPM): 30  O2 Delivery Device: Heated High Flow Nasal Cannula  Vitals Comments: 40% FiO2  Level of Consciousness: Alert, Awake  Patient Behaviors: Forgetful  Orientation: Oriented to all but month; stated it was February Follows Directives: Yes - simple commands only      Prior Living Situation & Level of Function:  Communication: WFL  Swallowing: WFL w frequent reflux. Pt reports medical management of reflux sx       Oral Mechanism Evaluation:  Dentition: Fair   Facial Symmetry: Equal  Facial Sensation: Not tested     Labial Observations: Bilateral weakness   Lingual Observations: Midline  Motor Speech: Mild dysarthria without notable impact on intelligibility      Laryngeal Function:  Secretion Management:  (Suction present; pt reports use of Y[place]auer to after cough to suction secretions)  Voice Quality: Harsh  Cough: Perceptually WNL         Subjective  Pt agreeable and cooperative with SLP evaluation tasks; did become disappointed/frustrated when told recommendation for FEES - \"So you're not giving me water?\" Likely " need reinforcement of recommendations. Of note - was drinking water from green swab cup upon SLP arrival. Also reports completing oral care just prior to SLP evaluation.      Assessment  Current Method of Nutrition: NGT  Positioning: Sitting UIC  Bolus Administration: SLP, Patient  O2 (LPM): 30 O2 Delivery Device: Heated High Flow Nasal Cannula  Factor(s) Affecting Performance: Impaired mental status  Tracheostomy : No      Swallowing Trials:  Ice: Impaired  Thin Liquid (TN0): Impaired  Pureed (PU4): Impaired      Comments:   Some anterior bolus loss (?drooling) from center of lips following trials of TN0 and PU4. Notable impulsivity w PO and poor demonstrated understanding of aspiration risk. Following ice chips and small sip TN0, pt with ongoing and immediate clear/cough responses, concerning for airway invasion and inability to clear invasion. Discussed FEES w pt who is frustrated not be eating now but is agreeable to further evaluation.     Clinical Impressions  Pt presents with clinical indicators of and elevated risk for oropharyngeal dysphagia given intubation, hx TBI, and impulsivity. Pt would benefit from further evaluation of swallow using FEES prior to meaningful initiation of PO. Pt may have ice chips with RN using precautions as outlined below to prevent further atrophy and xerostomia and to aid with secretion management.       Recommendations  Diet Consistency: NPO/NGT pending FEES  Instrumentation: FEES  Medication: Non Oral  Oral Care: Q4h     Swallowing Instructions & Precautions For Ice Chips with RN:  Oral Care PRIOR to ice chips: Q4h  Supervision: 1:1 with trained staff  Positioning: HOB at 90*  Strategies: One at a time via tsp, after oral care only !!      SLP Treatment Plan  Treatment Plan: Dysphagia Treatment, Patient/Family/Caregiver Training  SLP Frequency: 3x Per Week  Estimated Duration: Until Therapy Goals Met      Anticipated Discharge Needs  Discharge Recommendations: Recommend  "post-acute placement for additional speech therapy services prior to discharge home   Therapy Recommendations Upon DC: Dysphagia Training, Patient / Family / Caregiver Education, Community Re-Integration        Patient / Family Goals  Patient / Family Goal #1: \"Can I have more water?\"  Short Term Goals  Short Term Goal # 1: Pt will complete FEES w SLP to further evaluate swallow function and determine POC.      Kalani Hassan, SLP   "

## 2023-05-11 NOTE — THERAPY
"Speech Language Pathology   Flexible Endoscopic Evaluation of Swallowing (FEES)        Patient Name: Cory Marmolejo  AGE:  37 y.o., SEX:  male  Medical Record #: [id number]  Date of Service: 5/11/2023      History of Present Illness  38 y/o male presetned 5/7 from [facility] following witnessed seizure. EMS found pt pulseless and unresponsive and completed CPR for ~45 min. VEEG 5/8. Bronch 5/8. Intubated 5/7-5/10.    CMHx: cardiac arrest, chronic liver failure, seizure, acute respiratory failure w hypoxia, diabetic ketoacidosis w coma, LEIGH, TBI, shock, rhabdo  PMHx: DM2, seizure disorder, malnutrition, marijuana use, alcohol use    CXR 5/10:  \"1.  Pulmonary edema and/or infiltrates are identified, which appear somewhat increased since the prior exam.  2.  Small bilateral pleural effusions, left greater than right, increased on the left compared to prior study.\"    Pertinent Information  Current Method of Nutrition: NGT  Patient Behaviors: Forgetful   Dentition: Fair   Feeding Tube: NGT intact to right nare   Tracheostomy: No   Factor(s) Affecting Performance: Impaired command following     Discussed the risks, benefits, and alternatives of the FEES procedure. Patient/family acknowledged and agreed to proceed. Study completed under the supervision of Kina Boateng CCC-SLP.       Assessment  Flexible Endoscopic Evaluation of Swallowing (FEES) completed at bedside today. The endoscope was passed transnasally via Left nare to evaluate the anatomy and physiology of swallowing. Pt tolerated the procedure with no apparent distress.    Anatomic Findings: Bilateral white space-occupying lesions on posterior TVFs, edematous arytenoids, reddening of laryngeal vestibule, all of which can be consistent w emergent endotracheal intubation. Edema of arytenoids can also be consistent w LPR given pt's history of reflux.   Vocal Fold Motion: Bilateral movement  Secretion Management: Adequate  PO Trials: Ice Chips, Thin Liquid, " Mildly Thick Liquid, Liquidised, Soft & Bite Sized, Pudding, Mixed      Consistency PAS Score Timing Residue Comments   Thin Liquid 8 During swallow, Post swallow Vallecular Residue: Mild (5%-25%)  Pyriform Sinus Residue: Moderate (25%-50%) Tsp - Pas 1  Cup - PAS 8 repeatedly on residue  Tsp 3sec prep - PAS 1  Cup 3sec prep - PAS 5   Mildly Thick 3 (trace) During Swallow Vallecular Residue: Mild (5%-25%)  Pyriform Sinus Residue: Moderate (25%-50%) Tsp, cup - PAS 1  Straw - trace PAS 3   Liquidised 1 N/A Vallecular Residue: Mild (5%-25%)  Pyriform Sinus Residue: Mild (5%-25%)    Pudding 4 During Swallow Vallecular Residue: Mild (5%-25%)  Pyriform Sinus Residue: Moderate (25%-50%)    Soft & Bite Sized 1 N/A Vallecular Residue: Severe (>50%)  Pyriform Sinus Residue: Trace (1%-5%)    Mixed 1 N/A Vallecular Residue: Trace (1%-5%)  Pyriform Sinus Residue: Severe (>50%)      Penetration-Aspiration Scale (PAS)  1     No contrast enters airway  2     Contrast enters the airway, remains above the vocal folds, and is ejected from the airway (not seen in the airway at the end of the swallow).  3     Contrast enters the airway, remains above the vocal folds, and is not ejected from the airway (is seen in the airway after the swallow).  4     Contrast enters the airway, contacts the vocal folds, and is ejected from the airway.  5     Contrast enters the airway, contacts the vocal folds, and is not ejected from the airway  6     Contrast enters the airway, crosses the plane of the vocal folds, and is ejected from the airway.  7     Contrast enters the airway, crosses the plane of the vocal folds, and is not ejected from the airway despite effort.  8     Contrast enters the airway, crosses the plane of the vocal folds, is not ejected from the airway and there is no response to aspiration.      Oral phase:  Oral bolus stripping WFL; containment fair, noted at bedside to have some anterior bolus loss (?drooling) from center of lips.  Mastication of soft solid was incomplete, resulting in full peach remaining in vallecula after the swallow. RG7 trials not attempted for this reason. Swallow initiation at the level of pyriform sinuses; this can be a variation of normal.        Pharyngeal phase:  Pharyngeal phase characterized by impairments in pharyngeal sensation, BOT retraction, pharyngeal shortening, pharyngeal constriction, LVC closure, and epiglottic inversion which resulted in the following:  - Aspiration of TN0 after the swallow that would clear w cued cough and be subsequently re-aspirated over the inter-arytenoid space. Query aspiration of refluxed material as well as there appeared to be a greater amount of aspirate in subsequent swallows than initially visualized. Also query aspiration of secretions as pt had cued cough that was productive for material prior to trials presented on study  - Deep penetration (to the vocal folds) with PU4 and TN0 by cup inferred during the swallow. Deep penetration of TN0 was not prevented with strategy use. Penetrate did clear from the airway using spontaneous cough with PU4 but required cued cough with TN0.   - Penetration with MT2 by straw inferred during the swallow in trace amounts  - Severe vallecular residue with SB6 and mixed that did not clear spontaneously  - Moderate pyriform sinus residue with TN0, MT2, and PU4 that did not clear spontaneously  - Severe PPW sinus residue with SB6/mixed and mild-moderate PPW residue with all other consistencies trialed that did not clear spontaneously. Do suspect that PPW residue was somewhat worsened by the presence of NG against PPW  - Pt was not consistently sensate to airway invasion  - Pt was not sensate to residue      Esophageal phase:  Unable to visualize esophagus; however, especially given pt report of reflux hx and ongoing alcohol use, concern for esophageal dysphagia and possible LPR is present. Pt had bubbling in the post-cricoid space/UES at the  beginning of the study and after trials of TN0, MT2, and PU4. Query aspiration of refluxed material vs. pyriform sinus residue as well as there appeared to be a greater amount of aspirate in subsequent swallows than initially visualized.      Compensatory Strategies:  Effortful swallow - SOMEWHAT EFFECTIVE to reduce pharyngeal residue  Multiple swallows - SOMEWHAT EFFECTIVE to reduce pharyngeal residue  3sec prep - SOMEWHAT EFFECTIVE to reduce but not prevent airway invasion w TN0  Liquid wash - EFFECTIVE to clear residue with SB6  Throat clear, cleansing swallow - EFFECTIVE to clear airway invasion. HOWEVER, pt was not able to consistently complete strategy and would re-aspirate material      Severity Rating:  DANNI: Moderate      Clinical Impressions  The pt presents with a moderate oropharyngeal dysphagia with component of esophageal dysphagia. Pt's risk for ascending and descending aspiration and related sequelae is elevated. Dysphagia is likely acute on chronic related to intubation, cardiac arrest w complications, hx TBI, and hx reflux. Would recommend minced and moist solids with mildly thick liquids at this with use of 1:1 spv given impulsivity, strategy use, and strict reflux precautions. Pt will likely need repeat diagnostic evaluation prior to upgrading diet given silent aspiration of TN0 and inconsistent pharyngeal sensation. Pt appears to be a fair candidate for ongoing behavorial and exercise-based swallow rehabilitation. Dysphagia outcomes can be maximized with use of mobility as pt is able and frequent, thorough oral care.       Recommendations  Minced and moist with mildly thick liquids - STRICT REFLUX PRECAUTIONS  Medication: Crush with applesauce, as appropriate  Supervision: 1:1 feeding with constant supervision, Monitor due to impulsive behavior  Positioning: Fully upright and midline during oral intake, Remain upright for 30-45 minutes after oral intake, Meals sitting upright in a chair, as  "tolerated  Strategies: Small bites/sips, Alternate bites and sips, Slow rate of intake, Multiple swallows (2-3) per bite/sips, Reduce environmental distractions  Oral Care: Q6h  Additional Instrumentation: Repeat diagnostic study when clinically appropriate (MBSS if able)         SLP Treatment Plan  Treatment Plan: Dysphagia Treatment, Patient/Family/Caregiver Training  SLP Frequency: 3x Per Week  Estimated Duration: Until Therapy Goals Met      Anticipated Discharge Needs  Discharge Recommendations: Recommend post-acute placement for additional speech therapy services prior to discharge home   Therapy Recommendations Upon DC: Dysphagia Training, Patient / Family / Caregiver Education, Community Re-Integration       Patient / Family Goals  Patient / Family Goal #1: \"Can I have more water?\"  Goal #1 Outcome: Progressing slower than expected  Short Term Goal # 1: Pt will complete FEES w SLP to further evaluate swallow function and determine POC.  Goal Outcome # 1: Goal met, new goal added  Short Term Goal # 1 B : Pt will consume diet of mm/mt given 1:1 spv from staff and strict reflux precautions.  Short Term Goal # 2: Pt will complete exercises targeting BOT retraction, pharyngeal shortening, pharyngeal constriction, LVC closure, and epiglottic inversion x20 in a session given mod cues from SLP w \"good\" accuracy.  Short Term Goal # 3: Pt and family will verbalize/demonstrate 5 precautions/concerns related to pt's dysphagia given min cues from SLP.      Kalani Hassan, SLP  " [FreeTextEntry1] : Katlin is an alert, comfortable, well-developed, in no distress, 1 1/2-year-old girl. She has a well fitting and intact right LAC  cast. Her skin is intact around the edges. Neurovascularly grossly intact.

## 2023-05-11 NOTE — CARE PLAN
Problem: Knowledge Deficit - Standard  Goal: Patient and family/care givers will demonstrate understanding of plan of care, disease process/condition, diagnostic tests and medications  Outcome: Progressing     Problem: Hemodynamics  Goal: Patient's hemodynamics, fluid balance and neurologic status will be stable or improve  Outcome: Progressing     Problem: Respiratory  Goal: Patient will achieve/maintain optimum respiratory ventilation and gas exchange  Outcome: Progressing     Problem: Risk for Aspiration  Goal: Patient's risk for aspiration will be absent or decrease  Outcome: Progressing     Problem: Fall Risk  Goal: Patient will remain free from falls  Outcome: Progressing     Problem: Pain - Standard  Goal: Alleviation of pain or a reduction in pain to the patient’s comfort goal  Outcome: Progressing     Problem: Skin Integrity  Goal: Skin integrity is maintained or improved  Outcome: Progressing   The patient is Stable - Low risk of patient condition declining or worsening    Shift Goals  Clinical Goals: wean O2 as tolerated  Patient Goals: rest/ sleep  Family Goals: CELINA    Progress made toward(s) clinical / shift goals:      Patient is not progressing towards the following goals:

## 2023-05-11 NOTE — WOUND TEAM
Wound consult received for BMS insertion. Discussed with bedside RN. Patient ambulatory and using bathroom. BMS no longer needed. Consult discontinued.
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
## 2023-05-12 ENCOUNTER — APPOINTMENT (OUTPATIENT)
Dept: RADIOLOGY | Facility: MEDICAL CENTER | Age: 38
DRG: 208 | End: 2023-05-12
Attending: HOSPITALIST
Payer: MEDICAID

## 2023-05-12 ENCOUNTER — APPOINTMENT (OUTPATIENT)
Dept: RADIOLOGY | Facility: REHABILITATION | Age: 38
DRG: 208 | End: 2023-05-12
Attending: HOSPITALIST
Payer: MEDICAID

## 2023-05-12 LAB
ALBUMIN SERPL BCP-MCNC: 2.1 G/DL (ref 3.2–4.9)
ALBUMIN/GLOB SERPL: 0.7 G/DL
ALP SERPL-CCNC: 105 U/L (ref 30–99)
ALT SERPL-CCNC: 127 U/L (ref 2–50)
ANION GAP SERPL CALC-SCNC: 13 MMOL/L (ref 7–16)
ANION GAP SERPL CALC-SCNC: 17 MMOL/L (ref 7–16)
AST SERPL-CCNC: 140 U/L (ref 12–45)
BASOPHILS # BLD AUTO: 0 % (ref 0–1.8)
BASOPHILS # BLD: 0 K/UL (ref 0–0.12)
BILIRUB SERPL-MCNC: 0.3 MG/DL (ref 0.1–1.5)
BUN SERPL-MCNC: 65 MG/DL (ref 8–22)
BUN SERPL-MCNC: 69 MG/DL (ref 8–22)
CALCIUM ALBUM COR SERPL-MCNC: 9.3 MG/DL (ref 8.5–10.5)
CALCIUM SERPL-MCNC: 7.8 MG/DL (ref 8.5–10.5)
CALCIUM SERPL-MCNC: 7.8 MG/DL (ref 8.5–10.5)
CHLORIDE SERPL-SCNC: 95 MMOL/L (ref 96–112)
CHLORIDE SERPL-SCNC: 97 MMOL/L (ref 96–112)
CO2 SERPL-SCNC: 19 MMOL/L (ref 20–33)
CO2 SERPL-SCNC: 19 MMOL/L (ref 20–33)
CREAT SERPL-MCNC: 3.13 MG/DL (ref 0.5–1.4)
CREAT SERPL-MCNC: 3.18 MG/DL (ref 0.5–1.4)
EOSINOPHIL # BLD AUTO: 0.14 K/UL (ref 0–0.51)
EOSINOPHIL NFR BLD: 0.9 % (ref 0–6.9)
ERYTHROCYTE [DISTWIDTH] IN BLOOD BY AUTOMATED COUNT: 41.6 FL (ref 35.9–50)
EST. AVERAGE GLUCOSE BLD GHB EST-MCNC: 361 MG/DL
GFR SERPLBLD CREATININE-BSD FMLA CKD-EPI: 25 ML/MIN/1.73 M 2
GFR SERPLBLD CREATININE-BSD FMLA CKD-EPI: 25 ML/MIN/1.73 M 2
GLOBULIN SER CALC-MCNC: 3.1 G/DL (ref 1.9–3.5)
GLUCOSE BLD STRIP.AUTO-MCNC: 131 MG/DL (ref 65–99)
GLUCOSE BLD STRIP.AUTO-MCNC: 132 MG/DL (ref 65–99)
GLUCOSE BLD STRIP.AUTO-MCNC: 147 MG/DL (ref 65–99)
GLUCOSE BLD STRIP.AUTO-MCNC: 165 MG/DL (ref 65–99)
GLUCOSE BLD STRIP.AUTO-MCNC: 297 MG/DL (ref 65–99)
GLUCOSE SERPL-MCNC: 141 MG/DL (ref 65–99)
GLUCOSE SERPL-MCNC: 190 MG/DL (ref 65–99)
HBA1C MFR BLD: 14.2 % (ref 4–5.6)
HCT VFR BLD AUTO: 24.3 % (ref 42–52)
HGB BLD-MCNC: 8.9 G/DL (ref 14–18)
LYMPHOCYTES # BLD AUTO: 0.26 K/UL (ref 1–4.8)
LYMPHOCYTES NFR BLD: 1.7 % (ref 22–41)
MAGNESIUM SERPL-MCNC: 1.8 MG/DL (ref 1.5–2.5)
MANUAL DIFF BLD: NORMAL
MCH RBC QN AUTO: 31.6 PG (ref 27–33)
MCHC RBC AUTO-ENTMCNC: 36.6 G/DL (ref 33.7–35.3)
MCV RBC AUTO: 86.2 FL (ref 81.4–97.8)
METAMYELOCYTES NFR BLD MANUAL: 0.9 %
MONOCYTES # BLD AUTO: 0.65 K/UL (ref 0–0.85)
MONOCYTES NFR BLD AUTO: 4.3 % (ref 0–13.4)
MORPHOLOGY BLD-IMP: NORMAL
NEUTROPHILS # BLD AUTO: 13.83 K/UL (ref 1.82–7.42)
NEUTROPHILS NFR BLD: 92.2 % (ref 44–72)
NRBC # BLD AUTO: 0 K/UL
NRBC BLD-RTO: 0 /100 WBC
PHOSPHATE SERPL-MCNC: 3.2 MG/DL (ref 2.5–4.5)
PLATELET # BLD AUTO: 48 K/UL (ref 164–446)
PLATELET BLD QL SMEAR: NORMAL
PMV BLD AUTO: 11.3 FL (ref 9–12.9)
POIKILOCYTOSIS BLD QL SMEAR: NORMAL
POTASSIUM SERPL-SCNC: 2.7 MMOL/L (ref 3.6–5.5)
POTASSIUM SERPL-SCNC: 2.9 MMOL/L (ref 3.6–5.5)
PROT SERPL-MCNC: 5.2 G/DL (ref 6–8.2)
RBC # BLD AUTO: 2.82 M/UL (ref 4.7–6.1)
RBC BLD AUTO: PRESENT
SODIUM SERPL-SCNC: 129 MMOL/L (ref 135–145)
SODIUM SERPL-SCNC: 131 MMOL/L (ref 135–145)
TARGETS BLD QL SMEAR: NORMAL
WBC # BLD AUTO: 15 K/UL (ref 4.8–10.8)

## 2023-05-12 PROCEDURE — 76700 US EXAM ABDOM COMPLETE: CPT

## 2023-05-12 PROCEDURE — 85007 BL SMEAR W/DIFF WBC COUNT: CPT

## 2023-05-12 PROCEDURE — A9270 NON-COVERED ITEM OR SERVICE: HCPCS | Performed by: STUDENT IN AN ORGANIZED HEALTH CARE EDUCATION/TRAINING PROGRAM

## 2023-05-12 PROCEDURE — 700102 HCHG RX REV CODE 250 W/ 637 OVERRIDE(OP): Performed by: STUDENT IN AN ORGANIZED HEALTH CARE EDUCATION/TRAINING PROGRAM

## 2023-05-12 PROCEDURE — 83735 ASSAY OF MAGNESIUM: CPT

## 2023-05-12 PROCEDURE — 700102 HCHG RX REV CODE 250 W/ 637 OVERRIDE(OP): Performed by: INTERNAL MEDICINE

## 2023-05-12 PROCEDURE — 700105 HCHG RX REV CODE 258: Performed by: INTERNAL MEDICINE

## 2023-05-12 PROCEDURE — A9270 NON-COVERED ITEM OR SERVICE: HCPCS | Performed by: INTERNAL MEDICINE

## 2023-05-12 PROCEDURE — 770020 HCHG ROOM/CARE - TELE (206)

## 2023-05-12 PROCEDURE — 99255 IP/OBS CONSLTJ NEW/EST HI 80: CPT | Performed by: HOSPITALIST

## 2023-05-12 PROCEDURE — 84100 ASSAY OF PHOSPHORUS: CPT

## 2023-05-12 PROCEDURE — 80053 COMPREHEN METABOLIC PANEL: CPT

## 2023-05-12 PROCEDURE — 82962 GLUCOSE BLOOD TEST: CPT

## 2023-05-12 PROCEDURE — 700101 HCHG RX REV CODE 250: Performed by: HOSPITALIST

## 2023-05-12 PROCEDURE — 700102 HCHG RX REV CODE 250 W/ 637 OVERRIDE(OP): Performed by: HOSPITALIST

## 2023-05-12 PROCEDURE — 83036 HEMOGLOBIN GLYCOSYLATED A1C: CPT

## 2023-05-12 PROCEDURE — 700111 HCHG RX REV CODE 636 W/ 250 OVERRIDE (IP): Performed by: INTERNAL MEDICINE

## 2023-05-12 PROCEDURE — A9270 NON-COVERED ITEM OR SERVICE: HCPCS | Performed by: HOSPITALIST

## 2023-05-12 PROCEDURE — 85025 COMPLETE CBC W/AUTO DIFF WBC: CPT

## 2023-05-12 PROCEDURE — 80048 BASIC METABOLIC PNL TOTAL CA: CPT

## 2023-05-12 RX ORDER — CHOLECALCIFEROL (VITAMIN D3) 125 MCG
5 CAPSULE ORAL NIGHTLY PRN
Status: DISCONTINUED | OUTPATIENT
Start: 2023-05-12 | End: 2023-05-12

## 2023-05-12 RX ORDER — POTASSIUM CHLORIDE 20 MEQ/1
40 TABLET, EXTENDED RELEASE ORAL ONCE
Status: COMPLETED | OUTPATIENT
Start: 2023-05-12 | End: 2023-05-12

## 2023-05-12 RX ORDER — LIDOCAINE 50 MG/G
2 PATCH TOPICAL EVERY 24 HOURS
Status: DISCONTINUED | OUTPATIENT
Start: 2023-05-12 | End: 2023-05-16 | Stop reason: HOSPADM

## 2023-05-12 RX ORDER — CHOLECALCIFEROL (VITAMIN D3) 125 MCG
5 CAPSULE ORAL NIGHTLY PRN
Status: DISCONTINUED | OUTPATIENT
Start: 2023-05-12 | End: 2023-05-13

## 2023-05-12 RX ADMIN — LEVETIRACETAM 500 MG: 500 TABLET, FILM COATED ORAL at 17:23

## 2023-05-12 RX ADMIN — Medication 5 MG: at 02:29

## 2023-05-12 RX ADMIN — THERA TABS 1 TABLET: TAB at 05:00

## 2023-05-12 RX ADMIN — FOLIC ACID 1 MG: 1 TABLET ORAL at 05:00

## 2023-05-12 RX ADMIN — OXYCODONE 5 MG: 5 TABLET ORAL at 17:26

## 2023-05-12 RX ADMIN — Medication 100 MG: at 05:00

## 2023-05-12 RX ADMIN — LEVETIRACETAM 500 MG: 500 TABLET, FILM COATED ORAL at 05:00

## 2023-05-12 RX ADMIN — POTASSIUM BICARBONATE 50 MEQ: 978 TABLET, EFFERVESCENT ORAL at 05:27

## 2023-05-12 RX ADMIN — AMPICILLIN SODIUM AND SULBACTAM SODIUM 3 G: 2; 1 INJECTION, POWDER, FOR SOLUTION INTRAMUSCULAR; INTRAVENOUS at 05:03

## 2023-05-12 RX ADMIN — OXYCODONE 5 MG: 5 TABLET ORAL at 00:38

## 2023-05-12 RX ADMIN — INSULIN GLARGINE-YFGN 11 UNITS: 100 INJECTION, SOLUTION SUBCUTANEOUS at 17:22

## 2023-05-12 RX ADMIN — AMPICILLIN SODIUM AND SULBACTAM SODIUM 3 G: 2; 1 INJECTION, POWDER, FOR SOLUTION INTRAMUSCULAR; INTRAVENOUS at 12:13

## 2023-05-12 RX ADMIN — AMPICILLIN SODIUM AND SULBACTAM SODIUM 3 G: 2; 1 INJECTION, POWDER, FOR SOLUTION INTRAMUSCULAR; INTRAVENOUS at 17:28

## 2023-05-12 RX ADMIN — OXYCODONE 5 MG: 5 TABLET ORAL at 13:29

## 2023-05-12 RX ADMIN — OXYCODONE 5 MG: 5 TABLET ORAL at 21:33

## 2023-05-12 RX ADMIN — POTASSIUM CHLORIDE 40 MEQ: 1500 TABLET, EXTENDED RELEASE ORAL at 14:29

## 2023-05-12 RX ADMIN — SENNOSIDES AND DOCUSATE SODIUM 2 TABLET: 50; 8.6 TABLET ORAL at 17:23

## 2023-05-12 RX ADMIN — OXYCODONE 5 MG: 5 TABLET ORAL at 08:36

## 2023-05-12 RX ADMIN — LIDOCAINE 2 PATCH: 50 PATCH TOPICAL at 11:55

## 2023-05-12 RX ADMIN — OMEPRAZOLE 40 MG: 20 CAPSULE, DELAYED RELEASE ORAL at 05:01

## 2023-05-12 RX ADMIN — OXYCODONE 5 MG: 5 TABLET ORAL at 04:38

## 2023-05-12 RX ADMIN — Medication 5 MG: at 20:30

## 2023-05-12 ASSESSMENT — ENCOUNTER SYMPTOMS
ROS SKIN COMMENTS: BRUISING
MYALGIAS: 1
ABDOMINAL PAIN: 1
SHORTNESS OF BREATH: 1
EYES NEGATIVE: 1
WEAKNESS: 1
CHILLS: 0
NERVOUS/ANXIOUS: 1
COUGH: 1
FEVER: 0
CARDIOVASCULAR NEGATIVE: 1

## 2023-05-12 ASSESSMENT — FIBROSIS 4 INDEX
FIB4 SCORE: 9.32
FIB4 SCORE: 9.32

## 2023-05-12 ASSESSMENT — PAIN DESCRIPTION - PAIN TYPE
TYPE: ACUTE PAIN

## 2023-05-12 ASSESSMENT — LIFESTYLE VARIABLES: SUBSTANCE_ABUSE: 1

## 2023-05-12 NOTE — CARE PLAN
Monitor summary  ST 100s-110s              The patient is Stable - Low risk of patient condition declining or worsening    Shift Goals  Clinical Goals: Maintain patient safety  Patient Goals: Less pain  Family Goals: CELINA    Progress made toward(s) clinical / shift goals:        Problem: Knowledge Deficit - Standard  Goal: Patient and family/care givers will demonstrate understanding of plan of care, disease process/condition, diagnostic tests and medications  Outcome: Progressing     Problem: Respiratory  Goal: Patient will achieve/maintain optimum respiratory ventilation and gas exchange  Outcome: Progressing     Problem: Risk for Aspiration  Goal: Patient's risk for aspiration will be absent or decrease  Outcome: Progressing     Problem: Urinary Elimination  Goal: Establish and maintain regular urinary output  Outcome: Progressing     Problem: Pain - Standard  Goal: Alleviation of pain or a reduction in pain to the patient’s comfort goal  Outcome: Progressing       Patient is not progressing towards the following goals:
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
## 2023-05-12 NOTE — CARE PLAN
The patient is Stable - Low risk of patient condition declining or worsening    Shift Goals  Clinical Goals: wean O2 as tolerated  Patient Goals: rest/ sleep  Family Goals: CELINA    Progress made toward(s) clinical / shift goals:    Problem: Knowledge Deficit - Standard  Goal: Patient and family/care givers will demonstrate understanding of plan of care, disease process/condition, diagnostic tests and medications  Outcome: Progressing     Problem: Hemodynamics  Goal: Patient's hemodynamics, fluid balance and neurologic status will be stable or improve  Outcome: Progressing     Problem: Pain - Standard  Goal: Alleviation of pain or a reduction in pain to the patient’s comfort goal  Outcome: Progressing       Patient is not progressing towards the following goals:

## 2023-05-12 NOTE — DIETARY
Nutrition Services: Update   Day 4 of admit.  Cory Marmolejo is a 37 y.o. male with admitting DX of Cardiac arrest (HCC) [I46.9]    Pt on TF 5/8-5/11.   Pt started PO diet per SLP after FEES 5/11. NGT removed. No meals recorded in ADLs yet.  Weight up, consistent with +Fluid balance.  RD will monitor PO intake for adequacy over the next several days.  RD received prior consult to provide pt with Diabetes Diet Education. No HA1c on file. RD will order and proceed with diet education as indicated.    RD following.

## 2023-05-12 NOTE — PROGRESS NOTES
[facility] ICU Progress Note    Cory Marmolejo is a 37 y.o. male with PMH of TBI s/p craniotomy, seizure disorder on keppra, alcohol use disorder with liver disease, and T2DM who presented for admission on 5/7/2023 for out of hospital PEA arrest with reported ROSC after 45 mins; patient's DKA thought to be secondary to alcohol use before presentation. Patient initially ICU status for DKA treatment, pressor support and vent management. Patient DKA has resolved and has been tolerating a diet; patient intubated on 5/7 and extubated on 5/10, not requiring any oxygen therapy and neurologically intact. Patient weaned off pressor support and had FEES which demonstrated moderate oropharyngeal dysphagia; started on minced and moist diet with mildly thick liquids. Patient is safe for transfer to floor for continued treatment of LEIGH, hyponatremia, thrombocytopenia and GIB.

## 2023-05-13 PROBLEM — N17.0 ACUTE KIDNEY INJURY (AKI) WITH ACUTE TUBULAR NECROSIS (ATN) (HCC): Status: ACTIVE | Noted: 2023-05-08

## 2023-05-13 PROBLEM — R18.8 OTHER ASCITES: Status: ACTIVE | Noted: 2023-05-13

## 2023-05-13 LAB
ALBUMIN SERPL BCP-MCNC: 2.5 G/DL (ref 3.2–4.9)
ALBUMIN/GLOB SERPL: 0.9 G/DL
ALP SERPL-CCNC: 103 U/L (ref 30–99)
ALT SERPL-CCNC: 109 U/L (ref 2–50)
AMMONIA PLAS-SCNC: 17 UMOL/L (ref 11–45)
ANION GAP SERPL CALC-SCNC: 18 MMOL/L (ref 7–16)
ANISOCYTOSIS BLD QL SMEAR: ABNORMAL
AST SERPL-CCNC: 89 U/L (ref 12–45)
BACTERIA BLD CULT: NORMAL
BACTERIA BLD CULT: NORMAL
BASOPHILS # BLD AUTO: 0 % (ref 0–1.8)
BASOPHILS # BLD: 0 K/UL (ref 0–0.12)
BILIRUB SERPL-MCNC: 0.4 MG/DL (ref 0.1–1.5)
BUN SERPL-MCNC: 59 MG/DL (ref 8–22)
BURR CELLS BLD QL SMEAR: NORMAL
CALCIUM ALBUM COR SERPL-MCNC: 9 MG/DL (ref 8.5–10.5)
CALCIUM SERPL-MCNC: 7.8 MG/DL (ref 8.5–10.5)
CHLORIDE SERPL-SCNC: 96 MMOL/L (ref 96–112)
CO2 SERPL-SCNC: 19 MMOL/L (ref 20–33)
CREAT SERPL-MCNC: 3.08 MG/DL (ref 0.5–1.4)
EOSINOPHIL # BLD AUTO: 0 K/UL (ref 0–0.51)
EOSINOPHIL NFR BLD: 0 % (ref 0–6.9)
ERYTHROCYTE [DISTWIDTH] IN BLOOD BY AUTOMATED COUNT: 41.8 FL (ref 35.9–50)
EST. AVERAGE GLUCOSE BLD GHB EST-MCNC: 355 MG/DL
GFR SERPLBLD CREATININE-BSD FMLA CKD-EPI: 26 ML/MIN/1.73 M 2
GLOBULIN SER CALC-MCNC: 2.8 G/DL (ref 1.9–3.5)
GLUCOSE BLD STRIP.AUTO-MCNC: 105 MG/DL (ref 65–99)
GLUCOSE BLD STRIP.AUTO-MCNC: 107 MG/DL (ref 65–99)
GLUCOSE BLD STRIP.AUTO-MCNC: 110 MG/DL (ref 65–99)
GLUCOSE BLD STRIP.AUTO-MCNC: 124 MG/DL (ref 65–99)
GLUCOSE BLD STRIP.AUTO-MCNC: 86 MG/DL (ref 65–99)
GLUCOSE SERPL-MCNC: 118 MG/DL (ref 65–99)
HAV IGM SERPL QL IA: NORMAL
HBA1C MFR BLD: 14 % (ref 4–5.6)
HBV CORE IGM SER QL: NORMAL
HBV SURFACE AG SER QL: NORMAL
HCT VFR BLD AUTO: 24.7 % (ref 42–52)
HCV AB SER QL: NORMAL
HGB BLD-MCNC: 9 G/DL (ref 14–18)
LYMPHOCYTES # BLD AUTO: 0.29 K/UL (ref 1–4.8)
LYMPHOCYTES NFR BLD: 1.7 % (ref 22–41)
MACROCYTES BLD QL SMEAR: ABNORMAL
MAGNESIUM SERPL-MCNC: 1.9 MG/DL (ref 1.5–2.5)
MANUAL DIFF BLD: NORMAL
MCH RBC QN AUTO: 31.5 PG (ref 27–33)
MCHC RBC AUTO-ENTMCNC: 36.4 G/DL (ref 33.7–35.3)
MCV RBC AUTO: 86.4 FL (ref 81.4–97.8)
MONOCYTES # BLD AUTO: 1 K/UL (ref 0–0.85)
MONOCYTES NFR BLD AUTO: 5.9 % (ref 0–13.4)
MORPHOLOGY BLD-IMP: NORMAL
NEUTROPHILS # BLD AUTO: 15.62 K/UL (ref 1.82–7.42)
NEUTROPHILS NFR BLD: 92.4 % (ref 44–72)
NRBC # BLD AUTO: 0 K/UL
NRBC BLD-RTO: 0 /100 WBC
PHOSPHATE SERPL-MCNC: 4.3 MG/DL (ref 2.5–4.5)
PLATELET # BLD AUTO: 64 K/UL (ref 164–446)
PLATELET BLD QL SMEAR: NORMAL
PMV BLD AUTO: 12 FL (ref 9–12.9)
POIKILOCYTOSIS BLD QL SMEAR: NORMAL
POTASSIUM SERPL-SCNC: 3.1 MMOL/L (ref 3.6–5.5)
PROT SERPL-MCNC: 5.3 G/DL (ref 6–8.2)
RBC # BLD AUTO: 2.86 M/UL (ref 4.7–6.1)
RBC BLD AUTO: PRESENT
SCHISTOCYTES BLD QL SMEAR: NORMAL
SIGNIFICANT IND 70042: NORMAL
SIGNIFICANT IND 70042: NORMAL
SITE SITE: NORMAL
SITE SITE: NORMAL
SODIUM SERPL-SCNC: 133 MMOL/L (ref 135–145)
SOURCE SOURCE: NORMAL
SOURCE SOURCE: NORMAL
WBC # BLD AUTO: 16.9 K/UL (ref 4.8–10.8)

## 2023-05-13 PROCEDURE — 80074 ACUTE HEPATITIS PANEL: CPT

## 2023-05-13 PROCEDURE — 85007 BL SMEAR W/DIFF WBC COUNT: CPT

## 2023-05-13 PROCEDURE — 770020 HCHG ROOM/CARE - TELE (206)

## 2023-05-13 PROCEDURE — 83036 HEMOGLOBIN GLYCOSYLATED A1C: CPT

## 2023-05-13 PROCEDURE — 700105 HCHG RX REV CODE 258: Performed by: INTERNAL MEDICINE

## 2023-05-13 PROCEDURE — 85025 COMPLETE CBC W/AUTO DIFF WBC: CPT

## 2023-05-13 PROCEDURE — A9270 NON-COVERED ITEM OR SERVICE: HCPCS | Performed by: NURSE PRACTITIONER

## 2023-05-13 PROCEDURE — A9270 NON-COVERED ITEM OR SERVICE: HCPCS | Performed by: STUDENT IN AN ORGANIZED HEALTH CARE EDUCATION/TRAINING PROGRAM

## 2023-05-13 PROCEDURE — 700102 HCHG RX REV CODE 250 W/ 637 OVERRIDE(OP): Performed by: NURSE PRACTITIONER

## 2023-05-13 PROCEDURE — 700102 HCHG RX REV CODE 250 W/ 637 OVERRIDE(OP): Performed by: STUDENT IN AN ORGANIZED HEALTH CARE EDUCATION/TRAINING PROGRAM

## 2023-05-13 PROCEDURE — 99232 SBSQ HOSP IP/OBS MODERATE 35: CPT | Performed by: STUDENT IN AN ORGANIZED HEALTH CARE EDUCATION/TRAINING PROGRAM

## 2023-05-13 PROCEDURE — 700111 HCHG RX REV CODE 636 W/ 250 OVERRIDE (IP): Performed by: INTERNAL MEDICINE

## 2023-05-13 PROCEDURE — 84100 ASSAY OF PHOSPHORUS: CPT

## 2023-05-13 PROCEDURE — 82140 ASSAY OF AMMONIA: CPT

## 2023-05-13 PROCEDURE — A9270 NON-COVERED ITEM OR SERVICE: HCPCS | Performed by: INTERNAL MEDICINE

## 2023-05-13 PROCEDURE — 80053 COMPREHEN METABOLIC PANEL: CPT

## 2023-05-13 PROCEDURE — 83735 ASSAY OF MAGNESIUM: CPT

## 2023-05-13 PROCEDURE — 700102 HCHG RX REV CODE 250 W/ 637 OVERRIDE(OP): Performed by: INTERNAL MEDICINE

## 2023-05-13 PROCEDURE — 36415 COLL VENOUS BLD VENIPUNCTURE: CPT

## 2023-05-13 PROCEDURE — 700101 HCHG RX REV CODE 250: Performed by: HOSPITALIST

## 2023-05-13 PROCEDURE — 700111 HCHG RX REV CODE 636 W/ 250 OVERRIDE (IP): Performed by: STUDENT IN AN ORGANIZED HEALTH CARE EDUCATION/TRAINING PROGRAM

## 2023-05-13 PROCEDURE — 82962 GLUCOSE BLOOD TEST: CPT

## 2023-05-13 RX ORDER — MAGNESIUM SULFATE 1 G/100ML
1 INJECTION INTRAVENOUS ONCE
Status: COMPLETED | OUTPATIENT
Start: 2023-05-13 | End: 2023-05-13

## 2023-05-13 RX ORDER — BISACODYL 10 MG
10 SUPPOSITORY, RECTAL RECTAL
Status: DISCONTINUED | OUTPATIENT
Start: 2023-05-13 | End: 2023-05-14

## 2023-05-13 RX ORDER — POTASSIUM CHLORIDE 20 MEQ/1
40 TABLET, EXTENDED RELEASE ORAL EVERY 6 HOURS
Status: DISCONTINUED | OUTPATIENT
Start: 2023-05-14 | End: 2023-05-14

## 2023-05-13 RX ORDER — ENOXAPARIN SODIUM 100 MG/ML
30 INJECTION SUBCUTANEOUS DAILY
Status: DISCONTINUED | OUTPATIENT
Start: 2023-05-13 | End: 2023-05-13

## 2023-05-13 RX ORDER — INSULIN LISPRO 100 [IU]/ML
2-9 INJECTION, SOLUTION INTRAVENOUS; SUBCUTANEOUS EVERY 6 HOURS
Status: DISCONTINUED | OUTPATIENT
Start: 2023-05-14 | End: 2023-05-13

## 2023-05-13 RX ORDER — AMOXICILLIN 250 MG
2 CAPSULE ORAL 2 TIMES DAILY
Status: DISCONTINUED | OUTPATIENT
Start: 2023-05-14 | End: 2023-05-14

## 2023-05-13 RX ORDER — INSULIN LISPRO 100 [IU]/ML
2-9 INJECTION, SOLUTION INTRAVENOUS; SUBCUTANEOUS
Status: DISCONTINUED | OUTPATIENT
Start: 2023-05-14 | End: 2023-05-15

## 2023-05-13 RX ORDER — OXYCODONE HYDROCHLORIDE 5 MG/1
2.5-5 TABLET ORAL EVERY 4 HOURS PRN
Status: DISCONTINUED | OUTPATIENT
Start: 2023-05-13 | End: 2023-05-14

## 2023-05-13 RX ORDER — HEPARIN SODIUM 5000 [USP'U]/ML
5000 INJECTION, SOLUTION INTRAVENOUS; SUBCUTANEOUS EVERY 8 HOURS
Status: DISCONTINUED | OUTPATIENT
Start: 2023-05-13 | End: 2023-05-16 | Stop reason: HOSPADM

## 2023-05-13 RX ORDER — LEVETIRACETAM 500 MG/1
500 TABLET ORAL 2 TIMES DAILY
Status: DISCONTINUED | OUTPATIENT
Start: 2023-05-14 | End: 2023-05-14

## 2023-05-13 RX ORDER — POLYETHYLENE GLYCOL 3350 17 G/17G
1 POWDER, FOR SOLUTION ORAL
Status: DISCONTINUED | OUTPATIENT
Start: 2023-05-13 | End: 2023-05-14

## 2023-05-13 RX ORDER — LANOLIN ALCOHOL/MO/W.PET/CERES
400 CREAM (GRAM) TOPICAL DAILY
Status: DISCONTINUED | OUTPATIENT
Start: 2023-05-14 | End: 2023-05-14

## 2023-05-13 RX ORDER — CHOLECALCIFEROL (VITAMIN D3) 125 MCG
5 CAPSULE ORAL NIGHTLY PRN
Status: DISCONTINUED | OUTPATIENT
Start: 2023-05-13 | End: 2023-05-14

## 2023-05-13 RX ORDER — POTASSIUM CHLORIDE 20 MEQ/1
40 TABLET, EXTENDED RELEASE ORAL EVERY 6 HOURS
Status: DISCONTINUED | OUTPATIENT
Start: 2023-05-13 | End: 2023-05-13

## 2023-05-13 RX ORDER — LANOLIN ALCOHOL/MO/W.PET/CERES
400 CREAM (GRAM) TOPICAL DAILY
Status: DISCONTINUED | OUTPATIENT
Start: 2023-05-13 | End: 2023-05-13

## 2023-05-13 RX ORDER — OMEPRAZOLE 20 MG/1
20 CAPSULE, DELAYED RELEASE ORAL EVERY 12 HOURS
Status: DISCONTINUED | OUTPATIENT
Start: 2023-05-13 | End: 2023-05-14

## 2023-05-13 RX ADMIN — SENNOSIDES AND DOCUSATE SODIUM 2 TABLET: 50; 8.6 TABLET ORAL at 18:01

## 2023-05-13 RX ADMIN — AMPICILLIN SODIUM AND SULBACTAM SODIUM 3 G: 2; 1 INJECTION, POWDER, FOR SOLUTION INTRAMUSCULAR; INTRAVENOUS at 00:05

## 2023-05-13 RX ADMIN — HEPARIN SODIUM 5000 UNITS: 5000 INJECTION, SOLUTION INTRAVENOUS; SUBCUTANEOUS at 23:38

## 2023-05-13 RX ADMIN — OXYCODONE 5 MG: 5 TABLET ORAL at 05:49

## 2023-05-13 RX ADMIN — OXYCODONE 5 MG: 5 TABLET ORAL at 01:37

## 2023-05-13 RX ADMIN — AMPICILLIN SODIUM AND SULBACTAM SODIUM 3 G: 2; 1 INJECTION, POWDER, FOR SOLUTION INTRAMUSCULAR; INTRAVENOUS at 05:50

## 2023-05-13 RX ADMIN — POTASSIUM CHLORIDE 40 MEQ: 1500 TABLET, EXTENDED RELEASE ORAL at 18:01

## 2023-05-13 RX ADMIN — OMEPRAZOLE 20 MG: 20 CAPSULE, DELAYED RELEASE ORAL at 18:01

## 2023-05-13 RX ADMIN — Medication 400 MG: at 15:28

## 2023-05-13 RX ADMIN — MAGNESIUM SULFATE HEPTAHYDRATE 1 G: 1 INJECTION, SOLUTION INTRAVENOUS at 15:28

## 2023-05-13 RX ADMIN — Medication 5 MG: at 20:54

## 2023-05-13 RX ADMIN — POTASSIUM BICARBONATE 50 MEQ: 978 TABLET, EFFERVESCENT ORAL at 09:49

## 2023-05-13 RX ADMIN — LEVETIRACETAM 500 MG: 500 TABLET, FILM COATED ORAL at 18:01

## 2023-05-13 RX ADMIN — INSULIN GLARGINE-YFGN 11 UNITS: 100 INJECTION, SOLUTION SUBCUTANEOUS at 18:00

## 2023-05-13 RX ADMIN — OXYCODONE 5 MG: 5 TABLET ORAL at 15:28

## 2023-05-13 RX ADMIN — LEVETIRACETAM 500 MG: 500 TABLET, FILM COATED ORAL at 05:48

## 2023-05-13 RX ADMIN — OXYCODONE 5 MG: 5 TABLET ORAL at 20:54

## 2023-05-13 RX ADMIN — OMEPRAZOLE 40 MG: 20 CAPSULE, DELAYED RELEASE ORAL at 06:30

## 2023-05-13 RX ADMIN — LIDOCAINE 2 PATCH: 50 PATCH TOPICAL at 09:49

## 2023-05-13 RX ADMIN — POTASSIUM CHLORIDE 40 MEQ: 1500 TABLET, EXTENDED RELEASE ORAL at 23:38

## 2023-05-13 RX ADMIN — OXYCODONE 5 MG: 5 TABLET ORAL at 09:50

## 2023-05-13 ASSESSMENT — ENCOUNTER SYMPTOMS
SHORTNESS OF BREATH: 0
COUGH: 0
DIZZINESS: 0
HEADACHES: 0
FEVER: 0
VOMITING: 0
NAUSEA: 0
CHILLS: 0
MYALGIAS: 0
ABDOMINAL PAIN: 0
PALPITATIONS: 0

## 2023-05-13 ASSESSMENT — FIBROSIS 4 INDEX: FIB4 SCORE: 4.8

## 2023-05-13 ASSESSMENT — PAIN DESCRIPTION - PAIN TYPE
TYPE: ACUTE PAIN
TYPE: ACUTE PAIN

## 2023-05-13 NOTE — ASSESSMENT & PLAN NOTE
Unclear baseline, patient denies prior renal insufficiency  - Most likely ATN versus prerenal in etiology given recent arrest    Echogenic kidneys on ultrasound  Making urine  Avoid nephrotoxic agents, renally dose medications as appropriate

## 2023-05-13 NOTE — ASSESSMENT & PLAN NOTE
Reported hematemesis and dried blood around mouth and nares bilaterally. Also reported bloody stools  - History of alcoholic hepatitis per chart?,  No histories of varices however no EGD found in chart  Continue protective measures, PPI  Check abdominal ultrasound  Close monitoring

## 2023-05-13 NOTE — CARE PLAN
The patient is Watcher - Medium risk of patient condition declining or worsening    Shift Goals  Clinical Goals: Hemodynamisc and O2  Patient Goals: Pain management  Family Goals: CELINA    Progress made toward(s) clinical / shift goals:    Problem: Knowledge Deficit - Standard  Goal: Patient and family/care givers will demonstrate understanding of plan of care, disease process/condition, diagnostic tests and medications  Outcome: Progressing       Patient is not progressing towards the following goals:

## 2023-05-13 NOTE — ASSESSMENT & PLAN NOTE
Patient is hemoglobin A1c is over 14, indicating untreated diabetes  Possibly exacerbated secondary to alcohol use  It is post aggressive ICU treatment for DKA, insulin drips  Diabetic diet, diabetic education  Alcohol cessation  Maintain adequate hydration    Resolved  Patient continues to have poor oral intake.  I have decreased patient's glargine insulin to 9 units daily.    Change lispro to TID AC  Hypoglycemia protocol

## 2023-05-13 NOTE — ASSESSMENT & PLAN NOTE
Secondary to alcoholism,  Hepatitis panel negative in 2019, repeat  Liver normal on ultrasound  Avoid hepatotoxins.

## 2023-05-13 NOTE — ASSESSMENT & PLAN NOTE
Status postcardiac arrest, likely a component of aspiration  Successfully extubated    Possible aspiration pneumonia now on 1.5 LPM oxygen via nasal cannula.

## 2023-05-13 NOTE — ASSESSMENT & PLAN NOTE
Out of hospital,  Presumed secondary to DKA, chronic alcoholism  Echocardiogram overall benign  The patient is slowly recovering  Supportive care

## 2023-05-14 ENCOUNTER — APPOINTMENT (OUTPATIENT)
Dept: RADIOLOGY | Facility: MEDICAL CENTER | Age: 38
DRG: 208 | End: 2023-05-14
Attending: STUDENT IN AN ORGANIZED HEALTH CARE EDUCATION/TRAINING PROGRAM
Payer: MEDICAID

## 2023-05-14 PROBLEM — J69.0 ASPIRATION PNEUMONIA (HCC): Status: ACTIVE | Noted: 2023-05-14

## 2023-05-14 LAB
CRP SERPL HS-MCNC: 17.04 MG/DL (ref 0–0.75)
ERYTHROCYTE [SEDIMENTATION RATE] IN BLOOD BY WESTERGREN METHOD: 122 MM/HOUR (ref 0–20)
GLUCOSE BLD STRIP.AUTO-MCNC: 118 MG/DL (ref 65–99)
GLUCOSE BLD STRIP.AUTO-MCNC: 91 MG/DL (ref 65–99)
GLUCOSE BLD STRIP.AUTO-MCNC: 94 MG/DL (ref 65–99)
PROCALCITONIN SERPL-MCNC: 1.68 NG/ML

## 2023-05-14 PROCEDURE — 770001 HCHG ROOM/CARE - MED/SURG/GYN PRIV*

## 2023-05-14 PROCEDURE — 36415 COLL VENOUS BLD VENIPUNCTURE: CPT

## 2023-05-14 PROCEDURE — 700101 HCHG RX REV CODE 250: Performed by: HOSPITALIST

## 2023-05-14 PROCEDURE — A9270 NON-COVERED ITEM OR SERVICE: HCPCS | Performed by: STUDENT IN AN ORGANIZED HEALTH CARE EDUCATION/TRAINING PROGRAM

## 2023-05-14 PROCEDURE — 99232 SBSQ HOSP IP/OBS MODERATE 35: CPT | Performed by: STUDENT IN AN ORGANIZED HEALTH CARE EDUCATION/TRAINING PROGRAM

## 2023-05-14 PROCEDURE — 700105 HCHG RX REV CODE 258: Performed by: STUDENT IN AN ORGANIZED HEALTH CARE EDUCATION/TRAINING PROGRAM

## 2023-05-14 PROCEDURE — 86140 C-REACTIVE PROTEIN: CPT

## 2023-05-14 PROCEDURE — 700111 HCHG RX REV CODE 636 W/ 250 OVERRIDE (IP): Performed by: STUDENT IN AN ORGANIZED HEALTH CARE EDUCATION/TRAINING PROGRAM

## 2023-05-14 PROCEDURE — A9270 NON-COVERED ITEM OR SERVICE: HCPCS | Performed by: NURSE PRACTITIONER

## 2023-05-14 PROCEDURE — 700102 HCHG RX REV CODE 250 W/ 637 OVERRIDE(OP): Performed by: STUDENT IN AN ORGANIZED HEALTH CARE EDUCATION/TRAINING PROGRAM

## 2023-05-14 PROCEDURE — 700102 HCHG RX REV CODE 250 W/ 637 OVERRIDE(OP): Performed by: NURSE PRACTITIONER

## 2023-05-14 PROCEDURE — 71045 X-RAY EXAM CHEST 1 VIEW: CPT

## 2023-05-14 PROCEDURE — 84145 PROCALCITONIN (PCT): CPT

## 2023-05-14 PROCEDURE — 85652 RBC SED RATE AUTOMATED: CPT

## 2023-05-14 PROCEDURE — 82962 GLUCOSE BLOOD TEST: CPT

## 2023-05-14 PROCEDURE — 97602 WOUND(S) CARE NON-SELECTIVE: CPT

## 2023-05-14 PROCEDURE — 700101 HCHG RX REV CODE 250: Performed by: STUDENT IN AN ORGANIZED HEALTH CARE EDUCATION/TRAINING PROGRAM

## 2023-05-14 RX ORDER — POTASSIUM CHLORIDE 20 MEQ/1
40 TABLET, EXTENDED RELEASE ORAL EVERY 6 HOURS
Status: COMPLETED | OUTPATIENT
Start: 2023-05-14 | End: 2023-05-14

## 2023-05-14 RX ORDER — OMEPRAZOLE 20 MG/1
40 CAPSULE, DELAYED RELEASE ORAL 2 TIMES DAILY
Status: DISCONTINUED | OUTPATIENT
Start: 2023-05-15 | End: 2023-05-14

## 2023-05-14 RX ORDER — LANOLIN ALCOHOL/MO/W.PET/CERES
400 CREAM (GRAM) TOPICAL DAILY
Status: DISCONTINUED | OUTPATIENT
Start: 2023-05-15 | End: 2023-05-16 | Stop reason: HOSPADM

## 2023-05-14 RX ORDER — LANOLIN ALCOHOL/MO/W.PET/CERES
400 CREAM (GRAM) TOPICAL DAILY
Status: DISCONTINUED | OUTPATIENT
Start: 2023-05-15 | End: 2023-05-14

## 2023-05-14 RX ORDER — CHOLECALCIFEROL (VITAMIN D3) 125 MCG
5 CAPSULE ORAL NIGHTLY PRN
Status: DISCONTINUED | OUTPATIENT
Start: 2023-05-14 | End: 2023-05-16 | Stop reason: HOSPADM

## 2023-05-14 RX ORDER — BISACODYL 10 MG
10 SUPPOSITORY, RECTAL RECTAL
Status: DISCONTINUED | OUTPATIENT
Start: 2023-05-14 | End: 2023-05-14

## 2023-05-14 RX ORDER — BISACODYL 10 MG
10 SUPPOSITORY, RECTAL RECTAL
Status: DISCONTINUED | OUTPATIENT
Start: 2023-05-14 | End: 2023-05-16 | Stop reason: HOSPADM

## 2023-05-14 RX ORDER — POLYETHYLENE GLYCOL 3350 17 G/17G
1 POWDER, FOR SOLUTION ORAL
Status: DISCONTINUED | OUTPATIENT
Start: 2023-05-14 | End: 2023-05-16 | Stop reason: HOSPADM

## 2023-05-14 RX ORDER — POLYETHYLENE GLYCOL 3350 17 G/17G
1 POWDER, FOR SOLUTION ORAL
Status: DISCONTINUED | OUTPATIENT
Start: 2023-05-14 | End: 2023-05-14

## 2023-05-14 RX ORDER — AMOXICILLIN 250 MG
2 CAPSULE ORAL 2 TIMES DAILY
Status: DISCONTINUED | OUTPATIENT
Start: 2023-05-14 | End: 2023-05-14

## 2023-05-14 RX ORDER — CHOLECALCIFEROL (VITAMIN D3) 125 MCG
5 CAPSULE ORAL NIGHTLY PRN
Status: DISCONTINUED | OUTPATIENT
Start: 2023-05-14 | End: 2023-05-14

## 2023-05-14 RX ORDER — AMOXICILLIN 250 MG
2 CAPSULE ORAL 2 TIMES DAILY
Status: DISCONTINUED | OUTPATIENT
Start: 2023-05-15 | End: 2023-05-16 | Stop reason: HOSPADM

## 2023-05-14 RX ORDER — OXYCODONE HYDROCHLORIDE 5 MG/1
2.5-5 TABLET ORAL EVERY 4 HOURS PRN
Status: DISCONTINUED | OUTPATIENT
Start: 2023-05-14 | End: 2023-05-16 | Stop reason: HOSPADM

## 2023-05-14 RX ORDER — OXYCODONE HYDROCHLORIDE 5 MG/1
2.5-5 TABLET ORAL EVERY 4 HOURS PRN
Status: DISCONTINUED | OUTPATIENT
Start: 2023-05-14 | End: 2023-05-14

## 2023-05-14 RX ORDER — LEVETIRACETAM 500 MG/1
500 TABLET ORAL 2 TIMES DAILY
Status: DISCONTINUED | OUTPATIENT
Start: 2023-05-15 | End: 2023-05-16 | Stop reason: HOSPADM

## 2023-05-14 RX ORDER — OMEPRAZOLE 20 MG/1
20 CAPSULE, DELAYED RELEASE ORAL 2 TIMES DAILY
Status: DISCONTINUED | OUTPATIENT
Start: 2023-05-15 | End: 2023-05-16 | Stop reason: HOSPADM

## 2023-05-14 RX ORDER — LEVETIRACETAM 500 MG/1
500 TABLET ORAL 2 TIMES DAILY
Status: DISCONTINUED | OUTPATIENT
Start: 2023-05-14 | End: 2023-05-14

## 2023-05-14 RX ADMIN — HEPARIN SODIUM 5000 UNITS: 5000 INJECTION, SOLUTION INTRAVENOUS; SUBCUTANEOUS at 06:00

## 2023-05-14 RX ADMIN — OXYCODONE 5 MG: 5 TABLET ORAL at 13:48

## 2023-05-14 RX ADMIN — LEVETIRACETAM 500 MG: 500 TABLET, FILM COATED ORAL at 18:01

## 2023-05-14 RX ADMIN — POTASSIUM CHLORIDE 40 MEQ: 1500 TABLET, EXTENDED RELEASE ORAL at 05:59

## 2023-05-14 RX ADMIN — OXYCODONE 5 MG: 5 TABLET ORAL at 22:47

## 2023-05-14 RX ADMIN — MUPIROCIN 2 %: 20 OINTMENT TOPICAL at 18:00

## 2023-05-14 RX ADMIN — OMEPRAZOLE 20 MG: 20 CAPSULE, DELAYED RELEASE ORAL at 08:16

## 2023-05-14 RX ADMIN — HEPARIN SODIUM 5000 UNITS: 5000 INJECTION, SOLUTION INTRAVENOUS; SUBCUTANEOUS at 13:07

## 2023-05-14 RX ADMIN — LIDOCAINE 2 PATCH: 50 PATCH TOPICAL at 12:07

## 2023-05-14 RX ADMIN — OMEPRAZOLE 40 MG: 20 CAPSULE, DELAYED RELEASE ORAL at 18:11

## 2023-05-14 RX ADMIN — OXYCODONE 5 MG: 5 TABLET ORAL at 09:49

## 2023-05-14 RX ADMIN — HEPARIN SODIUM 5000 UNITS: 5000 INJECTION, SOLUTION INTRAVENOUS; SUBCUTANEOUS at 21:16

## 2023-05-14 RX ADMIN — LEVETIRACETAM 500 MG: 500 TABLET, FILM COATED ORAL at 06:00

## 2023-05-14 RX ADMIN — OXYCODONE 5 MG: 5 TABLET ORAL at 01:13

## 2023-05-14 RX ADMIN — Medication 400 MG: at 06:00

## 2023-05-14 RX ADMIN — OXYCODONE 5 MG: 5 TABLET ORAL at 05:56

## 2023-05-14 RX ADMIN — OXYCODONE 5 MG: 5 TABLET ORAL at 18:10

## 2023-05-14 RX ADMIN — POTASSIUM CHLORIDE 40 MEQ: 1500 TABLET, EXTENDED RELEASE ORAL at 12:06

## 2023-05-14 RX ADMIN — AMPICILLIN SODIUM AND SULBACTAM SODIUM 3 G: 2; 1 INJECTION, POWDER, FOR SOLUTION INTRAMUSCULAR; INTRAVENOUS at 16:15

## 2023-05-14 ASSESSMENT — COGNITIVE AND FUNCTIONAL STATUS - GENERAL
MOVING TO AND FROM BED TO CHAIR: A LOT
DRESSING REGULAR UPPER BODY CLOTHING: A LITTLE
DRESSING REGULAR LOWER BODY CLOTHING: A LOT
SUGGESTED CMS G CODE MODIFIER DAILY ACTIVITY: CK
DAILY ACTIVITIY SCORE: 17
TOILETING: A LITTLE
PERSONAL GROOMING: A LITTLE
WALKING IN HOSPITAL ROOM: A LOT
HELP NEEDED FOR BATHING: A LOT
TURNING FROM BACK TO SIDE WHILE IN FLAT BAD: A LITTLE
MOVING FROM LYING ON BACK TO SITTING ON SIDE OF FLAT BED: A LOT
MOBILITY SCORE: 13
CLIMB 3 TO 5 STEPS WITH RAILING: A LOT
SUGGESTED CMS G CODE MODIFIER MOBILITY: CL
STANDING UP FROM CHAIR USING ARMS: A LOT

## 2023-05-14 ASSESSMENT — PATIENT HEALTH QUESTIONNAIRE - PHQ9
2. FEELING DOWN, DEPRESSED, IRRITABLE, OR HOPELESS: NOT AT ALL
6. FEELING BAD ABOUT YOURSELF - OR THAT YOU ARE A FAILURE OR HAVE LET YOURSELF OR YOUR FAMILY DOWN: NOT AL ALL
8. MOVING OR SPEAKING SO SLOWLY THAT OTHER PEOPLE COULD HAVE NOTICED. OR THE OPPOSITE, BEING SO FIGETY OR RESTLESS THAT YOU HAVE BEEN MOVING AROUND A LOT MORE THAN USUAL: NOT AT ALL
SUM OF ALL RESPONSES TO PHQ QUESTIONS 1-9: 0
4. FEELING TIRED OR HAVING LITTLE ENERGY: NOT AT ALL
5. POOR APPETITE OR OVEREATING: NOT AT ALL
3. TROUBLE FALLING OR STAYING ASLEEP OR SLEEPING TOO MUCH: NOT AT ALL
9. THOUGHTS THAT YOU WOULD BE BETTER OFF DEAD, OR OF HURTING YOURSELF: NOT AT ALL
7. TROUBLE CONCENTRATING ON THINGS, SUCH AS READING THE NEWSPAPER OR WATCHING TELEVISION: NOT AT ALL
SUM OF ALL RESPONSES TO PHQ9 QUESTIONS 1 AND 2: 0
1. LITTLE INTEREST OR PLEASURE IN DOING THINGS: NOT AT ALL

## 2023-05-14 ASSESSMENT — PAIN DESCRIPTION - PAIN TYPE
TYPE: ACUTE PAIN

## 2023-05-14 NOTE — ASSESSMENT & PLAN NOTE
5/14: Patient was increasing sputum production and cough overnight after pulling out tube feeds.  I have ordered a chest x-ray and restarted Unasyn 5-day course.  White count is increasing.  I have ordered a procalcitonin level, ESR, CRP.  5/15: Procalcitonin is mildly elevated.  Transitioning to Augmentin.  White count is decreasing.  Lungs sound clear.

## 2023-05-14 NOTE — PROGRESS NOTES
Patient refusing bed alarm. Fall precautions and safety discussed with patient. Patient verbalizes understanding.
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
## 2023-05-14 NOTE — PROGRESS NOTES
Report received from day shift RN, assumed care of pt. Pt A&Ox4. Plan of care discussed with pt, labs and chart reviewed. All needs met at this time. Tele box on. On RA. Call light within reach, bed locked and in lowest position. All fall precautions and hourly rounding in place.

## 2023-05-14 NOTE — CARE PLAN
The patient is Stable - Low risk of patient condition declining or worsening    Shift Goals  Clinical Goals: Stable VSS, pain control  Patient Goals: Rest, pain management  Family Goals: CELINA    Progress made toward(s) clinical / shift goals:        Problem: Knowledge Deficit - Standard  Goal: Patient and family/care givers will demonstrate understanding of plan of care, disease process/condition, diagnostic tests and medications  Outcome: Progressing, patient demonstrates understanding of care plan. Reinforcement needed occasionally.     Problem: Hemodynamics  Goal: Patient's hemodynamics, fluid balance and neurologic status will be stable or improve  Outcome: Progressing, hemodynamics, fluid balance, and neuro status remained stable throughout shift.     Problem: Pain - Standard  Goal: Alleviation of pain or a reduction in pain to the patient’s comfort goal  Outcome: Progressing, patient notes reduction in pain with medication and rest.      Problem: Skin Integrity  Goal: Skin integrity is maintained or improved  Outcome: Progressing, skin integrity maintained. Wound consult placed for skin tear on scrotum.        Patient is not progressing towards the following goals:
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
## 2023-05-14 NOTE — DIETARY
"Nutrition services: Day 6 of admit.  Cory Marmolejo is a 37 y.o. male with admitting DX of cardiac arrest.    Consult received for failure to thrive, diet education (DM). Met with pt at bedside. Pt was sitting up in bed, he appeared adequately nourished. Pt reports a fair appetite, he stated dislike of current diet texture (particularly thickened liquids), and stated that the food is dry. Discussed adding additional sauce and/or gravy with meals. Pt agreeable. Pt stated he was diagnosed with diabetes last year, stated difficulty regulating blood sugar, and that he has poor blood sugar control related to alcohol intake. Provided consistent CHO diet education and booklet to pt at bedside. He verbalized understanding, has a good base knowledge of diet modifications. Pt eager to have diet texture advanced, asking for thin liquids. SLP is following. Boost Glucose Control to be added to bolster nutrition. RD will add supplement order and adjust menu accordingly.    Assessment:  Height: 177.8 cm (5' 10\")  Weight: 60 kg (132 lb 4.4 oz)  Weight to Use in Calculations: 56.8 kg (125 lb 3.5 oz)  Body mass index is 18.98 kg/m²., BMI classification: normal  Diet/Intake: L5/L2 (minced and moist meals with thickened liquids), 1:1 supervision    Evaluation:   PMH: alcohol dependence, chronic liver disease, Type 2 DM.    Malnutrition Risk: No new risk identified.    Recommendations/Plan:  Diet texture per SLP.  Consistent CHO modification as needed.  Encourage intake of meals.  Document intake of all meals as % taken in ADLs to provide interdisciplinary communication across all shifts.   Monitor weight.  Nutrition rep will continue to see patient for ongoing meal and snack preferences.     RD will follow.      "
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
## 2023-05-15 PROBLEM — I46.9 CARDIAC ARREST (HCC): Status: RESOLVED | Noted: 2023-05-08 | Resolved: 2023-05-15

## 2023-05-15 PROBLEM — K92.0 HEMATEMESIS: Status: RESOLVED | Noted: 2023-05-08 | Resolved: 2023-05-15

## 2023-05-15 PROBLEM — S06.9XAA TBI (TRAUMATIC BRAIN INJURY) (HCC): Status: RESOLVED | Noted: 2023-05-08 | Resolved: 2023-05-15

## 2023-05-15 PROBLEM — E87.29 HIGH ANION GAP METABOLIC ACIDOSIS: Status: RESOLVED | Noted: 2023-05-08 | Resolved: 2023-05-15

## 2023-05-15 PROBLEM — E87.20 LACTIC ACIDOSIS: Status: RESOLVED | Noted: 2023-05-08 | Resolved: 2023-05-15

## 2023-05-15 PROBLEM — R57.9 SHOCK (HCC): Status: RESOLVED | Noted: 2023-05-08 | Resolved: 2023-05-15

## 2023-05-15 PROBLEM — F17.220 CHEWING TOBACCO NICOTINE DEPENDENCE WITHOUT COMPLICATION: Status: ACTIVE | Noted: 2023-05-15

## 2023-05-15 PROBLEM — R56.9 SEIZURE (HCC): Status: RESOLVED | Noted: 2023-05-08 | Resolved: 2023-05-15

## 2023-05-15 LAB
ALBUMIN SERPL BCP-MCNC: 2.6 G/DL (ref 3.2–4.9)
ALBUMIN/GLOB SERPL: 0.9 G/DL
ALP SERPL-CCNC: 85 U/L (ref 30–99)
ALT SERPL-CCNC: 65 U/L (ref 2–50)
ANION GAP SERPL CALC-SCNC: 18 MMOL/L (ref 7–16)
AST SERPL-CCNC: 33 U/L (ref 12–45)
BASOPHILS # BLD AUTO: 0.9 % (ref 0–1.8)
BASOPHILS # BLD: 0.09 K/UL (ref 0–0.12)
BILIRUB SERPL-MCNC: 0.4 MG/DL (ref 0.1–1.5)
BUN SERPL-MCNC: 33 MG/DL (ref 8–22)
CALCIUM ALBUM COR SERPL-MCNC: 9.1 MG/DL (ref 8.5–10.5)
CALCIUM SERPL-MCNC: 8 MG/DL (ref 8.5–10.5)
CHLORIDE SERPL-SCNC: 96 MMOL/L (ref 96–112)
CO2 SERPL-SCNC: 19 MMOL/L (ref 20–33)
CREAT SERPL-MCNC: 1.84 MG/DL (ref 0.5–1.4)
EOSINOPHIL # BLD AUTO: 0 K/UL (ref 0–0.51)
EOSINOPHIL NFR BLD: 0 % (ref 0–6.9)
ERYTHROCYTE [DISTWIDTH] IN BLOOD BY AUTOMATED COUNT: 45.9 FL (ref 35.9–50)
GFR SERPLBLD CREATININE-BSD FMLA CKD-EPI: 48 ML/MIN/1.73 M 2
GLOBULIN SER CALC-MCNC: 2.8 G/DL (ref 1.9–3.5)
GLUCOSE BLD STRIP.AUTO-MCNC: 101 MG/DL (ref 65–99)
GLUCOSE BLD STRIP.AUTO-MCNC: 122 MG/DL (ref 65–99)
GLUCOSE BLD STRIP.AUTO-MCNC: 299 MG/DL (ref 65–99)
GLUCOSE BLD STRIP.AUTO-MCNC: 57 MG/DL (ref 65–99)
GLUCOSE BLD STRIP.AUTO-MCNC: 66 MG/DL (ref 65–99)
GLUCOSE BLD STRIP.AUTO-MCNC: 67 MG/DL (ref 65–99)
GLUCOSE BLD STRIP.AUTO-MCNC: 67 MG/DL (ref 65–99)
GLUCOSE SERPL-MCNC: 69 MG/DL (ref 65–99)
HCT VFR BLD AUTO: 25.3 % (ref 42–52)
HGB BLD-MCNC: 8.8 G/DL (ref 14–18)
HYPOCHROMIA BLD QL SMEAR: ABNORMAL
LYMPHOCYTES # BLD AUTO: 0.36 K/UL (ref 1–4.8)
LYMPHOCYTES NFR BLD: 3.6 % (ref 22–41)
MANUAL DIFF BLD: NORMAL
MCH RBC QN AUTO: 31 PG (ref 27–33)
MCHC RBC AUTO-ENTMCNC: 34.8 G/DL (ref 33.7–35.3)
MCV RBC AUTO: 89.1 FL (ref 81.4–97.8)
MONOCYTES # BLD AUTO: 0.18 K/UL (ref 0–0.85)
MONOCYTES NFR BLD AUTO: 1.8 % (ref 0–13.4)
MORPHOLOGY BLD-IMP: NORMAL
NEUTROPHILS # BLD AUTO: 9.46 K/UL (ref 1.82–7.42)
NEUTROPHILS NFR BLD: 93.7 % (ref 44–72)
NRBC # BLD AUTO: 0 K/UL
NRBC BLD-RTO: 0 /100 WBC
PLATELET # BLD AUTO: 133 K/UL (ref 164–446)
PLATELET BLD QL SMEAR: NORMAL
PMV BLD AUTO: 11.2 FL (ref 9–12.9)
POTASSIUM SERPL-SCNC: 3.4 MMOL/L (ref 3.6–5.5)
PROT SERPL-MCNC: 5.4 G/DL (ref 6–8.2)
RBC # BLD AUTO: 2.84 M/UL (ref 4.7–6.1)
RBC BLD AUTO: PRESENT
SODIUM SERPL-SCNC: 133 MMOL/L (ref 135–145)
WBC # BLD AUTO: 10.1 K/UL (ref 4.8–10.8)

## 2023-05-15 PROCEDURE — 82962 GLUCOSE BLOOD TEST: CPT | Mod: 91

## 2023-05-15 PROCEDURE — 700105 HCHG RX REV CODE 258: Performed by: STUDENT IN AN ORGANIZED HEALTH CARE EDUCATION/TRAINING PROGRAM

## 2023-05-15 PROCEDURE — 36415 COLL VENOUS BLD VENIPUNCTURE: CPT

## 2023-05-15 PROCEDURE — 85007 BL SMEAR W/DIFF WBC COUNT: CPT

## 2023-05-15 PROCEDURE — 85025 COMPLETE CBC W/AUTO DIFF WBC: CPT

## 2023-05-15 PROCEDURE — 92526 ORAL FUNCTION THERAPY: CPT

## 2023-05-15 PROCEDURE — 80053 COMPREHEN METABOLIC PANEL: CPT

## 2023-05-15 PROCEDURE — 700111 HCHG RX REV CODE 636 W/ 250 OVERRIDE (IP): Performed by: STUDENT IN AN ORGANIZED HEALTH CARE EDUCATION/TRAINING PROGRAM

## 2023-05-15 PROCEDURE — 700101 HCHG RX REV CODE 250: Performed by: HOSPITALIST

## 2023-05-15 PROCEDURE — 700102 HCHG RX REV CODE 250 W/ 637 OVERRIDE(OP): Performed by: STUDENT IN AN ORGANIZED HEALTH CARE EDUCATION/TRAINING PROGRAM

## 2023-05-15 PROCEDURE — 770001 HCHG ROOM/CARE - MED/SURG/GYN PRIV*

## 2023-05-15 PROCEDURE — 99232 SBSQ HOSP IP/OBS MODERATE 35: CPT | Performed by: STUDENT IN AN ORGANIZED HEALTH CARE EDUCATION/TRAINING PROGRAM

## 2023-05-15 PROCEDURE — 700101 HCHG RX REV CODE 250: Performed by: STUDENT IN AN ORGANIZED HEALTH CARE EDUCATION/TRAINING PROGRAM

## 2023-05-15 PROCEDURE — A9270 NON-COVERED ITEM OR SERVICE: HCPCS | Performed by: STUDENT IN AN ORGANIZED HEALTH CARE EDUCATION/TRAINING PROGRAM

## 2023-05-15 RX ORDER — OMEPRAZOLE 20 MG/1
20 CAPSULE, DELAYED RELEASE ORAL 2 TIMES DAILY
Qty: 30 CAPSULE | Status: SHIPPED
Start: 2023-05-15 | End: 2023-05-16 | Stop reason: SDUPTHER

## 2023-05-15 RX ORDER — INSULIN GLARGINE 100 [IU]/ML
7 INJECTION, SOLUTION SUBCUTANEOUS EVERY EVENING
Status: ACTIVE | DISCHARGE
Start: 2023-05-15 | End: 2023-05-16 | Stop reason: SDUPTHER

## 2023-05-15 RX ORDER — INSULIN LISPRO 100 [IU]/ML
2-9 INJECTION, SOLUTION INTRAVENOUS; SUBCUTANEOUS
Status: DISCONTINUED | OUTPATIENT
Start: 2023-05-16 | End: 2023-05-16 | Stop reason: HOSPADM

## 2023-05-15 RX ORDER — AMOXICILLIN AND CLAVULANATE POTASSIUM 875; 125 MG/1; MG/1
1 TABLET, FILM COATED ORAL EVERY 12 HOURS
Status: DISCONTINUED | OUTPATIENT
Start: 2023-05-15 | End: 2023-05-16 | Stop reason: HOSPADM

## 2023-05-15 RX ORDER — POTASSIUM CHLORIDE 20 MEQ/1
40 TABLET, EXTENDED RELEASE ORAL EVERY 6 HOURS
Status: COMPLETED | OUTPATIENT
Start: 2023-05-15 | End: 2023-05-15

## 2023-05-15 RX ORDER — NICOTINE 21 MG/24HR
21 PATCH, TRANSDERMAL 24 HOURS TRANSDERMAL
Status: DISCONTINUED | OUTPATIENT
Start: 2023-05-15 | End: 2023-05-16 | Stop reason: HOSPADM

## 2023-05-15 RX ORDER — CHOLECALCIFEROL (VITAMIN D3) 125 MCG
5 CAPSULE ORAL NIGHTLY PRN
Qty: 30 TABLET | Status: SHIPPED
Start: 2023-05-15 | End: 2023-05-16

## 2023-05-15 RX ORDER — SCOLOPAMINE TRANSDERMAL SYSTEM 1 MG/1
1 PATCH, EXTENDED RELEASE TRANSDERMAL
Status: DISCONTINUED | OUTPATIENT
Start: 2023-05-15 | End: 2023-05-16 | Stop reason: HOSPADM

## 2023-05-15 RX ORDER — NICOTINE 21 MG/24HR
1 PATCH, TRANSDERMAL 24 HOURS TRANSDERMAL EVERY 24 HOURS
Qty: 30 PATCH | Status: SHIPPED
Start: 2023-05-15 | End: 2023-05-16 | Stop reason: SDUPTHER

## 2023-05-15 RX ORDER — AMOXICILLIN AND CLAVULANATE POTASSIUM 875; 125 MG/1; MG/1
1 TABLET, FILM COATED ORAL 2 TIMES DAILY
Qty: 8 TABLET | Refills: 0 | Status: ACTIVE | DISCHARGE
Start: 2023-05-15 | End: 2023-05-16

## 2023-05-15 RX ORDER — POTASSIUM CHLORIDE 20 MEQ/1
40 TABLET, EXTENDED RELEASE ORAL DAILY
Qty: 60 TABLET | Refills: 11 | Status: SHIPPED
Start: 2023-05-15

## 2023-05-15 RX ORDER — INSULIN LISPRO 100 [IU]/ML
2-9 INJECTION, SOLUTION INTRAVENOUS; SUBCUTANEOUS
Status: ACTIVE | DISCHARGE
Start: 2023-05-15 | End: 2023-05-16 | Stop reason: SDUPTHER

## 2023-05-15 RX ORDER — LIDOCAINE 50 MG/G
2 PATCH TOPICAL EVERY 24 HOURS
Qty: 10 PATCH | Status: SHIPPED
Start: 2023-05-15 | End: 2023-05-16 | Stop reason: SDUPTHER

## 2023-05-15 RX ORDER — LANOLIN ALCOHOL/MO/W.PET/CERES
400 CREAM (GRAM) TOPICAL DAILY
Qty: 30 TABLET | Status: SHIPPED
Start: 2023-05-16 | End: 2023-05-16

## 2023-05-15 RX ADMIN — SCOPOLAMINE 1 PATCH: 1.5 PATCH, EXTENDED RELEASE TRANSDERMAL at 23:14

## 2023-05-15 RX ADMIN — NICOTINE TRANSDERMAL SYSTEM 21 MG: 21 PATCH, EXTENDED RELEASE TRANSDERMAL at 12:30

## 2023-05-15 RX ADMIN — MUPIROCIN 1 APPLICATION: 20 OINTMENT TOPICAL at 05:36

## 2023-05-15 RX ADMIN — MUPIROCIN 2 %: 20 OINTMENT TOPICAL at 18:00

## 2023-05-15 RX ADMIN — HEPARIN SODIUM 5000 UNITS: 5000 INJECTION, SOLUTION INTRAVENOUS; SUBCUTANEOUS at 05:36

## 2023-05-15 RX ADMIN — OXYCODONE 5 MG: 5 TABLET ORAL at 09:36

## 2023-05-15 RX ADMIN — SENNOSIDES AND DOCUSATE SODIUM 2 TABLET: 50; 8.6 TABLET ORAL at 05:35

## 2023-05-15 RX ADMIN — OMEPRAZOLE 20 MG: 20 CAPSULE, DELAYED RELEASE ORAL at 17:35

## 2023-05-15 RX ADMIN — Medication 5 MG: at 01:49

## 2023-05-15 RX ADMIN — AMPICILLIN SODIUM AND SULBACTAM SODIUM 3 G: 2; 1 INJECTION, POWDER, FOR SOLUTION INTRAMUSCULAR; INTRAVENOUS at 03:12

## 2023-05-15 RX ADMIN — LIDOCAINE 2 PATCH: 50 PATCH TOPICAL at 12:08

## 2023-05-15 RX ADMIN — OXYCODONE 5 MG: 5 TABLET ORAL at 21:52

## 2023-05-15 RX ADMIN — HEPARIN SODIUM 5000 UNITS: 5000 INJECTION, SOLUTION INTRAVENOUS; SUBCUTANEOUS at 21:52

## 2023-05-15 RX ADMIN — DEXTROSE MONOHYDRATE 25 G: 100 INJECTION, SOLUTION INTRAVENOUS at 12:43

## 2023-05-15 RX ADMIN — OXYCODONE 5 MG: 5 TABLET ORAL at 03:06

## 2023-05-15 RX ADMIN — OMEPRAZOLE 20 MG: 20 CAPSULE, DELAYED RELEASE ORAL at 05:36

## 2023-05-15 RX ADMIN — POTASSIUM CHLORIDE 40 MEQ: 1500 TABLET, EXTENDED RELEASE ORAL at 13:40

## 2023-05-15 RX ADMIN — HEPARIN SODIUM 5000 UNITS: 5000 INJECTION, SOLUTION INTRAVENOUS; SUBCUTANEOUS at 13:40

## 2023-05-15 RX ADMIN — Medication 400 MG: at 05:36

## 2023-05-15 RX ADMIN — LEVETIRACETAM 500 MG: 500 TABLET, FILM COATED ORAL at 17:36

## 2023-05-15 RX ADMIN — AMOXICILLIN AND CLAVULANATE POTASSIUM 1 TABLET: 875; 125 TABLET, FILM COATED ORAL at 12:08

## 2023-05-15 RX ADMIN — LEVETIRACETAM 500 MG: 500 TABLET, FILM COATED ORAL at 05:35

## 2023-05-15 RX ADMIN — POTASSIUM CHLORIDE 40 MEQ: 1500 TABLET, EXTENDED RELEASE ORAL at 08:23

## 2023-05-15 RX ADMIN — SENNOSIDES AND DOCUSATE SODIUM 2 TABLET: 50; 8.6 TABLET ORAL at 17:35

## 2023-05-15 RX ADMIN — OXYCODONE 5 MG: 5 TABLET ORAL at 17:36

## 2023-05-15 RX ADMIN — OXYCODONE 5 MG: 5 TABLET ORAL at 13:40

## 2023-05-15 RX ADMIN — AMOXICILLIN AND CLAVULANATE POTASSIUM 1 TABLET: 875; 125 TABLET, FILM COATED ORAL at 17:35

## 2023-05-15 ASSESSMENT — FIBROSIS 4 INDEX: FIB4 SCORE: 1.11

## 2023-05-15 ASSESSMENT — PAIN DESCRIPTION - PAIN TYPE
TYPE: ACUTE PAIN

## 2023-05-15 ASSESSMENT — ENCOUNTER SYMPTOMS
SHORTNESS OF BREATH: 0
HEADACHES: 0
SPUTUM PRODUCTION: 1
VOMITING: 0
FEVER: 0
MYALGIAS: 0
ABDOMINAL PAIN: 0
NAUSEA: 0
DIZZINESS: 0
CHILLS: 0
PALPITATIONS: 0
WHEEZING: 0
COUGH: 1

## 2023-05-15 NOTE — CARE PLAN
The patient is Stable - Low risk of patient condition declining or worsening    Shift Goals  Clinical Goals: Pain control, Monitor O2  Patient Goals: Pain control, rest  Family Goals: CELINA    Progress made toward(s) clinical / shift goals:  Pain control interventions in place, patients respiratory status stable      Problem: Knowledge Deficit - Standard  Goal: Patient and family/care givers will demonstrate understanding of plan of care, disease process/condition, diagnostic tests and medications  Outcome: Progressing, patient demonstrates understanding of care plan.     Problem: Psychosocial  Goal: Patient's level of anxiety will decrease  Outcome: Progressing, patient notes reduction in anxiety     Problem: Respiratory  Goal: Patient will achieve/maintain optimum respiratory ventilation and gas exchange  Outcome: Progressing, patient able to maintain optimum respiratory ventilation/gas exchange. Mild dyspnea with exertion.     Problem: Skin Integrity  Goal: Skin integrity is maintained or improved  Outcome: Progressing, skin integrity maintained. Patient turns self from side to side. Pillows in place for support.        Patient is not progressing towards the following goals:

## 2023-05-15 NOTE — CARE PLAN
The patient is Stable - Low risk of patient condition declining or worsening    Shift Goals  Clinical Goals: Pain control, Monitor O2  Patient Goals: Pain control, rest  Family Goals: CELINA    Progress made toward(s) clinical / shift goals:      Patient is not progressing towards the following goals:      Problem: Knowledge Deficit - Standard  Goal: Patient and family/care givers will demonstrate understanding of plan of care, disease process/condition, diagnostic tests and medications  Outcome: Progressing     Problem: Psychosocial  Goal: Patient's level of anxiety will decrease  Outcome: Progressing  Goal: Patient's ability to verbalize feelings about condition will improve  Outcome: Progressing  Goal: Patient's ability to re-evaluate and adapt role responsibilities will improve  Outcome: Progressing  Goal: Patient and family will demonstrate ability to cope with life altering diagnosis and/or procedure  Outcome: Progressing  Goal: Spiritual and cultural needs incorporated into hospitalization  Outcome: Progressing     Problem: Hemodynamics  Goal: Patient's hemodynamics, fluid balance and neurologic status will be stable or improve  Outcome: Progressing     Problem: Respiratory  Goal: Patient will achieve/maintain optimum respiratory ventilation and gas exchange  Outcome: Progressing     Problem: Mechanical Ventilation  Goal: Safe management of artificial airway and ventilation  Outcome: Progressing  Goal: Successful weaning off mechanical ventilator, spontaneously maintains adequate gas exchange  Outcome: Progressing  Goal: Patient will be able to express needs and understand communication  Outcome: Progressing     Problem: Risk for Aspiration  Goal: Patient's risk for aspiration will be absent or decrease  Outcome: Progressing     Problem: Urinary - Renal Perfusion  Goal: Ability to achieve and maintain adequate renal perfusion and functioning will improve  Outcome: Progressing     Problem: Venous Thromboembolism  (VTE) Prevention  Goal: The patient will remain free from venous thromboembolism (VTE)  Outcome: Progressing     Problem: Nutrition  Goal: Patient's nutritional and fluid intake will be adequate or improve  Outcome: Progressing  Goal: Enteral nutrition will be maintained or improve  Outcome: Progressing  Goal: Enteral nutrition will be maintained or improve  Outcome: Progressing     Problem: Urinary Elimination  Goal: Establish and maintain regular urinary output  Outcome: Progressing     Problem: Bowel Elimination  Goal: Establish and maintain regular bowel function  Outcome: Progressing     Problem: Safety - Medical Restraint  Goal: Remains free of injury from restraints (Restraint for Interference with Medical Device)  Outcome: Progressing  Goal: Free from restraint(s) (Restraint for Interference with Medical Device)  Outcome: Progressing     Problem: Pain - Standard  Goal: Alleviation of pain or a reduction in pain to the patient’s comfort goal  Outcome: Progressing     Problem: Skin Integrity  Goal: Skin integrity is maintained or improved  Outcome: Progressing     Problem: Fall Risk  Goal: Patient will remain free from falls  Outcome: Progressing

## 2023-05-15 NOTE — DISCHARGE PLANNING
Case Management Discharge Planning    Admission Date: 5/7/2023  GMLOS: 8.1  ALOS: 7    6-Clicks ADL Score: 17  6-Clicks Mobility Score: 13  PT and/or OT Eval ordered: Yes  Post-acute Referrals Ordered: Yes  Post-acute Choice Obtained: Yes  Has referral(s) been sent to post-acute provider:  No      Anticipated Discharge Dispo: Discharge Disposition: Still a Patient (30)    DME Needed: No    Action(s) Taken: OTHER  Provided patient with a SNF list and explained referral process.  Referrals sent to [facility] as they have open beds and accept Medicaid FFS.    Escalations Completed: None    Medically Clear: No    Next Steps: Follow up with patient to obtain konrad    Barriers to Discharge: Medical clearance and Pending Placement    DC is dependent upon patient making choice and SNF acceptance.    Roseanna FREDERICK RN Case Manager  [phone]

## 2023-05-15 NOTE — PROGRESS NOTES
Handoff report received from day shift nurse. Pt care assumed. Pt is currently resting in bed. POC discussed with Pt and Pt verbalizes no questions at this time. Pt is AAOx4, on Ra. Patient made medical and removed from tele monitoring. Call light and belongings within reach, bed in lowest and locked position, and Pt educated on use of call light. Patient refusing bed alarm.

## 2023-05-15 NOTE — DISCHARGE PLANNING
1538  Agency/Facility Name: [facility]   Spoke To: Shon   Outcome: DPA called to verify bed availability, Per Shon no Medicaid beds open as of now.

## 2023-05-15 NOTE — THERAPY
Speech Language Pathology   Daily Treatment     Patient Name: Cory Marmolejo  AGE:  37 y.o., SEX:  male  Medical Record #: [id number]  Date of Service: 5/15/2023      Precautions:  Precautions: Fall Risk, Swallow Precautions     HPI: 37 y.o. man admitted on 5/7/23 following seizure, found to be pulseless and unresponsive s/p CPR for 45 minutes. CMH notable for cardiac arrest, chronic liver failure, seizure, DKA with coma, LEIGH, TBI, shock, rhabdo. PMH notable for DM2, seizure disorder, substance use. VEEG 5/8. Bronch 5/8. Intubated 5/7-5/10.    Subjective  Patient seen this date for dysphagia management. Patient alert, agreeable to session, and positioned to upright in bed. Patient with frequent and wet cough upon entry, frequently expectorating secretions. Several items of thin liquids at bedside. Patient recalled most strategies recommended from FEES.       Assessment  Extensive education regarding need for thickened liquids per recent FEES. Patient did report distaste for MT2; however, he is frustrated with frequent cough/expectoration. Of note, patient expectorating orange-colored secretions, consistent with reported TN0 orange juice prior to session. Patient refused PO from lunch tray at bedside but was amenable to exercise training. Swallowing exercises targeting laryngeal vestibule closure, base of tongue retraction, pharyngeal constriction, and pharyngeal shortening, as indicated by recent FEES on 5/11/23. Effortful swallow x11 with good accuracy. Patient declined additional trials or different exercises. Discussed with RN re: adherence to thickened liquid and special thickening instructions for Boost Glucose control - 1 mildly thick (pink) packet per 8 oz. RN expressed understanding.         Clinical Impressions  Patient presented with a moderate oropharyngeal dysphagia and esophageal dysphagia per recent FEES on 5/11/23. Patient with poor adherence to diet recommendations, though appears agreeable to them given  "distaste for frequent coughing with expectoration of secretions. Patient had fair participation in swallowing exercises.       Recommendations  Diet Consistency: Minced and moist with mildly thick liquids - STRICT REFLUX PRECAUTIONS  Instrumentation: Instrumental swallow study pending clinical progress  Medication: Crush with applesauce, as appropriate  Supervision: Assist with meal tray set up, Distant supervision - check on patient 2-3 times per meal  Positioning: Fully upright and midline during oral intake, Remain upright for 45 minutes after oral intake  Risk Management : Small bites/sips, Alternate bites and sips, Slow rate of intake (Swallow x2 per bite/sip)  Oral Care: Q6h                     SLP Treatment Plan  Treatment Plan: Dysphagia Treatment  SLP Frequency: 3x Per Week  Estimated Duration: Until Therapy Goals Met      Anticipated Discharge Needs  Discharge Recommendations: Recommend post-acute placement for additional speech therapy services prior to discharge home  Therapy Recommendations Upon DC: Dysphagia Training, Patient / Family / Caregiver Education      Patient / Family Goals  Patient / Family Goal #1: \"Can I have more water?\"  Goal #1 Outcome: Progressing slower than expected  Short Term Goals  Short Term Goal # 1: Pt will complete FEES w SLP to further evaluate swallow function and determine POC.  Goal Outcome # 1: Goal met, new goal added  Short Term Goal # 1 B : Pt will consume diet of mm/mt given 1:1 spv from staff and strict reflux precautions.  Goal Outcome  # 1 B: Progressing slower than expected  Short Term Goal # 2: Pt will complete exercises targeting BOT retraction, pharyngeal shortening, pharyngeal constriction, LVC closure, and epiglottic inversion x20 in a session given mod cues from SLP w \"good\" accuracy.  Goal Outcome # 2 : Progressing slower than expected  Short Term Goal # 3: Pt and family will verbalize/demonstrate 5 precautions/concerns related to pt's dysphagia given min " cues from SLP.  Goal Outcome  # 3: Progressing as expected      Indira Mcfadden, SLP

## 2023-05-15 NOTE — THERAPY
Physical Therapy Contact Note    Patient Name: Cory Marmolejo  Age:  37 y.o., Sex:  male  Medical Record #: [id number]  Today's Date: 5/15/2023    Discussed missed therapy with RN    Pt politely refused to participate in PT session stating need for rest and poor sleep quality.

## 2023-05-16 ENCOUNTER — PHARMACY VISIT (OUTPATIENT)
Dept: PHARMACY | Facility: MEDICAL CENTER | Age: 38
End: 2023-05-16
Payer: COMMERCIAL

## 2023-05-16 ENCOUNTER — APPOINTMENT (OUTPATIENT)
Dept: RADIOLOGY | Facility: MEDICAL CENTER | Age: 38
DRG: 208 | End: 2023-05-16
Attending: INTERNAL MEDICINE
Payer: MEDICAID

## 2023-05-16 VITALS
HEIGHT: 70 IN | OXYGEN SATURATION: 95 % | WEIGHT: 131.84 LBS | HEART RATE: 110 BPM | SYSTOLIC BLOOD PRESSURE: 118 MMHG | TEMPERATURE: 99 F | RESPIRATION RATE: 18 BRPM | BODY MASS INDEX: 18.87 KG/M2 | DIASTOLIC BLOOD PRESSURE: 68 MMHG

## 2023-05-16 LAB
ALBUMIN SERPL BCP-MCNC: 2.5 G/DL (ref 3.2–4.9)
ALBUMIN/GLOB SERPL: 0.8 G/DL
ALP SERPL-CCNC: 112 U/L (ref 30–99)
ALT SERPL-CCNC: 48 U/L (ref 2–50)
ANION GAP SERPL CALC-SCNC: 18 MMOL/L (ref 7–16)
ANISOCYTOSIS BLD QL SMEAR: ABNORMAL
AST SERPL-CCNC: 22 U/L (ref 12–45)
BASOPHILS # BLD AUTO: 0 % (ref 0–1.8)
BASOPHILS # BLD: 0 K/UL (ref 0–0.12)
BILIRUB SERPL-MCNC: 0.4 MG/DL (ref 0.1–1.5)
BUN SERPL-MCNC: 22 MG/DL (ref 8–22)
CALCIUM ALBUM COR SERPL-MCNC: 9.4 MG/DL (ref 8.5–10.5)
CALCIUM SERPL-MCNC: 8.2 MG/DL (ref 8.5–10.5)
CHLORIDE SERPL-SCNC: 100 MMOL/L (ref 96–112)
CO2 SERPL-SCNC: 19 MMOL/L (ref 20–33)
CREAT SERPL-MCNC: 1.58 MG/DL (ref 0.5–1.4)
EKG IMPRESSION: NORMAL
EOSINOPHIL # BLD AUTO: 0.16 K/UL (ref 0–0.51)
EOSINOPHIL NFR BLD: 1.6 % (ref 0–6.9)
ERYTHROCYTE [DISTWIDTH] IN BLOOD BY AUTOMATED COUNT: 48.8 FL (ref 35.9–50)
GFR SERPLBLD CREATININE-BSD FMLA CKD-EPI: 58 ML/MIN/1.73 M 2
GLOBULIN SER CALC-MCNC: 3.2 G/DL (ref 1.9–3.5)
GLUCOSE BLD STRIP.AUTO-MCNC: 148 MG/DL (ref 65–99)
GLUCOSE BLD STRIP.AUTO-MCNC: 153 MG/DL (ref 65–99)
GLUCOSE BLD STRIP.AUTO-MCNC: 155 MG/DL (ref 65–99)
GLUCOSE BLD STRIP.AUTO-MCNC: 202 MG/DL (ref 65–99)
GLUCOSE SERPL-MCNC: 129 MG/DL (ref 65–99)
HCT VFR BLD AUTO: 25.7 % (ref 42–52)
HGB BLD-MCNC: 8.8 G/DL (ref 14–18)
LYMPHOCYTES # BLD AUTO: 0.59 K/UL (ref 1–4.8)
LYMPHOCYTES NFR BLD: 5.8 % (ref 22–41)
MACROCYTES BLD QL SMEAR: ABNORMAL
MAGNESIUM SERPL-MCNC: 1.6 MG/DL (ref 1.5–2.5)
MANUAL DIFF BLD: NORMAL
MCH RBC QN AUTO: 31.5 PG (ref 27–33)
MCHC RBC AUTO-ENTMCNC: 34.2 G/DL (ref 33.7–35.3)
MCV RBC AUTO: 92.1 FL (ref 81.4–97.8)
MICROCYTES BLD QL SMEAR: ABNORMAL
MONOCYTES # BLD AUTO: 0 K/UL (ref 0–0.85)
MONOCYTES NFR BLD AUTO: 0 % (ref 0–13.4)
MORPHOLOGY BLD-IMP: NORMAL
NEUTROPHILS # BLD AUTO: 9.35 K/UL (ref 1.82–7.42)
NEUTROPHILS NFR BLD: 92.6 % (ref 44–72)
NRBC # BLD AUTO: 0 K/UL
NRBC BLD-RTO: 0 /100 WBC
PLATELET # BLD AUTO: 139 K/UL (ref 164–446)
PLATELET BLD QL SMEAR: NORMAL
PMV BLD AUTO: 11.6 FL (ref 9–12.9)
POTASSIUM SERPL-SCNC: 3.6 MMOL/L (ref 3.6–5.5)
PROCALCITONIN SERPL-MCNC: 1.48 NG/ML
PROT SERPL-MCNC: 5.7 G/DL (ref 6–8.2)
RBC # BLD AUTO: 2.79 M/UL (ref 4.7–6.1)
RBC BLD AUTO: PRESENT
SODIUM SERPL-SCNC: 137 MMOL/L (ref 135–145)
WBC # BLD AUTO: 10.1 K/UL (ref 4.8–10.8)

## 2023-05-16 PROCEDURE — 84145 PROCALCITONIN (PCT): CPT

## 2023-05-16 PROCEDURE — 82962 GLUCOSE BLOOD TEST: CPT | Mod: 91

## 2023-05-16 PROCEDURE — 83735 ASSAY OF MAGNESIUM: CPT

## 2023-05-16 PROCEDURE — 97535 SELF CARE MNGMENT TRAINING: CPT

## 2023-05-16 PROCEDURE — 93005 ELECTROCARDIOGRAM TRACING: CPT | Performed by: INTERNAL MEDICINE

## 2023-05-16 PROCEDURE — 99239 HOSP IP/OBS DSCHRG MGMT >30: CPT | Performed by: INTERNAL MEDICINE

## 2023-05-16 PROCEDURE — RXMED WILLOW AMBULATORY MEDICATION CHARGE: Performed by: INTERNAL MEDICINE

## 2023-05-16 PROCEDURE — 700101 HCHG RX REV CODE 250: Performed by: HOSPITALIST

## 2023-05-16 PROCEDURE — A9270 NON-COVERED ITEM OR SERVICE: HCPCS | Performed by: STUDENT IN AN ORGANIZED HEALTH CARE EDUCATION/TRAINING PROGRAM

## 2023-05-16 PROCEDURE — 71045 X-RAY EXAM CHEST 1 VIEW: CPT

## 2023-05-16 PROCEDURE — 85025 COMPLETE CBC W/AUTO DIFF WBC: CPT

## 2023-05-16 PROCEDURE — 85007 BL SMEAR W/DIFF WBC COUNT: CPT

## 2023-05-16 PROCEDURE — 700102 HCHG RX REV CODE 250 W/ 637 OVERRIDE(OP): Performed by: STUDENT IN AN ORGANIZED HEALTH CARE EDUCATION/TRAINING PROGRAM

## 2023-05-16 PROCEDURE — 36415 COLL VENOUS BLD VENIPUNCTURE: CPT

## 2023-05-16 PROCEDURE — 700111 HCHG RX REV CODE 636 W/ 250 OVERRIDE (IP): Performed by: STUDENT IN AN ORGANIZED HEALTH CARE EDUCATION/TRAINING PROGRAM

## 2023-05-16 PROCEDURE — 97165 OT EVAL LOW COMPLEX 30 MIN: CPT

## 2023-05-16 PROCEDURE — 80053 COMPREHEN METABOLIC PANEL: CPT

## 2023-05-16 PROCEDURE — 97162 PT EVAL MOD COMPLEX 30 MIN: CPT

## 2023-05-16 PROCEDURE — 93010 ELECTROCARDIOGRAM REPORT: CPT | Performed by: INTERNAL MEDICINE

## 2023-05-16 RX ORDER — INSULIN LISPRO 100 [IU]/ML
2-9 INJECTION, SOLUTION INTRAVENOUS; SUBCUTANEOUS
Qty: 9 ML | Refills: 0 | Status: SHIPPED | OUTPATIENT
Start: 2023-05-16 | End: 2023-05-16 | Stop reason: SDUPTHER

## 2023-05-16 RX ORDER — OXYCODONE HYDROCHLORIDE 5 MG/1
5 TABLET ORAL EVERY 8 HOURS PRN
Qty: 9 TABLET | Refills: 0 | Status: SHIPPED | OUTPATIENT
Start: 2023-05-16 | End: 2023-05-19

## 2023-05-16 RX ORDER — AMOXICILLIN AND CLAVULANATE POTASSIUM 875; 125 MG/1; MG/1
1 TABLET, FILM COATED ORAL EVERY 12 HOURS
Qty: 6 TABLET | Refills: 0 | Status: ACTIVE | OUTPATIENT
Start: 2023-05-16 | End: 2023-05-19

## 2023-05-16 RX ORDER — INSULIN LISPRO 100 [IU]/ML
2-9 INJECTION, SOLUTION INTRAVENOUS; SUBCUTANEOUS
Qty: 9 ML | Refills: 0 | Status: ACTIVE | OUTPATIENT
Start: 2023-05-16 | End: 2023-06-19

## 2023-05-16 RX ORDER — LIDOCAINE 50 MG/G
2 PATCH TOPICAL EVERY 24 HOURS
Qty: 10 PATCH | Refills: 0 | Status: SHIPPED | OUTPATIENT
Start: 2023-05-16

## 2023-05-16 RX ORDER — NICOTINE 21 MG/24HR
1 PATCH, TRANSDERMAL 24 HOURS TRANSDERMAL EVERY 24 HOURS
Qty: 28 PATCH | Refills: 0 | Status: SHIPPED | OUTPATIENT
Start: 2023-05-16

## 2023-05-16 RX ORDER — OMEPRAZOLE 40 MG/1
40 CAPSULE, DELAYED RELEASE ORAL
Qty: 15 CAPSULE | Refills: 0 | Status: SHIPPED | OUTPATIENT
Start: 2023-05-16

## 2023-05-16 RX ORDER — INSULIN GLARGINE 100 [IU]/ML
7 INJECTION, SOLUTION SUBCUTANEOUS EVERY EVENING
Qty: 9 ML | Refills: 0 | Status: ACTIVE | OUTPATIENT
Start: 2023-05-16 | End: 2023-08-08

## 2023-05-16 RX ADMIN — AMOXICILLIN AND CLAVULANATE POTASSIUM 1 TABLET: 875; 125 TABLET, FILM COATED ORAL at 17:52

## 2023-05-16 RX ADMIN — OXYCODONE 5 MG: 5 TABLET ORAL at 11:23

## 2023-05-16 RX ADMIN — LIDOCAINE 2 PATCH: 50 PATCH TOPICAL at 11:24

## 2023-05-16 RX ADMIN — OMEPRAZOLE 20 MG: 20 CAPSULE, DELAYED RELEASE ORAL at 04:55

## 2023-05-16 RX ADMIN — SENNOSIDES AND DOCUSATE SODIUM 2 TABLET: 50; 8.6 TABLET ORAL at 04:55

## 2023-05-16 RX ADMIN — OXYCODONE 5 MG: 5 TABLET ORAL at 15:32

## 2023-05-16 RX ADMIN — LEVETIRACETAM 500 MG: 500 TABLET, FILM COATED ORAL at 17:52

## 2023-05-16 RX ADMIN — OXYCODONE 5 MG: 5 TABLET ORAL at 02:33

## 2023-05-16 RX ADMIN — NICOTINE TRANSDERMAL SYSTEM 21 MG: 21 PATCH, EXTENDED RELEASE TRANSDERMAL at 04:56

## 2023-05-16 RX ADMIN — AMOXICILLIN AND CLAVULANATE POTASSIUM 1 TABLET: 875; 125 TABLET, FILM COATED ORAL at 04:57

## 2023-05-16 RX ADMIN — INSULIN LISPRO 3 UNITS: 100 INJECTION, SOLUTION INTRAVENOUS; SUBCUTANEOUS at 17:57

## 2023-05-16 RX ADMIN — MUPIROCIN 2 %: 20 OINTMENT TOPICAL at 04:56

## 2023-05-16 RX ADMIN — OXYCODONE 5 MG: 5 TABLET ORAL at 06:44

## 2023-05-16 RX ADMIN — MUPIROCIN 2 APPLICATION: 20 OINTMENT TOPICAL at 17:53

## 2023-05-16 RX ADMIN — Medication 400 MG: at 04:57

## 2023-05-16 RX ADMIN — LEVETIRACETAM 500 MG: 500 TABLET, FILM COATED ORAL at 04:55

## 2023-05-16 RX ADMIN — INSULIN LISPRO 2 UNITS: 100 INJECTION, SOLUTION INTRAVENOUS; SUBCUTANEOUS at 12:45

## 2023-05-16 RX ADMIN — OMEPRAZOLE 20 MG: 20 CAPSULE, DELAYED RELEASE ORAL at 17:52

## 2023-05-16 RX ADMIN — SENNOSIDES AND DOCUSATE SODIUM 2 TABLET: 50; 8.6 TABLET ORAL at 17:52

## 2023-05-16 RX ADMIN — HEPARIN SODIUM 5000 UNITS: 5000 INJECTION, SOLUTION INTRAVENOUS; SUBCUTANEOUS at 14:49

## 2023-05-16 RX ADMIN — HEPARIN SODIUM 5000 UNITS: 5000 INJECTION, SOLUTION INTRAVENOUS; SUBCUTANEOUS at 04:55

## 2023-05-16 ASSESSMENT — COGNITIVE AND FUNCTIONAL STATUS - GENERAL
MOBILITY SCORE: 22
CLIMB 3 TO 5 STEPS WITH RAILING: A LITTLE
SUGGESTED CMS G CODE MODIFIER DAILY ACTIVITY: CH
SUGGESTED CMS G CODE MODIFIER MOBILITY: CJ
WALKING IN HOSPITAL ROOM: A LITTLE
DAILY ACTIVITIY SCORE: 24

## 2023-05-16 ASSESSMENT — PAIN DESCRIPTION - PAIN TYPE
TYPE: ACUTE PAIN

## 2023-05-16 ASSESSMENT — GAIT ASSESSMENTS
ASSISTIVE DEVICE: FRONT WHEEL WALKER
DEVIATION: OTHER (COMMENT)
DISTANCE (FEET): 120
GAIT LEVEL OF ASSIST: SUPERVISED

## 2023-05-16 ASSESSMENT — ACTIVITIES OF DAILY LIVING (ADL): TOILETING: INDEPENDENT

## 2023-05-16 NOTE — FACE TO FACE
Face to Face Note  -  Durable Medical Equipment    Palmer Camilo D.O. - NPI: [id number]  I certify that this patient is under my care and that they had a durable medical equipment(DME)face to face encounter by myself that meets the physician DME face-to-face encounter requirements with this patient on:    Date of encounter:   Patient:                    MRN:                       YOB: 20[birth year]  Cory Marmolejo  [id number]  1985     The encounter with the patient was in whole, or in part, for the following medical condition, which is the primary reason for durable medical equipment:  Other - abnormal gait    I certify that, based on my findings, the following durable medical equipment is medically necessary:    Walkers.    My Clinical findings support the need for the above equipment due to:  Abnormal Gait

## 2023-05-16 NOTE — CARE PLAN
Problem: Knowledge Deficit - Standard  Goal: Patient and family/care givers will demonstrate understanding of plan of care, disease process/condition, diagnostic tests and medications  Outcome: Progressing     Problem: Hemodynamics  Goal: Patient's hemodynamics, fluid balance and neurologic status will be stable or improve  Outcome: Progressing     Problem: Respiratory  Goal: Patient will achieve/maintain optimum respiratory ventilation and gas exchange  Outcome: Progressing     Problem: Pain - Standard  Goal: Alleviation of pain or a reduction in pain to the patient’s comfort goal  Outcome: Progressing   The patient is Stable - Low risk of patient condition declining or worsening    Shift Goals  Clinical Goals: pain control, monitor O2  Patient Goals: pain control  Family Goals: CELINA    Progress made toward(s) clinical / shift goals:  pain management per MAR, fall precautions in place, manage oral secretions    Patient is not progressing towards the following goals:

## 2023-05-16 NOTE — DISCHARGE PLANNING
0830  DPA resent referral to [facility], Per CM RN, in anticipation of open Medicaid beds.     0916  Received Choice form at 0902  Agency/Facility Name: [facility]   Referral sent per Choice form @ 0916
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
## 2023-05-16 NOTE — FACE TO FACE
"Face to Face Note  -  Durable Medical Equipment    Palmer Camilo D.O. - NPI: [id number]  I certify that this patient is under my care and that they had a durable medical equipment(DME)face to face encounter by myself that meets the physician DME face-to-face encounter requirements with this patient on:    Date of encounter:   Patient:                    MRN:                       YOB: 2022  Cory Marmolejo  [id number]  1985     The encounter with the patient was in whole, or in part, for the following medical condition, which is the primary reason for durable medical equipment:  Other - cardiac arrest and aspiration    I certify that, based on my findings, the following durable medical equipment is medically necessary:    Oxygen   HOME O2 Saturation Measurements:(Values must be present for Home Oxygen orders)  Room air sat at rest: 92  Room air sat with amb: 85  With liters of O2: 2, O2 sat at rest with O2: 98  With Liters of O2: 2, O2 sat with amb with O2 : 90  Is the patient mobile?: Yes  If patient feels more short of breath, they can go up to 6 liters per minute and contact healthcare provider.    Supporting Symptoms: The patient requires supplemental oxygen, as the following interventions have been tried with limited or no improvement: \"Ambulation with oximetry and \"Incentive spirometry.    My Clinical findings support the need for the above equipment due to:  Hypoxia  "
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
## 2023-05-16 NOTE — DISCHARGE PLANNING
Case Management Discharge Planning    Admission Date: 5/7/2023  GMLOS: 8.1  ALOS: 8    6-Clicks ADL Score: 17  6-Clicks Mobility Score: 13  Has referral(s) been sent to post-acute provider:  Yes      Anticipated Discharge Dispo: Discharge Disposition: Still a Patient (30)    DME Needed: No    Action(s) Taken: OTHER [facility] referral sent.    Escalations Completed: None    Medically Clear: Yes    Next Steps: CM will continue to follow    Barriers to Discharge: Pending Placement    CM will arrange transportation when patient is accepted to SNF.    Roseanna FREDERICK RN Case Manager  [phone]

## 2023-05-16 NOTE — CARE PLAN
The patient is Stable - Low risk of patient condition declining or worsening    Shift Goals  Clinical Goals: VSS, wean o2, manage pain, increase activity, promote self care, possible dc today  Patient Goals: pain meds, tea, ice, shower, but not right now, pain meds, gatorade, vending machine, pain meds...  Family Goals: updates (convo with grandpa)    Progress made toward(s) clinical / shift goals:    Problem: Venous Thromboembolism (VTE) Prevention  Goal: The patient will remain free from venous thromboembolism (VTE)  Outcome: Progressing  Note: Pt refusing SCDs.  Educated on importance.     Problem: Urinary Elimination  Goal: Establish and maintain regular urinary output  Outcome: Progressing  Note: Pt voiding adequately in urinal.     Problem: Pain - Standard  Goal: Alleviation of pain or a reduction in pain to the patient’s comfort goal  Outcome: Progressing  Note: Pt repeatedly asking for pain meds for rib/chest pain r/t CPR.  Ice packs placed.  Lidoderm applied.  Prns given.  Pain appears to be under control.     Problem: Skin Integrity  Goal: Skin integrity is maintained or improved  Outcome: Progressing  Note: Interventions in place.     Problem: Fall Risk  Goal: Patient will remain free from falls  Outcome: Progressing  Note: Interventions in place.

## 2023-05-16 NOTE — THERAPY
Occupational Therapy   Initial Evaluation     Patient Name: Cory Marmolejo  Age:  37 y.o., Sex:  male  Medical Record #: [id number]  Today's Date: 5/16/2023     Precautions  Precautions: Fall Risk, Swallow Precautions    Assessment  Patient is 37 y.o. male with a diagnosis of cardiac arrest.   Pt is at or near his/her functional baseline. Pt with no further skilled OT needs in the acute care setting at this time.      Plan    Occupational Therapy Initial Treatment Plan   Duration: (P) Discharge Needs Only       Discharge Recommendations: (P) Anticipate that the patient will have no further occupational therapy needs after discharge from the hospital        05/16/23 1211   Prior Living Situation   Housing / Facility Mobile Home   Steps Into Home 0   Steps In Home 0   Bathroom Set up Shower Chair  (belongs to step mother)   Equipment Owned None   Prior Level of ADL Function   Self Feeding Independent   Grooming / Hygiene Independent   Bathing Independent   Dressing Independent   Toileting Independent   ADL Assessment   Grooming Independent   Upper Body Dressing Supervision   Lower Body Dressing Supervision   Toileting Supervision   Functional Mobility   Sit to Stand Supervised   Bed, Chair, Wheelchair Transfer Standby Assist   Occupational Therapy Initial Treatment Plan    Duration Discharge Needs Only   Anticipated Discharge Equipment and Recommendations   Discharge Recommendations Anticipate that the patient will have no further occupational therapy needs after discharge from the hospital

## 2023-05-16 NOTE — DISCHARGE PLANNING
Meds-to-Beds: Discharge prescription orders listed below delivered to patient's bedside RN Angella who placed insulin in refrigerator until discharge and locked meds in patient drawer. Patient counseled. Nicotine gum and lidocaine patches not covered, patient informed. Patient informed RN that he has testing supplies available.     Patient educated to store pens not in use in the refrigerator. Current pen in use can be stored at room temperature for up to 28 days.       Current Outpatient Medications   Medication Sig Dispense Refill    amoxicillin-clavulanate (AUGMENTIN) 875-125 MG Tab Take 1 Tablet by mouth every 12 hours for 3 days. 6 Tablet 0    omeprazole (PRILOSEC) 40 MG delayed-release capsule Take 1 Capsule by mouth a day. 15 Capsule 0    nicotine (NICODERM) 21 MG/24HR PATCH 24 HR Place 1 Patch on the skin every 24 hours. 28 Patch 0    insulin glargine (LANTUS SOLOSTAR) 100 UNIT/ML Solution Pen-injector injection Inject 7 Units under the skin every evening for 30 days. 9 mL 0    insulin lispro 100 UNIT/ML SC SOPN injection PEN Inject 2-9 Units under the skin 3 times a day before meals per sliding scale:  151 - 200mg/dL = 2 Units   201 - 250mg/dL = 3 Units    251 - 300mg/dL =  5 Units   301 - 350 mg/dL=  6 Units    351 - 400mg/dL=  8 Units    Over 400mg/dL=  9 Units 9 mL 0    Insulin Pen Needle 32 G x 4 mm Use one pen needle to inject insulin four times daily. 100 Each 0    oxyCODONE immediate-release (ROXICODONE) 5 MG Tab Take 1 Tablet by mouth every 8 hours as needed for Severe Pain for up to 3 days. 9 Tablet 0      Briana Wagoner, PharmD
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
## 2023-05-16 NOTE — PROGRESS NOTES
Report received, pt care assumed, patient is medical. VSS, pt assessment complete. Pt aaox4, no signs of distress noted at this time. POC discussed with pt and verbalizes no questions.  Pt denies any additional needs at this time. Bed in lowest position, patient refusing bed alarm, pt educated on fall risk and verbalized understanding, call light within reach, will continue to monitor.

## 2023-05-17 NOTE — PROGRESS NOTES
Reassessed pt's o2 sats/requirements per Dr Camilo request.  See 2nd home o2 assessment completed by this RN.

## 2023-05-17 NOTE — PROGRESS NOTES
9 tablets of oxycodone 5mg provided by [facility], in pt's med drawer for pt discharge at time of shift change.

## 2023-05-17 NOTE — CONSULTS
Diabetes education: Pt has a brief hx of diabetes on insulin. Pt was admitted post arrest with blood sugar of 553, and Hga1c of 14.0%. Pt was on Glargine 9 units pm with Humalog per sliding scale coverage tid meals. Blood sugars are 148, 153(2 units), and 202 (3 units).  Met with pt this afternoon. Pt stated he had not had any diabetes education at diagnosis.  Discussed what diabetes was, difference between type one and type two, hypoglycemia,   hyperglycemia, ketones  and goals for blood sugars. Discussed briefly the difference between a carbohydrate ratio, and set meal dose ( per pt 's sheet he was to take 10 units with every meal), insulin correction ( pt was using a sliding scale),and how to use. CDE discussed not taking the 10 units with every meal now as he is not eating enough. Pt should follow discharge orders until seen by PCP. Discussed need for carbohydrates and proteins, with every meal and why. Discussed the importance of the HS snack with a carbohydrate and protein. Discussed need, benefit and precautions with exercise.  Discussed  what effects blood sugars. Discussed need to follow up with his PCP.  Insulin was discussed as well, insulin storage, shelf life and site rotation.  Pt had old insulin pens with pen needles on them. Discussed new needle each time and to discharge those pen as they have been at room temp for more than 28 days ( 2 of each in [facility] Pt stated he will discard them.  Reviewed in depth hypoglycemia and treatment. Questions answered.

## 2023-05-17 NOTE — DISCHARGE PLANNING
Call from [facility] for assistance with oxygen set up. Ambulation study complete, order and F2F done.    CM reviewed chart, pt has Medicaid FFS and lives in [place], [place] which is [place] [facility]. MARY spoke with Bailey ACKERMAN who states it will likely need to be coordinated with Kathleen and unable to do that outside of normal office hours.     [facility] notified.

## 2023-05-17 NOTE — PROGRESS NOTES
PIV and tele box removed. Discharge summary done with patient. RN provided education regarding follow up care, appointments, and medications. RN also provided education regarding when to call doctor and when to call 911.  Pt now waiting for ride to arrive.

## 2023-05-17 NOTE — PROGRESS NOTES
Diabetes education: Please send a bag of ice for pt's new insulin pens as trip home is long. Pt should follow new insulin guidelines until seen by his PCP.

## 2023-05-17 NOTE — PROGRESS NOTES
It was noted by primary RN during 1045 rounds that patient would not require oxygen at discharge. RN to put in evaluation numbers for documentation.     1645: Notified Dr. Camilo that it appears home oxygen evaluation shows patient will require oxygen with ambulation. MD to put in an order/ face to face for oxygen. Charge to reach out to ED CM to see if we can arrange oxygen for DC today.